# Patient Record
Sex: FEMALE | Race: WHITE | NOT HISPANIC OR LATINO | Employment: UNEMPLOYED | URBAN - METROPOLITAN AREA
[De-identification: names, ages, dates, MRNs, and addresses within clinical notes are randomized per-mention and may not be internally consistent; named-entity substitution may affect disease eponyms.]

---

## 2017-06-07 ENCOUNTER — CONVERSION ENCOUNTER (OUTPATIENT)
Dept: GENERAL RADIOLOGY | Facility: HOSPITAL | Age: 52
End: 2017-06-07

## 2018-01-23 ENCOUNTER — OFFICE VISIT CONVERTED (OUTPATIENT)
Dept: SURGERY | Facility: CLINIC | Age: 53
End: 2018-01-23
Attending: SURGERY

## 2018-02-06 ENCOUNTER — OFFICE VISIT CONVERTED (OUTPATIENT)
Dept: SURGERY | Facility: CLINIC | Age: 53
End: 2018-02-06
Attending: SURGERY

## 2018-04-16 ENCOUNTER — OFFICE VISIT CONVERTED (OUTPATIENT)
Dept: PULMONOLOGY | Facility: CLINIC | Age: 53
End: 2018-04-16
Attending: PHYSICIAN ASSISTANT

## 2019-05-14 ENCOUNTER — OFFICE VISIT CONVERTED (OUTPATIENT)
Dept: NEUROSURGERY | Facility: CLINIC | Age: 54
End: 2019-05-14
Attending: PHYSICIAN ASSISTANT

## 2019-05-14 ENCOUNTER — CONVERSION ENCOUNTER (OUTPATIENT)
Dept: NEUROLOGY | Facility: CLINIC | Age: 54
End: 2019-05-14

## 2019-05-14 ENCOUNTER — HOSPITAL ENCOUNTER (OUTPATIENT)
Dept: GENERAL RADIOLOGY | Facility: HOSPITAL | Age: 54
Discharge: HOME | End: 2019-05-14
Attending: PHYSICIAN ASSISTANT

## 2019-06-26 ENCOUNTER — OFFICE VISIT CONVERTED (OUTPATIENT)
Dept: NEUROSURGERY | Facility: CLINIC | Age: 54
End: 2019-06-26
Attending: PHYSICIAN ASSISTANT

## 2019-06-26 ENCOUNTER — CONVERSION ENCOUNTER (OUTPATIENT)
Dept: NEUROLOGY | Facility: CLINIC | Age: 54
End: 2019-06-26

## 2019-07-16 ENCOUNTER — OFFICE VISIT CONVERTED (OUTPATIENT)
Dept: UROLOGY | Facility: CLINIC | Age: 54
End: 2019-07-16
Attending: UROLOGY

## 2020-07-06 ENCOUNTER — HOSPITAL ENCOUNTER (OUTPATIENT)
Dept: GENERAL RADIOLOGY | Facility: HOSPITAL | Age: 55
Discharge: HOME | End: 2020-07-06
Attending: NURSE PRACTITIONER

## 2020-07-14 ENCOUNTER — HOSPITAL ENCOUNTER (OUTPATIENT)
Dept: GENERAL RADIOLOGY | Facility: HOSPITAL | Age: 55
Discharge: HOME | End: 2020-07-14
Attending: NURSE PRACTITIONER

## 2020-07-14 LAB
CREAT BLD-MCNC: 0.7 MG/DL (ref 0.6–1.4)
GFR SERPLBLD BASED ON 1.73 SQ M-ARVRAT: >60 ML/MIN/{1.73_M2}

## 2020-07-22 ENCOUNTER — OFFICE VISIT CONVERTED (OUTPATIENT)
Dept: SURGERY | Facility: CLINIC | Age: 55
End: 2020-07-22
Attending: SURGERY

## 2020-07-27 ENCOUNTER — HOSPITAL ENCOUNTER (OUTPATIENT)
Dept: LAB | Facility: HOSPITAL | Age: 55
Discharge: HOME | End: 2020-07-27
Attending: SURGERY

## 2020-07-28 LAB
CANCER AG19-9 SERPL-ACNC: 10.6 U/ML (ref 0–35)
CEA SERPL-MCNC: 3.5 NG/ML (ref 0–5)

## 2020-07-29 ENCOUNTER — HOSPITAL ENCOUNTER (OUTPATIENT)
Dept: PREADMISSION TESTING | Facility: HOSPITAL | Age: 55
Discharge: HOME | End: 2020-07-29
Attending: SURGERY

## 2020-07-30 LAB — SARS-COV-2 RNA SPEC QL NAA+PROBE: NOT DETECTED

## 2020-08-03 ENCOUNTER — HOSPITAL ENCOUNTER (OUTPATIENT)
Dept: PERIOP | Facility: HOSPITAL | Age: 55
Setting detail: HOSPITAL OUTPATIENT SURGERY
Discharge: HOME | End: 2020-08-03
Attending: SURGERY

## 2020-08-04 ENCOUNTER — HOSPITAL ENCOUNTER (OUTPATIENT)
Dept: PREADMISSION TESTING | Facility: HOSPITAL | Age: 55
Discharge: HOME | End: 2020-08-04
Attending: SURGERY

## 2020-08-04 LAB — SARS-COV-2 RNA SPEC QL NAA+PROBE: NOT DETECTED

## 2020-08-06 ENCOUNTER — HOSPITAL ENCOUNTER (OUTPATIENT)
Dept: PERIOP | Facility: HOSPITAL | Age: 55
Setting detail: HOSPITAL OUTPATIENT SURGERY
Discharge: HOME | End: 2020-08-06
Attending: SURGERY

## 2020-08-07 ENCOUNTER — HOSPITAL ENCOUNTER (OUTPATIENT)
Dept: SURGERY | Facility: CLINIC | Age: 55
Discharge: HOME | End: 2020-08-07
Attending: NURSE PRACTITIONER

## 2020-08-07 ENCOUNTER — OFFICE VISIT CONVERTED (OUTPATIENT)
Dept: UROLOGY | Facility: CLINIC | Age: 55
End: 2020-08-07
Attending: NURSE PRACTITIONER

## 2020-08-09 LAB — BACTERIA UR CULT: NORMAL

## 2020-08-20 ENCOUNTER — OFFICE VISIT CONVERTED (OUTPATIENT)
Dept: SURGERY | Facility: CLINIC | Age: 55
End: 2020-08-20
Attending: SURGERY

## 2020-08-24 ENCOUNTER — HOSPITAL ENCOUNTER (OUTPATIENT)
Dept: PREADMISSION TESTING | Facility: HOSPITAL | Age: 55
Discharge: HOME | End: 2020-08-24
Attending: SURGERY

## 2020-08-25 LAB — SARS-COV-2 RNA SPEC QL NAA+PROBE: NOT DETECTED

## 2020-09-17 ENCOUNTER — HOSPITAL ENCOUNTER (OUTPATIENT)
Dept: PREADMISSION TESTING | Facility: HOSPITAL | Age: 55
Discharge: HOME | End: 2020-09-17
Attending: SURGERY

## 2020-09-18 LAB — SARS-COV-2 RNA SPEC QL NAA+PROBE: NOT DETECTED

## 2020-09-22 ENCOUNTER — HOSPITAL ENCOUNTER (OUTPATIENT)
Dept: GASTROENTEROLOGY | Facility: HOSPITAL | Age: 55
Setting detail: HOSPITAL OUTPATIENT SURGERY
Discharge: HOME | End: 2020-09-22
Attending: SURGERY

## 2020-09-30 ENCOUNTER — OFFICE VISIT CONVERTED (OUTPATIENT)
Dept: SURGERY | Facility: CLINIC | Age: 55
End: 2020-09-30
Attending: SURGERY

## 2020-11-19 ENCOUNTER — OFFICE VISIT CONVERTED (OUTPATIENT)
Dept: UROLOGY | Facility: CLINIC | Age: 55
End: 2020-11-19
Attending: NURSE PRACTITIONER

## 2020-11-30 ENCOUNTER — HOSPITAL ENCOUNTER (OUTPATIENT)
Dept: GENERAL RADIOLOGY | Facility: HOSPITAL | Age: 55
Discharge: HOME | End: 2020-11-30
Attending: NURSE PRACTITIONER

## 2020-12-03 ENCOUNTER — TELEPHONE CONVERTED (OUTPATIENT)
Dept: UROLOGY | Facility: CLINIC | Age: 55
End: 2020-12-03
Attending: NURSE PRACTITIONER

## 2020-12-07 ENCOUNTER — HOSPITAL ENCOUNTER (OUTPATIENT)
Dept: GENERAL RADIOLOGY | Facility: HOSPITAL | Age: 55
Discharge: HOME | End: 2020-12-07
Attending: NURSE PRACTITIONER

## 2020-12-08 ENCOUNTER — HOSPITAL ENCOUNTER (OUTPATIENT)
Dept: CARDIOLOGY | Facility: HOSPITAL | Age: 55
Discharge: HOME | End: 2020-12-08
Attending: NURSE PRACTITIONER

## 2021-01-07 ENCOUNTER — OFFICE VISIT CONVERTED (OUTPATIENT)
Dept: NEUROLOGY | Facility: CLINIC | Age: 56
End: 2021-01-07
Attending: PSYCHIATRY & NEUROLOGY

## 2021-02-26 ENCOUNTER — HOSPITAL ENCOUNTER (OUTPATIENT)
Dept: GENERAL RADIOLOGY | Facility: HOSPITAL | Age: 56
Discharge: HOME | End: 2021-02-26
Attending: PSYCHIATRY & NEUROLOGY

## 2021-04-15 ENCOUNTER — CONVERSION ENCOUNTER (OUTPATIENT)
Dept: FAMILY MEDICINE CLINIC | Facility: CLINIC | Age: 56
End: 2021-04-15

## 2021-04-15 ENCOUNTER — OFFICE VISIT CONVERTED (OUTPATIENT)
Dept: FAMILY MEDICINE CLINIC | Facility: CLINIC | Age: 56
End: 2021-04-15
Attending: PHYSICIAN ASSISTANT

## 2021-05-10 NOTE — H&P
History and Physical      Patient Name: Lindsey Mckinnon   Patient ID: [id number]   Sex: Female   YOB: 1965    Primary Care Provider: Dipti Tran NP   Referring Provider: Dipti Tran NP    Visit Date: November 19, 2020    Provider: SOFIA Dotson   Location: [facility]   Location Address: [address] [place] [address]  [id number]   Location Phone: [phone]          Chief Complaint  · pt here for urological concerns      History Of Present Illness  The patient is a 55 year old /White female, who is a consultation from Dipti Tran NP, for the evaluation of microhematuria. The patient was found to have microhematuria on an urinalysis approximately 2 years ago.     She states the color of her urine has been grossly clear. The patient also reports back pain and issues emptying bladder. She denies frequency, urgency, dysuria, fever, chills, nausea, vomiting, and weight loss.     She states that there is no history of recent abdominal or flank trauma. The patient's past medical history is noncontributory.     The patient has not been previously evaluated for hematuria.      Patient reports that she has trouble emptying her bladder and this has been ongoing. She had some post op urinary retention on tamsulosin and states complete relief of issues with voiding.  However she was only supposed to be on that for a short period of time and has not had any of that since.    She has a history of a renal stone.               Past Medical History  Disease Name Date Onset Notes   Allergic rhinitis, chronic --  --    Anxiety --  --    Arteriosclerotic cardiovascular disease (ASCVD) --  --    Arthritis --  --    Arthritis --  --    Breast lump --  --    Cervical spine pain --  --    Chest pain --  --    Chronic Obstructive Pulmonary Disease --  --    COPD --  --    COPD (chronic obstructive pulmonary disease) --  --    Degenerative Disc Disease  --  --    Depression --  --     Esophageal reflux --  --    Fibromyalgia --  --    GERD --  --    Heart attack --  --    Heart Attack --  --    Herniated Disc --  --    High blood pressure --  --    High cholesterol --  --    Hyperlipemia --  --    Hyperlipidemia --  --    Hypertension --  --    Hypertension, Benign Essential --  --    Hypertension, essential --  --    Kidney stones --  --    Limb Swelling --  --    Mood disorder --  --    Muscle cramps --  --    Pain, Lumbar --  --    Reflux --  --    Renal stones --  --    Shortness Of Air --  --    Shortness of Breath --  --          Past Surgical History  Procedure Name Date Notes   Cervical epidural steroid injections --  --    Cesarian Section --  --    EGD --  --    Hysterectomy --  --    Hysterectomy-Abdominal --  --    Lumbar epidural steroid injections --  --    Oopherectomy --  --    Tubal ligation --  --          Medication List  Name Date Started Instructions   Breo Ellipta 100-25 mcg/dose inhalation blister with device  inhale 1 puff by inhalation route once daily at the same time each day   Claritin 10 mg oral tablet  take 1 tablet (10 mg) by oral route once daily   lisinopril 20 mg oral tablet  take 2 tablets (40 mg) by oral route once daily   methocarbamol 500 mg oral tablet  take 2 tablets (1,000 mg) by oral route 4 times per day   OxyContin 15 mg oral tablet,oral only,ext.rel.12 hr  take 1 tablet (15 mg) by oral route every 12 hours   sertraline 100 mg oral tablet  take 1 tablet (100 mg) by oral route once daily   Spiriva Respimat 1.25 mcg/actuation inhalation mist  inhale 2 puffs (2.5 mcg) by inhalation route once daily         Allergy List  Allergen Name Date Reaction Notes   Morphine Sulfate --  --  --    SULFA (SULFONAMIDES) --  --  --        Allergies Reconciled  Family Medical History  Disease Name Relative/Age Notes   Lung Neoplasm, Malignant Father/   --    Cancer, Unspecified Father/  Mother/   Mother; Father   Diabetes, unspecified type Brother/  Father/  Mother/    Mother; Father; Brother   Spine Problems Mother/   --    Heart Attack (MI) Father/   --    Renal Calculus Brother/  Father/   Father; Brother  Mother; Brother   Bladder calculus Father/  Mother/   Mother; Father  Mother   Osteoporosis Mother/   Mother   Family history of Arthritis Brother/  Father/  Mother/  Son/   Mother; Father; Brother; Son   Family history of cancer Father/  Mother/   Mother; Father   Family history of heart disease Father/   Father   Family history of diabetes mellitus Brother/  Father/  Mother/   Mother; Father; Brother         Social History  Finding Status Start/Stop Quantity Notes   Alcohol Use --  --/-- --  08/20/2020 - 06/26/2019 - 05/14/2019 - rarely drinks, less than 1 drink per day, has been drinking for 11-20 years   Caffeine Current every day --/-- --  drinks regularly; coffee; 1-2 times per day    --  --/-- --  --    lives with other --  --/-- --  --    Recreational Drug Use Never --/-- --  no   Second hand smoke exposure Current some day --/-- --  yes   Tobacco Current every day --/-- 0.5 PPD current every day smoker, 0.5 pack per day, smoked 21-30 years  current everyday smoker; started smoking at age 20; smoked 2 cigarette(s) per day  current every day smoker, 0.5 pack per day, smoked 21-30 years   Unemployed --  --/-- --  --          Review of Systems  · Constitutional  o Denies  o : fever, chills  · Eyes  o Denies  o : double vision, cataracts  · HENT  o Denies  o : hearing loss, headaches  · Cardiovascular  o Denies  o : chest pain at rest, chest pain with exercise, irregular heart beats, palpitations, leg cramps with exercise  · Respiratory  o Denies  o : shortness of breath, wheezing, sleep apnea  · Gastrointestinal  o Denies  o : heartburn or indigestion, nausea or vomiting, change in abdominal girth, diarrhea, constipation, blood in stools  · Genitourinary  o Admits  o : additional symptoms as noted in HPI  · Integument  o Denies  o : rash, new skin  "lesions  · Neurologic  o Denies  o : memory difficulties, headache, mini-strokes, seizures  · Endocrine  o Denies  o : hot flashes, thyroid disorders  · Psychiatric  o Denies  o : depression, schizophrenia, bipolar disorder  · Heme-Lymph  o Denies  o : easy bleeding, easy bruising, sickle cell disease or trait, lymph node enlargement or tenderness  · Allergic-Immunologic  o Denies  o : immune deficiency, HIV, Hepatitis C      Vitals  Date Time BP Position Site L\R Cuff Size HR RR TEMP (F) WT  HT  BMI kg/m2 BSA m2 O2 Sat FR L/min FiO2 HC       11/19/2020 03:24 PM       15  235lbs 0oz 5'  5\" 39.11 2.21             Physical Examination  · Constitutional  o Appearance  o : Well nourished, well developed patient in no acute distress. Ambulating without difficulty.  · Head and Face  o Head  o :   § Inspection  § : atraumatic, normocephalic  o Face  o :   § Inspection  § : no facial lesions  · Eyes  o Sclerae  o : sclerae white  · Ears, Nose, Mouth and Throat  o Ears  o :   § External Ears  § : appearance within normal limits, no lesions present  o Nose  o :   § External Nose  § : appearance normal  · Neck  o Inspection/Palpation  o : normal appearance, trachea midline  · Respiratory  o Respiratory Effort  o : Breathing is unlabored without accessory muscle use  o Inspection of Chest  o : normal appearance, no retractions  · Skin and Subcutaneous Tissue  o General Inspection  o : No rashes, lesions or areas of discoloration present. Skin turgor is normal.  o General Palpation  o : No abnormalities, masses or tenderness on palpation.  · Neurologic  o Mental Status Examination  o :   § Orientation  § : grossly oriented to person, place and time  § Speech/Language  § : communication ability within normal limits  o Gait and Station  o : normal gait, able to stand without difficulty  · Psychiatric  o Judgement and Insight  o : judgment and insight intact, judgement for everyday activities and social situations within normal " limits, insight intact  o Mood and Affect  o : mood normal, affect appropriate          Results  · In-Office Procedures  o Lab procedure  § Automated dipstick urinalysis with microscopy ([id number])   § Color Ur: Yellow   § Clarity Ur: Clear   § Glucose Ur Ql Strip: Negative   § Bilirub Ur Ql Strip: Negative   § Ketones Ur Ql Strip: Negative   § Sp Gr Ur Qn: 1.025   § Hgb Ur Ql Strip: Small   § pH Ur-LsCnc: 5.0   § Prot Ur Ql Strip: Negative   § Urobilinogen Ur Strip-mCnc: 0.2   § Nitrite Ur Ql Strip: Negative   § WBC Est Ur Ql Strip: Trace   § RBC UrnS Qn HPF: 3-5   § WBC UrnS Qn HPF: 2-3   § Bacteria UrnS Qn HPF: 0   § Crystals UrnS Qn HPF: 0   § Epithelial Cells (non renal): 0 /HPF  § Epithelial Cells (renal): 0       Assessment  · Microscopic hematuria     599.72/R31.29  · Renal cyst     753.10/N28.1  · Urinary retention with incomplete bladder emptying     788.21/R33.9      Plan  · Orders  o CT Abdomen and Pelvis (with and without Contrast) with Bosniak Class and delayed images ([id number]) - 599.72/R31.29 - 11/19/2020  o Cystourethroscopy (18435) - 599.72/R31.29, 753.10/N28.1 - 11/19/2020  · Medications  o tamsulosin 0.4 mg oral capsule   SIG: take 1 capsule (0.4 mg) by oral route once daily 1/2 hour following the same meal each day for 30 days   DISP: (30) Capsule with 11 refills  Prescribed on 11/19/2020     o Medications have been Reconciled  o Transition of Care or Provider Policy  · Instructions  o DISCUSSION:  o The patient has documented hematuria. I have discussed the etiologies of hematuria and the options for management and treatment. I have outlined my recommendation for this problem. The patient is in agreement with the plans. The patient is aware that if the workup is not completed, there is a chance that a malignancy may go undetected and may lead to morbidity or mortality.  o PLAN: Will send in tamsulosin for patient to see if this alleviates her issues with urination. Follow-up with patient 2 days  after CT scan  o Schedule cystoscopy  o CT abdomen/pelvis without and with IV contrast  o Electronically Identified Patient Education Materials Provided Electronically  · Referrals  o ID: [id number] Date: 11/17/2020 Type: Inbound  Specialty: Urology            Electronically Signed by: SOFIA Dotson -Author on November 19, 2020 04:08:28 PM

## 2021-05-10 NOTE — H&P
History and Physical      Patient Name: Lindsey Mckinnon   Patient ID: [id number]   Sex: Female   YOB: 1965    Primary Care Provider: Dipti Tran NP   Referring Provider: JB BLACK    Visit Date: January 7, 2021    Provider: Adam Hernández MD   Location: [facility]   Location Address: [address] [place] [address]  [id number]   Location Phone: [phone]          Chief Complaint     New patient here to establish care for tremors       History Of Present Illness  Lindsey Mckinnon is a 55 year old /White female who presents today to [facility] today referred from JB BLACK.      55-year-old woman evaluated for tremors of both arms.  She states that she is not worried about the tremors.  Is been going on for the last 3 to 4 years.  It is worse in the left side.  She states that she is independent with all activities of daily living and the tremor does not bother her.  When she tells me is that her legs are the ones that bother her.  She states that she has had pain in her legs for the last several months and she has spasms as well.  She is being seen by pain management.  She states that she has fallen down twice in the last year.  She states that one time she was washing dishes and her legs gave way on her.  Another time she was walking and she caught herself falling forward because her legs gave out on her but she did not fall all the way.  She is never had any focal neurologic symptoms in the past.  She is blaming her symptoms on her spine.  She has had an MRI of the lumbar spine that was performed 6/4/2019 showing mild left and moderate to severe right foraminal narrowing at L5-S1.  The other levels are unremarkable for any significant neuroforaminal narrowing.  She has had an MRI of the cervical spine as well showing no significant neuroforaminal narrowing or spinal stenosis.  She also had a lumbar spine MRI last month  showing the same findings at L5-S1 showing a degree of neuroforaminal is not significantly changed compared to the one performed in June 2019.  I do not see any significant spinal stenosis on reviewing the images.    She has no symptoms of restless leg syndrome.  She states that the spasms in her legs occur during the daytime and not necessarily at night and she does not have the urge to move her legs at night.       Past Medical History  Allergic rhinitis, chronic; Anxiety; Arteriosclerotic cardiovascular disease (ASCVD); Arthritis; Arthritis; Breast lump; Cervical spine pain; Chest pain; Chronic Obstructive Pulmonary Disease; COPD; COPD (chronic obstructive pulmonary disease); Degenerative Disc Disease ; Depression; Esophageal reflux; Fibromyalgia; GERD; Heart attack; Heart Attack; Herniated Disc; High blood pressure; High cholesterol; Hyperlipemia; Hyperlipidemia; Hypertension; Hypertension, Benign Essential; Hypertension, essential; Kidney stones; Limb Swelling; Mood disorder; Muscle cramps; Pain, Lumbar; Reflux; Renal stones; Shortness Of Air; Shortness of Breath         Past Surgical History  Cervical epidural steroid injections; Cesarian Section; EGD; Hysterectomy; Hysterectomy-Abdominal; Lumbar epidural steroid injections; Oopherectomy; Tubal ligation         Medication List  Breo Ellipta 100-25 mcg/dose inhalation blister with device; Claritin 10 mg oral tablet; hydrochlorothiazide 25 mg oral tablet; lisinopril 20 mg oral tablet; loratadine 10 mg oral tablet; metaxalone 800 mg oral tablet; OxyContin 15 mg oral tablet,oral only,ext.rel.12 hr; sertraline 100 mg oral tablet; Spiriva Respimat 1.25 mcg/actuation inhalation mist; tamsulosin 0.4 mg oral capsule         Allergy List  Morphine Sulfate; SULFA (SULFONAMIDES)       Allergies Reconciled  Family Medical History  Lung Neoplasm, Malignant; Cancer, Unspecified; Diabetes, unspecified type; Spine Problems; Heart Attack (MI); Renal Calculus; Bladder calculus;  "Osteoporosis; Family history of Arthritis; Family history of cancer; Family history of heart disease; Family history of diabetes mellitus         Social History  Alcohol Use; Caffeine (Current every day); ; lives with other; Recreational Drug Use (Never); Second hand smoke exposure (Current some day); Tobacco (Current every day); Unemployed         Review of Systems  · Constitutional  o Admits  o : excessive sweating, fatigue  o Denies  o : chills, fever, sycope/passing out, weight gain, weight loss  · Eyes  o Denies  o : changes in vision, blurred vision, double vision  · HENT  o Admits  o : nasal congestion, seasonal allergies  o Denies  o : hearing loss, ringing in the ears, ear aches, sore throat, sinus pain, nose bleeds  · Cardiovascular  o Admits  o : easy burising or bleeding  o Denies  o : blood clots, swollen legs, anemia, transfusions  · Respiratory  o Admits  o : COPD  o Denies  o : shortness of breath, dry cough, productive cough, pneumonia  · Gastrointestinal  o Denies  o : dysphagia, reflux  · Genitourinary  o Denies  o : incontinence  · Neurologic  o Admits  o : tremor, loss of balance, falls, dizziness/vertigo, difficulty with sleep, numbness/tingling/paresthesia , difficulty with coordination, weakness  o Denies  o : headache, seizure, stroke, difficulty with dexterity  · Musculoskeletal  o Admits  o : neck stiffness/pain, muscle aches, joint pain, weakness, spasms, sciatica  o Denies  o : swollen lymph nodes, pain radiating in arm, pain radiating in leg, low back pain  · Endocrine  o Denies  o : diabetes, thyroid disorder  · Psychiatric  o Admits  o : anxiety, depression      Vitals  Date Time BP Position Site L\R Cuff Size HR RR TEMP (F) WT  HT  BMI kg/m2 BSA m2 O2 Sat FR L/min FiO2 HC       01/07/2021 03:07 PM        97.5           01/07/2021 03:46 /72 Sitting    101 - R  96.8 227lbs 8oz 5'  5\" 37.86 2.18             Physical Examination     She is alert, fluent, phasic, follows " commands well.  There is no weakness of the upper or lower extremities proximally or distally on individual muscle testing.  Reflexes are normoactive symmetrical in biceps, triceps and patellar's and decrease in ankles.  On Station and gait she is able to tiptoe, heel walk, es without any difficulty.  Armswing is normal and turning is intact.  She has slight difficulty with tandem holding onto the walls.  During this Station and gait she has tremor noted in her left hand which is a fast tremor but goes away when distracted.  Continues to have the left-sided tremor when she is sitting down but goes away when distracted.  There is also tremor noted both hands and Archimedes spiral shows the tremor on her left hand and is incorporated in the chart.  There is a tremor noted in the right hand as well with hands extended and finger-to-nose testing.           Assessment  · Essential tremor       Essential tremor     333.1/G25.0  I discussed with her that she has essential tremor and I can treat this with medications if it bothers her activities of daily living. She states that she can live with the tremor at this time.    I will order an MRI of the brain and she is to find out the results. Her tremor is predominantly left handed and it is at rest as well as with movement. This could be a rubral tremor.  · Leg pain, bilateral       Pain in right leg     729.5/M79.604  Pain in left leg     729.5/M79.605  I discussed with her that her leg pain cannot be explained on lumbosacral radiculopathy especially only involving the L5-S1 nerve root on the right side. I discussed with her that she does not have any evidence of Parkinson's disease.    Total time spent with patient and coordinating patient care was 50 minutes.      Plan  · Orders  o MRI brain wo contrast ([id number]) - 333.1/G25.0, 729.5/M79.604, 729.5/M79.605 - 01/07/2021  · Medications  o Medications have been Reconciled  o Transition of Care or Provider  Policy  · Instructions  o Encouraged to follow-up with Primary Care Provider for preventative care.            Electronically Signed by: Adam Hernández MD -Author on January 12, 2021 05:14:15 PM

## 2021-05-10 NOTE — H&P
History and Physical      Patient Name: Lindsey Mckinnon   Patient ID: [id number]   Sex: Female   YOB: 1965    Primary Care Provider: Dipti Tran NP   Referring Provider: Dipti Tran NP    Visit Date: August 7, 2020    Provider: SOFIA Dotson   Location: [facility]   Location Address: [address] [place] [address]  [id number]   Location Phone: [phone]          Chief Complaint  · I feel like my bladder is not emptying  · I have difficulty urinating      History Of Present Illness  The patient is a 55 year old /White female presents today in office to be seen for urinary retention due to a recent surgery.   She has had the current problem for 12 hours with no relief of symptoms.      Patient is 1 day status post laparoscopic cholecystectomy.    The patient states that she is able to urinate although it takes an extensive period of time to fully empty her bladder.    She was able to void 200 cc into the specimen hat while in office and her PVR is 48       Past Medical History  Disease Name Date Onset Notes   Allergic rhinitis, chronic --  --    Anxiety --  --    Arteriosclerotic cardiovascular disease (ASCVD) --  --    Arthritis --  --    Arthritis --  --    Breast lump --  --    Cervical spine pain --  --    Chest pain --  --    Chronic Obstructive Pulmonary Disease --  --    COPD --  --    COPD (chronic obstructive pulmonary disease) --  --    Degenerative Disc Disease  --  --    Depression --  --    Esophageal reflux --  --    Fibromyalgia --  --    GERD --  --    Heart attack --  --    Heart Attack --  --    Herniated Disc --  --    High blood pressure --  --    High cholesterol --  --    Hyperlipemia --  --    Hyperlipidemia --  --    Hypertension --  --    Hypertension, Benign Essential --  --    Hypertension, essential --  --    Kidney stones --  --    Limb Swelling --  --    Mood disorder --  --    Muscle cramps --  --    Pain, Lumbar --  --    Reflux --  --     Renal stones --  --    Shortness Of Air --  --    Shortness of Breath --  --          Past Surgical History  Procedure Name Date Notes   Cervical epidural steroid injections --  --    Cesarian Section --  --    EGD --  --    Hysterectomy --  --    Hysterectomy-Abdominal --  --    Lumbar epidural steroid injections --  --    Oopherectomy --  --    Tubal ligation --  --          Medication List  Name Date Started Instructions   Breo Ellipta 100-25 mcg/dose inhalation blister with device  inhale 1 puff by inhalation route once daily at the same time each day   Claritin 10 mg oral tablet  take 1 tablet (10 mg) by oral route once daily   lisinopril 20 mg oral tablet  take 2 tablets (40 mg) by oral route once daily   OxyContin 15 mg oral tablet,oral only,ext.rel.12 hr  take 1 tablet (15 mg) by oral route every 12 hours   sertraline 100 mg oral tablet  take 1 tablet (100 mg) by oral route once daily   Spiriva Respimat 1.25 mcg/actuation inhalation mist  inhale 2 puffs (2.5 mcg) by inhalation route once daily         Allergy List  Allergen Name Date Reaction Notes   Morphine Sulfate --  --  --    SULFA (SULFONAMIDES) --  --  --          Family Medical History  Disease Name Relative/Age Notes   Lung Neoplasm, Malignant Father/   --    Cancer, Unspecified Father/  Mother/   Mother; Father   Diabetes, unspecified type Brother/  Father/  Mother/   Mother; Father; Brother   Spine Problems Mother/   --    Heart Attack (MI) Father/   --    Renal Calculus Brother/  Father/   Father; Brother  Mother; Brother   Bladder calculus Father/  Mother/   Mother; Father  Mother   Osteoporosis Mother/   Mother   Family history of Arthritis Brother/  Father/  Mother/  Son/   Mother; Father; Brother; Son   Family history of cancer Father/  Mother/   Mother; Father   Family history of heart disease Father/   Father   Family history of diabetes mellitus Brother/  Father/  Mother/   Mother; Father; Brother         Social History  Finding Status  "Start/Stop Quantity Notes   Alcohol Use --  --/-- --  06/26/2019 - 05/14/2019 - rarely drinks, less than 1 drink per day, has been drinking for 11-20 years   Caffeine Current every day --/-- --  drinks regularly; coffee; 1-2 times per day    --  --/-- --  --    lives with other --  --/-- --  --    Recreational Drug Use Never --/-- --  no   Second hand smoke exposure Current some day --/-- --  yes   Tobacco Current every day --/-- 0.5 PPD current every day smoker, 0.5 pack per day, smoked 21-30 years  current everyday smoker; started smoking at age 20; smoked 2 cigarette(s) per day  current every day smoker, 0.5 pack per day, smoked 21-30 years   Unemployed --  --/-- --  --          Review of Systems  · Respiratory  o Denies  o : shortness of breath  · Genitourinary  o Admits  o : trouble voiding  o Denies  o : abnormal color of urine, blood in urine, burning with urination, frequency of urine  · Integument  o Denies  o : rash  · Neurologic  o Denies  o : tingling or numbness  · Musculoskeletal  o Denies  o : joint pain  · Endocrine  o Denies  o : weight gain  · Psychiatric  o Denies  o : anxiety, depression  · Heme-Lymph  o Denies  o : lightheadedness, easy bleeding      Vitals  Date Time BP Position Site L\R Cuff Size HR RR TEMP (F) WT  HT  BMI kg/m2 BSA m2 O2 Sat        08/07/2020 02:51 PM       15  240lbs 0oz 5'  5\" 39.94 2.23           Physical Examination  · Constitutional  o Appearance  o : well-nourished, well developed, alert, in no acute distress  · Head and Face  o Head  o :   § Inspection  § : atraumatic, normocephalic  o Face  o :   § Inspection  § : no facial lesions  · Eyes  o Sclerae  o : sclerae white  · Ears, Nose, Mouth and Throat  o Ears  o :   § External Ears  § : appearance within normal limits, no lesions present  o Nose  o :   § External Nose  § : appearance normal  · Neck  o Inspection/Palpation  o : normal appearance, trachea midline  · Respiratory  o Respiratory Effort  o : " breathing unlabored  o Inspection of Chest  o : normal appearance, no retractions  · Skin and Subcutaneous Tissue  o General Inspection  o : no rashes or lesions present, no lesions present, no areas of discoloration  · Neurologic  o Mental Status Examination  o :   § Orientation  § : grossly oriented to person, place and time  § Speech/Language  § : communication ability within normal limits  o Gait and Station  o : normal gait, able to stand without difficulty  · Psychiatric  o Judgement and Insight  o : judgment and insight intact, judgement for everyday activities and social situations within normal limits, insight intact  o Mood and Affect  o : mood normal, affect appropriate          Results  · In-Office Procedures  o Lab procedure  § Automated dipstick urinalysis with microscopy (14006)   § Color Ur: Yellow   § Clarity Ur: Clear   § Glucose Ur Ql Strip: Negative   § Bilirub Ur Ql Strip: Negative   § Ketones Ur Ql Strip: Negative   § Sp Gr Ur Qn: 1.025   § Hgb Ur Ql Strip: Small   § pH Ur-LsCnc: 7.0   § Prot Ur Ql Strip: Negative   § Urobilinogen Ur Strip-mCnc: 0.2   § Nitrite Ur Ql Strip: Negative   § WBC Est Ur Ql Strip: Trace   § RBC UrnS Qn HPF: 3-5   § WBC UrnS Qn HPF: 0   § Bacteria UrnS Qn HPF: tntc   § Crystals UrnS Qn HPF: 0   § Epithelial Cells (non renal): 0 /HPF  § Epithelial Cells (renal): 0   o Surgical procedure  § IOP - Bladder Scan/Residual Urine (16991)   § Specimen vol Ur: 48       Assessment  · Urinary Retention     788.20/R33.9      Plan  · Orders  o Urine Culture (Clean Catch) [facility] (81960) - 788.20/R33.9 - 08/07/2020  · Medications  o tamsulosin 0.4 mg oral capsule   SIG: take 1 capsule (0.4 mg) by oral route once daily 1/2 hour following the same meal each day for 14 days   DISP: (14) capsules with 0 refills  Prescribed on 08/07/2020     · Instructions  o Will prescribe short-term tamsulosin in order to aid in emptying the bladder. Educated patient that can be a normal occurrence after  anesthesia especially when the patient continues to take opioid pain medication. Educated patient that should she not be able to void for a period of 8 hours to go to the emergency department for an indwelling Akhtar catheter to alleviate urinary retention. She will follow-up with Dr. Ledesma in 2 weeks as scheduled.            Electronically Signed by: SOFIA Dotson -Author on August 7, 2020 03:00:21 PM

## 2021-05-11 NOTE — H&P
History and Physical      Patient Name: Lindsey Mckinnon   Patient ID: [id number]   Sex: Female   YOB: 1965    Primary Care Provider: Dipti Tran NP   Referring Provider: JB BLACK    Visit Date: April 15, 2021    Provider: Brayan Goodman PA-C   [place] [facility]   [place] Address: [address] [place] [address], [address]  [place], [place]  [id number]   Location Phone: [phone]          Chief Complaint  · New Patient, Establish Care      History Of Present Illness  Lindsey Mckinnon is a 55 year old /White female who presents for evaluation and treatment of: New Patient, Establish Care      Pt presents today as a new patient to establish care.     Pt's previous PCP was Dipti BLACK.     Pt offers no complaints at this time, states she just wants to establish care.     Pt is requesting rx to help quit smoking, she is also needing several refills.     PMH-COPD: controlled with Spiriva and Breo, Anxiety & Depression: pt is on Sertraline; does not feel like it is well controlled, HTN-controlled with Lisinopril & HCTZ, Chronic back and neck pain- sees [facility] pain management. Heart attack-unsure when, she does not see cardiologist.     Labs-7/2020  Mammo-2017  Colonoscopy-2019    Tobacco use not ready to quit.  1/2pk/day    EGD and Colon  [facility]             Past Medical History  Disease Name Date Onset Notes   Allergic rhinitis, chronic --  --    Anemia --  --    Anxiety --  --    Arteriosclerotic cardiovascular disease (ASCVD) --  --    Arthritis --  --    Arthritis --  --    Breast lump --  --    Cervical spine pain --  --    Chest pain --  --    Chronic Obstructive Pulmonary Disease --  --    COPD --  --    COPD (chronic obstructive pulmonary disease) --  --    Degenerative Disc Disease  --  --    Depression --  --    Emphysema --  --    Esophageal reflux --  --    Fibromyalgia --  --    Forgetfulness --  --    Gall Stones --  --    GERD --   --    Heart attack --  --    Heart Attack --  --    Hemorrhoid --  --    Hernia --  --    Herniated Disc --  --    High blood pressure --  --    High cholesterol --  --    Hyperlipemia --  --    Hyperlipidemia --  --    Hypertension --  --    Hypertension, Benign Essential --  --    Hypertension, essential --  --    Kidney stone --  --    Kidney Stones --  --    Limb Swelling --  --    Migraine --  --    Mood disorder --  --    Muscle cramps --  --    Pain, Lumbar --  --    Reflux --  --    Shortness Of Air --  --    Shortness of Breath --  --          Past Surgical History  Procedure Name Date Notes   Cervical epidural steroid injections --  --    Cesarian Section 1982, 1985 --    EGD --  --    Gallbladder 2020 --    Hysterectomy 2005 --    Lumbar epidural steroid injections --  --          Medication List  Name Date Started Instructions   Breo Ellipta 100-25 mcg/dose inhalation blister with device  inhale 1 puff by inhalation route once daily at the same time each day   hydrochlorothiazide 25 mg oral tablet  take 1 tablet (25 mg) by oral route once daily   lisinopril 30 mg oral tablet  take 1 tablet (30 mg) by oral route once daily   loratadine 10 mg oral tablet  take 1 tablet (10 mg) by oral route once daily   metaxalone 800 mg oral tablet  take 1 tablet (800 mg) by oral route 3 times per day as needed   OxyContin 15 mg oral tablet,oral only,ext.rel.12 hr  take 1 tablet (15 mg) by oral route every 12 hours   pantoprazole 40 mg oral tablet,delayed release (DR/EC)  take 1 tablet (40 mg) by oral route once daily   sertraline 100 mg oral tablet  take 1 tablet (100 mg) by oral route once daily   Spiriva Respimat 1.25 mcg/actuation inhalation mist  inhale 2 puffs (2.5 mcg) by inhalation route once daily         Allergy List  Allergen Name Date Reaction Notes   Morphine Sulfate --  --  --    SULFA (SULFONAMIDES) --  --  --        Allergies Reconciled  Family Medical History  Disease Name Relative/Age Notes   Lung  "Neoplasm, Malignant Father/  Mother/   --    Diabetes, unspecified type Brother/  Father/  Mother/   Mother; Father; Brother   Heart Attack (MI) Father/   --    Renal Calculus Brother/  Father/   Father; Brother  Mother; Brother   Bladder calculus Father/  Mother/   Mother; Father  Mother   Osteoporosis Mother/   Mother   Family history of Arthritis Brother/  Father/  Mother/  Son/   Mother; Father; Brother; Son   Family history of cancer Father/  Mother/   Mother; Father   Family history of heart disease Father/   Father   Family history of diabetes mellitus Brother/  Father/  Mother/   Mother; Father; Brother         Social History  Finding Status Start/Stop Quantity Notes   Alcohol Never --/-- --  --    Alcohol Use --  --/-- --  08/20/2020 - 06/26/2019 - 05/14/2019 - rarely drinks, less than 1 drink per day, has been drinking for 11-20 years    --  --/-- --  --    lives with other --  --/-- --  --    Recreational Drug Use Never --/-- --  no   Tobacco Current every day 15/-- 0.5 PPD current every day smoker, 0.5 pack per day, smoked 21-30 years  current everyday smoker; started smoking at age 20; smoked 2 cigarette(s) per day  current every day smoker, 0.5 pack per day, smoked 21-30 years   Unemployed --  --/-- --  --          Review of Systems  · Constitutional  o Denies  o : fever, fatigue, weight loss, weight gain  · Cardiovascular  o Denies  o : lower extremity edema, claudication, chest pressure, palpitations  · Respiratory  o Denies  o : shortness of breath, wheezing, cough, hemoptysis, dyspnea on exertion  · Gastrointestinal  o Denies  o : nausea, vomiting, diarrhea, constipation, abdominal pain      Vitals  Date Time BP Position Site L\R Cuff Size HR RR TEMP (F) WT  HT  BMI kg/m2 BSA m2 O2 Sat FR L/min FiO2 HC       04/15/2021 02:33 /71 Sitting    103 - R   227lbs 0oz 5'  5\" 37.77 2.17 93 %            Physical Examination  · Constitutional  o Appearance  o : overweight, well developed, " alert, in no acute distress  · Head and Face  o Head  o : normocephalic, atraumatic  · Neck  o Inspection/Palpation  o : normal appearance, no masses or tenderness, trachea midline  o Thyroid  o : gland size normal, nontender, no nodules or masses present on palpation  · Respiratory  o Respiratory Effort  o : breathing unlabored  o Inspection of Chest  o : chest rise symmetric bilaterally  o Auscultation of Lungs  o : wheezing present   · Cardiovascular  o Heart  o :   § Auscultation of Heart  § : regular rate and rhythm, no murmurs, gallops or rubs  o Peripheral Vascular System  o :   § Extremities  § : no edema  · Lymphatic  o Neck  o : no cervical lymphadenopathy, no supraclavicular lymphadenopathy  · Psychiatric  o Mood and Affect  o : mood normal, affect appropriate          Assessment  · COPD (chronic obstructive pulmonary disease)     496/J44.9  · Emphysema of lung     492.8/J43.9  · Essential hypertension     401.9/I10  · Hyperlipidemia     272.4/E78.5  · Nicotine dependence     305.1/F17.200  · Class 2 severe obesity due to excess calories with serious comorbidity and body mass index (BMI) of 37.0 to 37.9 in adult       Morbid (severe) obesity due to excess calories     278.01/E66.01  Body mass index [BMI] 37.0-37.9, adult     278.01/Z68.37  · Screening for depression     V79.0/Z13.89  · Visit for screening mammogram     V76.12/Z12.31  · Heart Attack     412  · Fibromyalgia     729.1/M79.7  · COPD     496  · Arteriosclerotic cardiovascular disease (ASCVD)       Atherosclerotic heart disease of native coronary artery without angina pectoris     429.2/I25.10  · Anxiety     300.00/F41.9  · Depression     296.31  · GERD     530.81  · Tremor     781.0/R25.1      Plan  · Orders  o ACO-17: Screened for tobacco use AND received tobacco cessation intervention (4004F) - 305.1/F17.200 - 04/15/2021  o Low dose CT scan (LDCT) for lung cancer screening [facility] () - 305.1/F17.200 - 04/15/2021   Not sure if she meets  qualifications?  o ACO-18: Positive screen for clinical depression using a standardized tool and a follow-up plan documented () - V79.0/Z13.89 - 04/15/2021  o Screening Mammography; Bilateral 3D (35256, , 06575) - V76.12/Z12.31 - 04/15/2021  o Free T4 (48557) - - 07/15/2021  o Physical, Primary Care Panel (CBC, CMP, Lipid, TSH) [facility] (72107, 89777, 51492, 79621) - - 07/15/2021  o Urinalysis with Reflex Microscopy ([facility]) (25418) - - 07/15/2021  o Vitamin D (25-Hydroxy) Level (49451) - - 07/15/2021  o ACO-39: Current medications updated and reviewed (, 1159F) - - 04/15/2021  · Medications  o Medications have been Reconciled  o Transition of Care or Provider Policy  · Instructions  o Patient advised to monitor blood pressure (B/P) at home and journal readings. Patient informed that a B/P reading at home of more than 130/80 is considered hypertension. For readings greater reyl776/90 or higher patient is advised to follow up in the office with readings for management. Patient advised to limit sodium intake.  o Patient was educated and given low cholesterol diet information.  o Recommended exercise program to assist with cholesterol, weight loss and overall health improvement.  o Advised that cheeses and other sources of dairy fats, animal fats, fast food, and the extras (candy, pastries, pies, doughnuts and cookies) all contain LDL raising nutrients. Advised to increase fruits, vegetables, whole grains, and to monitor portion sizes.   o *Form of nicotine being used: cigs  o Patient was strongly encouraged to discontinue use of any nicotine containing product or minimize the use of the product.  o Depression Screen completed and scanned into the EMR under the designated folder within the patient's documents.  o Today's PHQ-9 result is _18__  o Take all medications as prescribed/directed.  o Patient instructed/educated on their diet and exercise program.  o Patient was educated/instructed on their diagnosis,  treatment and medications prior to discharge from the clinic today.  o Patient counseled to stop smoking.  o Patient counseled to reduce calorie intake.  o Patient was instructed to exercise regularly.  o Discussed Covid-19 precautions including, but not limited to, social distancing, avoid touching your face, and hand washing.   o Too high of dose of ACE inhibitor.  · Disposition  o Call or Return if symptoms worsen or persist.  o F/U in 3 months.  o Care Transition            Electronically Signed by: Brayan Goodman PA-C -Author on April 15, 2021 07:06:23 PM

## 2021-05-13 NOTE — PROGRESS NOTES
Quick Note      Patient Name: Lindsey Mckinnon   Patient ID: [id number]   Sex: Female   YOB: 1965    Primary Care Provider: Dipti Tran NP   Referring Provider: Dipti Tran NP    Visit Date: December 3, 2020    Provider: OSFIA Dotson   Location: [facility]   Location Address: [address] [place] [address]  [id number]   Location Phone: [phone]          History Of Present Illness  TELEHEALTH TELEPHONE VISIT  Lindsey Mckinnon is a 55 year old /White female who is presenting for evaluation via telehealth telephone visit. Verbal consent obtained before beginning visit.   Provider spent 5 minutes with the patient during the telehealth visit.   The following staff were present during this visit: Cesilia BLACK, Gabi Reza RN   Past Medical History/ Overview of Patient Symptoms     Called patient to discuss results of CT scan.  CT scan performed regarding patient's hematuria and feeling of incomplete bladder emptying.    CT scan of the abdomen and pelvis with and without IV contrast with delayed imaging performed on 11/30/2020 revealed no pathology to explain patient's microscopic hematuria or feeling of incomplete bladder emptying.           Assessment  · Microscopic hematuria     599.72/R31.29      Plan  · Orders  o Physican Telephone evaluation, 5-10 min (91549) - 599.72/R31.29 - 12/03/2020  · Medications  o Medications have been Reconciled  o Transition of Care or Provider Policy  · Instructions  o Plan Of Care: Discussed with patient the importance of keeping her appointment for her in office cystoscopy to ensure complete work-up for her microscopic hematuria as she has not had a full evaluation of her lower urinary tracts and this will do so            Electronically Signed by: SOFIA Dotson -Author on December 3, 2020 10:04:50 AM

## 2021-05-13 NOTE — PROGRESS NOTES
Progress Note      Patient Name: Lindsey Mckinnon   Patient ID: [id number]   Sex: Female   YOB: 1965    Primary Care Provider: Dipti Tran NP   Referring Provider: Dipti Tran NP    Visit Date: September 30, 2020    Provider: Jose Ledesma MD   Location: [facility]   Location Address: [address] [place] [address]  [id number]   Location Phone: [phone]          Chief Complaint  · Follow Up Surgery      History Of Present Illness  Lindsey Mckinnon is a 55 year old /White female who presents today for a postoperative visit. She follows-up status post EGD. She has done well after EGD. She reports some similar symptoms but they do seemed improved. No other new symptoms.       Past Medical History  Allergic rhinitis, chronic; Anxiety; Arteriosclerotic cardiovascular disease (ASCVD); Arthritis; Arthritis; Breast lump; Cervical spine pain; Chest pain; Chronic Obstructive Pulmonary Disease; COPD; COPD (chronic obstructive pulmonary disease); Degenerative Disc Disease ; Depression; Esophageal reflux; Fibromyalgia; GERD; Heart attack; Heart Attack; Herniated Disc; High blood pressure; High cholesterol; Hyperlipemia; Hyperlipidemia; Hypertension; Hypertension, Benign Essential; Hypertension, essential; Kidney stones; Limb Swelling; Mood disorder; Muscle cramps; Pain, Lumbar; Reflux; Renal stones; Shortness Of Air; Shortness of Breath         Past Surgical History  Cervical epidural steroid injections; Cesarian Section; EGD; Hysterectomy; Hysterectomy-Abdominal; Lumbar epidural steroid injections; Oopherectomy; Tubal ligation         Medication List  Breo Ellipta 100-25 mcg/dose inhalation blister with device; Claritin 10 mg oral tablet; lisinopril 20 mg oral tablet; OxyContin 15 mg oral tablet,oral only,ext.rel.12 hr; sertraline 100 mg oral tablet; Spiriva Respimat 1.25 mcg/actuation inhalation mist; tamsulosin 0.4 mg oral capsule         Allergy List  Morphine Sulfate;  "SULFA (SULFONAMIDES)         Family Medical History  Lung Neoplasm, Malignant; Cancer, Unspecified; Diabetes, unspecified type; Spine Problems; Heart Attack (MI); Renal Calculus; Bladder calculus; Osteoporosis; Family history of Arthritis; Family history of cancer; Family history of heart disease; Family history of diabetes mellitus         Social History  Alcohol Use; Caffeine (Current every day); ; lives with other; Recreational Drug Use (Never); Second hand smoke exposure (Current some day); Tobacco (Current every day); Unemployed         Review of Systems  · Cardiovascular  o Denies  o : chest pain on exertion, shortness of breath, lower extremity swelling  · Respiratory  o Denies  o : shortness of breath, coughing up blood  · Gastrointestinal  o Denies  o : chronic abdominal pain      Vitals  Date Time BP Position Site L\R Cuff Size HR RR TEMP (F) WT  HT  BMI kg/m2 BSA m2 O2 Sat FR L/min FiO2 HC       09/30/2020 01:38 PM       16  231lbs 16oz 5'  5\" 38.61 2.2             Physical Examination  · Constitutional  o Appearance  o : well developed, well-nourished, alert and in no acute distress  · Head and Face  o Head  o :   § Inspection  § : no deformities or lesions  · Eyes  o Conjunctivae  o : clear  o Sclerae  o : nonicteric  · Neck  o Inspection/Palpation  o : normal appearance, no masses or tenderness, trachea midline  · Respiratory  o Respiratory Effort  o : breathing unlabored  o Inspection of Chest  o : normal appearance, no retractions  · Cardiovascular  o Heart  o : regular rate and rhythm  · Gastrointestinal  o Abdominal Examination  o : abdomen is soft  · Lymphatic  o Neck  o : no lymphadenopathy present  o Axilla  o : no lymphadenopathy present  o Groin  o : no lymphadenopathy present  · Skin and Subcutaneous Tissue  o General Inspection  o : no rashes present, no lesions present, no areas of discoloration  · Neurologic  o Cranial Nerves  o : grossly intact  o Sensation  o : grossly " intact  o Gait and Station  o :   § Gait Screening  § : normal gait, able to stand without diffculty  o Cerebellar Function  o : no obvious abnormalities  · Psychiatric  o Judgment and Insight  o : judgment and insight intact  o Mood and Affect  o : mood normal, affect appropriate              Assessment  · Encounter for examination following surgery     V67.00/Z09       Patient status post EGD with the findings of chronic gastritis. Otherwise, her biopsies appeared normal. She had a small hiatal hernia as well.       Plan  · Medications  o Medications have been Reconciled  o Transition of Care or Provider Policy  · Instructions  o Electronically Identified Patient Education Materials Provided Electronically     I didn't see any obvious pathological reason for her symptoms. She does seem to be improving. She wants to continue her Omeprazole. At this point in time, with the findings, I don't think she requires another routine EGD but if her symptoms get worse or persistent, we can repeat it. We can also adjust her medications if needed. She is currently on Famotidine. I have recommended attempting to try Omeprazole to see if that doesn't help her symptoms further. Otherwise, she can follow-up with me as needed. I discussed all of this with the patient. All questions were answered.  She voiced understanding and agreed to proceed with the above plan.             Electronically Signed by: Dunia Wild-, -Author on October 1, 2020 09:57:21 AM  Electronically Co-signed by: Jose Ledesma MD -Reviewer on October 1, 2020 11:23:38 AM

## 2021-05-13 NOTE — PROGRESS NOTES
Progress Note      Patient Name: Lindsey Mckinnon   Patient ID: [id number]   Sex: Female   YOB: 1965    Primary Care Provider: Dipti Tran NP   Referring Provider: Dipti Tran NP    Visit Date: August 20, 2020    Provider: Jose Ledesma MD   Location: [facility]   Location Address: [address] [place] [address]  [id number]   Location Phone: [phone]          Chief Complaint  · Outpatient History & Physical / Surgical Orders  · Follow Up      History Of Present Illness  Lindsey Mckinnon is a 55 year old /White female who follows-up status post laparoscopic cholecystectomy. The patient has done well after her procedure. She is not reporting any unexpected signs or symptoms. She felt great for the first few days after her cholecystectomy but gradually has had some return of the nausea type symptoms that she was having prior to the operation. This is not as nearly as severe as she was having prior to the operation but they do seem to be creeping back and she is having a little bit of increased nausea. She had some initial postoperative pain for the first few days but that is all gone. Otherwise, she is doing quite well.       Past Medical History  Allergic rhinitis, chronic; Anxiety; Arteriosclerotic cardiovascular disease (ASCVD); Arthritis; Arthritis; Breast lump; Cervical spine pain; Chest pain; Chronic Obstructive Pulmonary Disease; COPD; COPD (chronic obstructive pulmonary disease); Degenerative Disc Disease ; Depression; Esophageal reflux; Fibromyalgia; GERD; Heart attack; Heart Attack; Herniated Disc; High blood pressure; High cholesterol; Hyperlipemia; Hyperlipidemia; Hypertension; Hypertension, Benign Essential; Hypertension, essential; Kidney stones; Limb Swelling; Mood disorder; Muscle cramps; Pain, Lumbar; Reflux; Renal stones; Shortness Of Air; Shortness of Breath         Past Surgical History  Cervical epidural steroid injections; Cesarian Section; EGD;  "Hysterectomy; Hysterectomy-Abdominal; Lumbar epidural steroid injections; Oopherectomy; Tubal ligation         Medication List  Breo Ellipta 100-25 mcg/dose inhalation blister with device; Claritin 10 mg oral tablet; lisinopril 20 mg oral tablet; OxyContin 15 mg oral tablet,oral only,ext.rel.12 hr; sertraline 100 mg oral tablet; Spiriva Respimat 1.25 mcg/actuation inhalation mist; tamsulosin 0.4 mg oral capsule         Allergy List  Morphine Sulfate; SULFA (SULFONAMIDES)       Allergies Reconciled  Family Medical History  Lung Neoplasm, Malignant; Cancer, Unspecified; Diabetes, unspecified type; Spine Problems; Heart Attack (MI); Renal Calculus; Bladder calculus; Osteoporosis; Family history of Arthritis; Family history of cancer; Family history of heart disease; Family history of diabetes mellitus         Social History  Alcohol Use; Caffeine (Current every day); ; lives with other; Recreational Drug Use (Never); Second hand smoke exposure (Current some day); Tobacco (Current every day); Unemployed         Review of Systems  · Gastrointestinal  o Denies  o : nausea, vomiting, diarrhea, constipation      Vitals  Date Time BP Position Site L\R Cuff Size HR RR TEMP (F) WT  HT  BMI kg/m2 BSA m2 O2 Sat        08/20/2020 10:40 AM         232lbs 6oz 5'  5\" 38.67 2.2           Physical Examination  · Constitutional  o Appearance  o : well developed, well-nourished, alert and in no acute distress  · Head and Face  o Head  o :   § Inspection  § : no deformities or lesions  · Eyes  o Conjunctivae  o : clear  o Sclerae  o : clear  · Neck  o Inspection/Palpation  o : normal appearance, no masses or tenderness, trachea midline  · Respiratory  o Respiratory Effort  o : breathing unlabored  o Inspection of Chest  o : normal appearance, no retractions  · Cardiovascular  o Heart  o : regular rate and rhythm  · Gastrointestinal  o Abdominal Examination  o : abdomen is soft.   · Lymphatic  o Neck  o : no lymphadenopathy " present  o Axilla  o : no lymphadenopathy present  o Groin  o : no lymphadenopathy present  · Skin and Subcutaneous Tissue  o General Inspection  o : no rashes present, no lesions present, no areas of discoloration  · Neurologic  o Cranial Nerves  o : grossly intact  o Sensation  o : grossly intact  o Gait and Station  o :   § Gait Screening  § : normal gait, able to stand without diffculty  o Cerebellar Function  o : no obvious abnormalities  · Psychiatric  o Judgement and Insight  o : judgment and insight intact  o Mood and Affect  o : mood normal, affect appropriate          Assessment  · Pre-Surgical Orders     V72.84  · Abdominal Pain, Epigastric     789.06/R10.13  · GERD     530.81  · Pre-op testing     V72.84/Z01.818       Patient status post cholecystectomy. Her pathology showed chronic cholecystitis, gallstones, and a benign lymph node, which is very good as the preoperative imaging had some concern for gallbladder cancer.     Problems Reconciled  Plan  · Orders  o Endoscopy (85833) - 789.06/R10.13, 530.81 - 08/28/2020  o [facility] Pre-Op Covid-19 Screening (38355) - V72.84/Z01.818 - 08/24/2020   [address]  -- 08/24 @ 12 NOON  · Medications  o Medications have been Reconciled  o Transition of Care or Provider Policy  · Instructions  o PLAN:   o Handouts Provided-Pre-Procedure Instructions including date and time and location of procedure.  o Surgical Facility: [facility]  o ****Patient Status****  o Outpatient  o ********************  o RISK AND BENEFITS:  o Consent for surgery: Given these options, the patient has verbally expressed an understanding of the risks of surgery and finds these risks acceptable. We will proceed with surgery as soon as possible.  o Consult Anesthesia for any post-operative block, or any pain management procedure deemed necessary by the anestesiologist for adequate post-operative pain control.   o O.R. PREP: Per protocol  o IV: Per Anesthesia  o PLEASE SIGN PERMIT  FOR: EGD ENDOSCOPY WITH POSSIBLE BIOPSIES  o *___The above History and Physical Examination has been completed within 30 days of admission.  o Electronically Identified Patient Education Materials Provided Electronically     No signs of gallbladder cancer in her specimens and we even got a benign lymph node so I am very pleased with that. It is disappointing that her nausea and some of her preoperative symptoms are returning. The patient reports that she had an EGD about two years ago and, at that time, she had some gastritis and her endoscopist that she get a repeat EGD in six months, which she did not follow-up on. I think with her repeat nausea and the fact that she should have already gotten a repeat EGD, it might be beneficial to get her another EGD and we can test her for H. Pylori at that time as well. Risks, benefits, and alternatives were discussed with the patient extensively. All questions were answered. The patient voiced understanding and agrees to proceed with the above plan.             Electronically Signed by: Dunia Wild-, -Author on August 21, 2020 09:28:02 AM  Electronically Co-signed by: Jose Ledesma MD -Reviewer on August 21, 2020 02:55:37 PM

## 2021-05-14 VITALS
DIASTOLIC BLOOD PRESSURE: 71 MMHG | HEART RATE: 103 BPM | SYSTOLIC BLOOD PRESSURE: 115 MMHG | WEIGHT: 227 LBS | OXYGEN SATURATION: 93 % | BODY MASS INDEX: 37.82 KG/M2 | HEIGHT: 65 IN

## 2021-05-14 VITALS
SYSTOLIC BLOOD PRESSURE: 111 MMHG | HEIGHT: 65 IN | BODY MASS INDEX: 37.9 KG/M2 | TEMPERATURE: 97.5 F | WEIGHT: 227.5 LBS | DIASTOLIC BLOOD PRESSURE: 72 MMHG | HEART RATE: 101 BPM

## 2021-05-14 VITALS — WEIGHT: 235 LBS | RESPIRATION RATE: 15 BRPM | HEIGHT: 65 IN | BODY MASS INDEX: 39.15 KG/M2

## 2021-05-14 VITALS — BODY MASS INDEX: 38.65 KG/M2 | RESPIRATION RATE: 16 BRPM | WEIGHT: 232 LBS | HEIGHT: 65 IN

## 2021-05-15 VITALS — HEIGHT: 65 IN | BODY MASS INDEX: 37.49 KG/M2 | RESPIRATION RATE: 12 BRPM | WEIGHT: 225 LBS

## 2021-05-15 VITALS — BODY MASS INDEX: 39.99 KG/M2 | WEIGHT: 240 LBS | RESPIRATION RATE: 15 BRPM | HEIGHT: 65 IN

## 2021-05-15 VITALS
HEART RATE: 101 BPM | BODY MASS INDEX: 36.91 KG/M2 | SYSTOLIC BLOOD PRESSURE: 130 MMHG | HEIGHT: 65 IN | WEIGHT: 221.56 LBS | DIASTOLIC BLOOD PRESSURE: 92 MMHG

## 2021-05-15 VITALS
HEART RATE: 100 BPM | DIASTOLIC BLOOD PRESSURE: 79 MMHG | HEIGHT: 65 IN | WEIGHT: 223.37 LBS | BODY MASS INDEX: 37.22 KG/M2 | SYSTOLIC BLOOD PRESSURE: 124 MMHG

## 2021-05-15 VITALS
HEIGHT: 65 IN | WEIGHT: 226 LBS | TEMPERATURE: 98.4 F | SYSTOLIC BLOOD PRESSURE: 118 MMHG | DIASTOLIC BLOOD PRESSURE: 74 MMHG | HEART RATE: 81 BPM | BODY MASS INDEX: 37.65 KG/M2

## 2021-05-15 VITALS — WEIGHT: 232.37 LBS | BODY MASS INDEX: 38.71 KG/M2 | HEIGHT: 65 IN

## 2021-05-16 VITALS — BODY MASS INDEX: 35.32 KG/M2 | RESPIRATION RATE: 14 BRPM | HEIGHT: 65 IN | WEIGHT: 212 LBS

## 2021-05-16 VITALS — WEIGHT: 210 LBS | RESPIRATION RATE: 14 BRPM | BODY MASS INDEX: 34.99 KG/M2 | HEIGHT: 65 IN

## 2021-05-23 ENCOUNTER — TRANSCRIBE ORDERS (OUTPATIENT)
Dept: ADMINISTRATIVE | Facility: HOSPITAL | Age: 56
End: 2021-05-23

## 2021-05-23 DIAGNOSIS — Z12.31 VISIT FOR SCREENING MAMMOGRAM: Primary | ICD-10-CM

## 2021-05-28 VITALS
RESPIRATION RATE: 16 BRPM | OXYGEN SATURATION: 99 % | TEMPERATURE: 98.2 F | DIASTOLIC BLOOD PRESSURE: 88 MMHG | WEIGHT: 213.5 LBS | HEART RATE: 95 BPM | HEIGHT: 65 IN | BODY MASS INDEX: 35.57 KG/M2 | SYSTOLIC BLOOD PRESSURE: 132 MMHG

## 2021-05-28 NOTE — PROGRESS NOTES
Patient: SYED REVELES     Acct: [id number]     Report: #[id number]  UNIT #: [id number]     : 1965    Encounter Date:2018  PRIMARY CARE: TYLER SAAVEDRA  ***Signed***  --------------------------------------------------------------------------------------------------------------------  Chief Complaint      Encounter Date      2018            Primary Care Provider      FORTINO OLIVAS            Referring Provider      FORTINO OLIVAS            Patient Complaint      Patient is complaining of      Pt is here for cough/sores in mouth            VITALS      Height 5 ft 5 in / 165.1 cm      Weight 213 lbs 8 oz / 96.487692 kg      BSA 2.15 m2      BMI 35.5 kg/m2      Temperature 98.2 F / 36.78 C - Oral      Pulse 95      Respirations 16      Blood Pressure 132/88 Sitting, Right Arm      Pulse Oximetry 99%, room air      Exhaled Nitrous Oxide Testin            HPI      The patient is a very pleasant 52 year old white female with a history of COPD     with emphysema, ongoing cigarette smoking, alpha 1 antitrypsin heterozygosity     and obstructive sleep apnea.  She presents today complaining of recurrent lung     infectious saying that she has had flare ups of her COPD or episodes of     bronchitis every 1-2 months.  She currently reports that she has been more     short of breath, coughing up green sputum, wheezing and having more dyspnea on     exertion relieved with rest for the past week or so.  She has also had issues     lately with using the breo and Spiriva.  She states that she had been compliant     with them for the past several months, but that she has been getting thrush     very frequently and has had a lot of issues with mouth soreness and things of     that nature even through she does wash her mouth out thoroughly after each use.      She reports that she has been treated for thrush by her PCP with Nystatin     swish and swallow and that it does clear up, but  it recurs with the inhalers     that she is using. She reports today that she does want to quit smoking. She is     currently smoking about 10-12 cigarettes per day.  She denies fever, chills or     hemoptysis.              I have reviewed her ROS, medical, surgical and family history and agree with     those as entered in the chart.            ROS      Constitutional:  Complains of: Fatigue, Denies: Fever, Weight gain, Weight loss    , Chills, Insomnia, Other      Respiratory/Breathing:  Complains of: Shortness of air, Wheezing, Cough, Denies    : Hemoptysis, Pleuritic pain, Other      Endocrine:  Denies: Polydipsia, Polyuria, Heat/cold intolerance, Abnorml     menstrual pattern, Diabetes, Other      Eyes:  Denies: Blurred vision, Vision Changes, Other      Ears, nose, mouth, throat:  Denies: Congestion, Dysphagia, Hearing Changes,     Nose Bleeding, Nasal Discharge, Throat pain, Tinnitus, Other      Cardiovascular:  Denies: Chest Pain, Exertional dyspnea, Peripheral Edema,     Palpitations, Syncope, Wake up Gasping for air, Orthopnea, Tachycardia, Other      Gastrointestinal:  Denies: Abdominal pain/cramping, Bloody stools, Constipation    , Diarrhea, Melena, Nausea, Vomiting, Other      Genitourinary:  Denies: Dysuria, Urinary frequency, Incontinence, Hematuria,     Urgency, Other      Musculoskeletal:  Denies: Joint Pain, Joint Stiffness, Joint Swelling, Myalgias    , Other      Hematologic/lymphatic:  DENIES: Lymphadenopathy, Bruising, Bleeding tendencies,     Other      Neurologic:  Denies: Headache, Numbness, Weakness, Seizures, Other      Psychiatric:  Denies: Anxiety, Appropriate Effect, Depression, Other      Sleep:  No: Excessive daytime sleep, Morning Headache?, Snoring, Insomnia?,     Stop breathing at sleep?, Other      Integumentary:  Denies: Rash, Dry skin, Skin Warm to Touch, Other            FAMILY/SOCIAL/MEDICAL HX      Surgical History:  Yes: Other Surgeries ( x 2; hysterectomy), No: AAA      Repair, Abdominal Surgery, Angioplasty, Appendectomy, Back Surgery, Bladder     Surgery, Bowel Surgery, Breast Surgery, CABG, Carotid Stenosis, Cholecystectomy    , Ear Surgery, Eye Surgery, Head Surgery, Hernia Surgery, Kidney Surgery, Nose     Surgery, Oral Surgery, Orthopedic Surgery, Prostatectomy, Rectal Surgery,     Spinal Surgery, Testicular Surgery, Throat Surgery, Valve Replacement, Vascular     Surgery      Heart - Family Hx:  Father      Diabetes - Family Hx:  Mother, Father      Cancer/Type - Family Hx:  Mother (lung), Father (lung)      Is Father Still Living?:  No      Is Mother Still Living?:  No       Family History:  Yes      Social History:  Tobacco Use, No Alcohol Use, No Recreational Drug use      Smoking status:  Current every day smoker (1 ppd x 35 y)      Smoking history:  > 50 pack years      Counseling given:  Counseling > 3 minutes, Support medications      Exercise Activity:  None (daily activity)      Occupation:  unemployed      Whom do you live with?:  ex father in law       Section:  Yes      Hysterectomy:  Yes ()      Anticoagulation Therapy:  No      Antibiotic Prophylaxis:  No      Medical History:  Yes: Arthritis (FIBROMYALGIA-OSTERARTHRITIS-OSTEOPOROISIS),     Chronic Bronchitis/COPD, Depression, Heart Attack (STATES SHOWED ON EKG 3 YEARS     AGO), Hemorrhoids/Rectal Prob (ACID REFLUX), High Blood Pressure (ON MEDS),     Reflux Disease, Shortness Of Breath (INHALERS), No: Alcoholism, Allergies,     Anemia, Asthma, Blood Disease, Broken Bones, Cataracts, Chemical Dependency,     Chemotherapy/Cancer, Emphysema, Chronic Liver Disease, Colon Trouble, Colitis,     Diverticulitis, Congestive Heart Failu, Deafness or Ringing Ears, Convulsions,     Anxiety, Bipolar Disorder, Diabetes, Epilepsy, Seizures, Forgetfullness,     Glaucoma, Gall Stones, Gout, Head Injury, Heart Murmur, Hepatitis, Hiatal Hernia    , High Cholesterol, HIV (Do not ask - volu, Jaundice, Kidney or  Bladder Disease    , Kidney Stones, Migrane Headaches, Mitral Valve Prolapse, Night sweats,     Phlebitis, Psychiatric Care, Rheumatic Fever, Sexually Transmitted Dis, Sinus     Trouble, Skin Disease/Psoriais/Ecz, Stroke, Thyroid Problem, Tuberculosis or     Pos TB Te, Miscellaneous Medical/oth      Psychiatric History      depression            PREVENTION      Hx Influenza Vaccination:  No      Influenza Vaccine Declined:  Yes      2 or More Falls Past Year?:  No      Fall Past Year with Injury?:  No      Hx Pneumococcal Vaccination:  No      Encouraged to follow-up with:  PCP regarding preventative exams.      Chart initiated by      Shyla Hussein MA            ALLERGIES/MEDICATIONS      Allergies:        Coded Allergies:             *No Known Allergies (Verified  Allergy, Unknown, 4/16/18)      Medications    Last Reconciled on 4/16/18 15:00 by BRITTANI ROGERS PA-C      predniSONE* (predniSONE*) 10 Mg Tablet      10 MG PO ASDIR, #48 TAB         Prov: Coleen Rogers PA-C         4/16/18       Amoxicillin/Clavulanic Acid 875/125 (Augmentin 875/125) 1 Each Tablet      875 MG PO BID for 7 Days, #14 TAB 0 Refills         Prov: Coleen Rogers PA-C         4/16/18       Nicotine 14 Mg Patch (Nicoderm 14 Mg Patch) 1 Each Patch.td24      14 MG TRANSDERM QDAY, #30 PATCH 4 Refills         Prov: Coleen Rogers PA-C         4/16/18       Varenicline Tartrate (Chantix) 1 Mg Tablet      1 MG PO BID for 30 Days, #60 TAB 4 Refills         Prov: Coleen Rogers PA-C         4/16/18       Varenicline (Chantix Dose Dmitri) 1 Each Tab.ds.pk      0.5 MG PO ASDIR for 30 Days, #1 PACKAGE 0 Refills         Prov: Coleen Rogers PA-C         4/16/18       Neb-Budesonide (Pulmicort) 0.5 Mg/2 Ml Ampul.neb      0.5 MG INH BID, #60 NEB 4 Refills         Prov: Coleen Rogers PA-C         4/16/18       Arformoterol Tartrate (Brovana) 15 Mcg/2 Ml Vial.neb      15 MCG INH BID, #60 NEB 4 Refills         Prov: Coleen Rogers PA-C         4/16/18       Isosorbide  Dinitrate (Isosorbide Dinitrate) 30 Mg Tablet      30 MG PO HS, TAB         Reported         1/25/18       oxyCODONE ER (oxyCONTIN) 20 Mg Tab.er.12h      20 MG PO TID, TAB.SR         Reported         1/25/18       Lansoprazole (Prevacid*) 30 Mg Capsule.dr      30 MG PO BID, CAP         Reported         6/3/15       Sertraline HCl (Sertraline*) 100 Mg Tablet      100 MG PO QDAY, #30 TAB 0 Refills         Reported         6/3/15       Lisinopril* (Lisinopril*) 40 Mg Tablet      1 TAB PO QDAY         Reported         3/14/12      Current Medications      Current Medications Reviewed 4/16/18            EXAM      Vital Signs Reviewed.      General:  WDWN, Alert, NAD.      HEENT: PERRL, EOMI.  OP, nares clear, no sinus tenderness.      Neck: Supple, no JVD, no thyromegaly.      Lymph: No axillary, cervical, supraclavicular lymphadenopathy noted bilaterally.      Chest: Lungs have mildly decreased breath sounds throughout, scattered     expiratory wheezing and rhonchi.  No crackles appreciated. Normal work of     breathing noted.        CV: RRR, no MGR, pulses 2+, equal.        Abd: Soft, NT, ND, +BS, no HSM.      EXT: No clubbing, no cyanosis, no edema, no joint tenderness.        Neuro:  A  Skin: No rashes or lesions.      Vitals      Vitals:             Height 5 ft 5 in / 165.1 cm           Weight 213 lbs 8 oz / 96.746789 kg           BSA 2.15 m2           BMI 35.5 kg/m2           Temperature 98.2 F / 36.78 C - Oral           Pulse 95           Respirations 16           Blood Pressure 132/88 Sitting, Right Arm           Pulse Oximetry 99%, room air            REVIEW      Results Reviewed      PCCS Results Reviewed?:  Yes Prev Lab Results, Yes Prev Radiology Results, Yes     Previous Mecial Records      Lab Results      I personally reviewed her prior provider notes, lab work and imaging.            PLAN      Assessment      COPD with exacerbation - J44.1            Bronchitis - J40            Tobacco abuse - Z72.0             Alpha-1-antitrypsin deficiency carrier - E88.01            JAIN (dyspnea on exertion) - R06.09            Cough - R05            Wheezing - R06.2            Notes      New Medications      * ARFORMOTEROL TARTRATE (Brovana) 15 MCG/2 ML VIAL.NEB: 15 MCG INH BID #60       Instructions: DIAGNOSIS CODE REQUIRED PRIOR TO PRESCRIBING.       Dx: COPD with exacerbation - J44.1      * Neb-Budesonide (Pulmicort) 0.5 MG/2 ML AMPUL.NEB: 0.5 MG INH BID #60       Instructions: DIAGNOSIS CODE REQUIRED PRIOR TO PRESCRIBING.       Dx: COPD with exacerbation - J44.1      * Varenicline (Chantix Dose Dmitri) 1 EACH TAB.DS.PK: 0.5 MG PO ASDIR 30 Days #1       Instructions: ADM PER DIRECTIONS ON PACKAGE       Dx: COPD with exacerbation - J44.1      * Varenicline Tartrate (Chantix) 1 MG TABLET: 1 MG PO BID 30 Days #60       Instructions: FOR SMOKING CESSATION       Dx: COPD with exacerbation - J44.1      * Nicotine 14 Mg Patch (Nicoderm 14 Mg Patch) 1 EACH PATCH.TD24: 14 MG     TRANSDERM QDAY #30       Dx: COPD with exacerbation - J44.1      * Amoxicillin/Clavulanic Acid 875/125 (Augmentin 875/125) 1 EACH TABLET: 875 MG     PO BID 7 Days #14       Dx: COPD with exacerbation - J44.1      * predniSONE* 10 MG TABLET: 10 MG PO ASDIR #48       Instructions: Take 60 mg by mouth x 3 days, 40 mg x 3 days, 30 mg x 3 days, 20     mg x 3 days, then 10 mg x 3 days.       Dx: COPD with exacerbation - J44.1      Discontinued Medications      * MDI-Albuterol (Proventil HFA) 60 PUFFS/INH PUFFS: 1 PUFF INH Q4HR PRN DYSPNEA     28 Days #1      * TIOTROPIUM BROMIDE (Spiriva Respimat 2.5 mcg/Puff) 4 GM MIST.INHAL: 2 PUFFS     INH RTQDAY #1      * Fluticasone/Vilanterol 200-25 Mcg Inh (Breo Ellipta 200-25 Mcg Inh) 1 EACH     BLST.W.DEV: 1 PUFF INH QDAY #1       Instructions: to replace symbicort       Dx: Emphysema of lung - J43.9      New Diagnostics      * Chest 2 View, As Soon As Possible       Dx: COPD with exacerbation - J44.1      * CBC, As Soon As  Possible       Dx: COPD with exacerbation - J44.1      * Immunoglobulin  E (I, As Soon As Possible       Dx: COPD with exacerbation - J44.1      * Sputum Culture W/Gram Stain, As Soon As Possible       Dx: COPD with exacerbation - J44.1      ASSESSMENT:       1. COPD with acute exacerbation.      2.  Probable bronchitis.      3.  Cough.      4. Wheezing.      5.  Dyspnea on exertion.      6. Seasonal allergies.      7. Ongoing tobacco abuse with cigarettes desiring to quit currently.        8.  Alpha 1 antitrypsin deficiency with heterozygosity.      9.  Obstructive sleep apnea.      10.  Recurrent lung infections.        11.  Recurrent thrush with current inhalers.              PLAN:       1.  At this time for patient's COPD exacerbation and probable bronchitis, I     will give her a course of prednisone and Augmentin.  I will also check a sputum     culture and a chest x-ray so that antibiotic therapy can be tailored as     appropriate.  I suspect that the breo is contributing to her frequent oral     thrush since it is a powder formulation.  At this time, I will discontinue her     Breo and start her on Brovana and Pulmicort nebs twice a day and have her     continue Spiriva Respimat 2.5.  She does have a nebulizer at home. I have given     her samples of Brovana and have shown her how to use that today.        2.  I have counseled her for smoking cessation for 10 minutes today.  She would     like to try Chantix and I have counseled her that using Chantix along with     nicotine patches will make her more likely to successfully quit smoking. I have     counseled her to avoid triggers of smoking, plan of quit date and gradually     wean down by one cigarette per week. I have prescribed Chantix and nicotine     patches for her today.  I will also check a CBC and IgE since she has had     recurrent lung infections lately.        3.  I will have her keep her next follow up appointment with us in about six     weeks  with Dr. Cotto, return sooner if needed.            Patient Education      Patient Education Provided:  Acute Bronchitis, COPD, How to use an Inhaler, How     to use a Nebulizer      Time Spent:  > 50% /[facility]            Patient Education:        Bronchitis (Adult)      COPD                 Disclaimer: Converted document may not contain table formatting or lab diagrams. Please see [employer] for the authenticated document.

## 2021-07-16 ENCOUNTER — TELEPHONE (OUTPATIENT)
Dept: FAMILY MEDICINE CLINIC | Facility: CLINIC | Age: 56
End: 2021-07-16

## 2021-07-16 DIAGNOSIS — J44.1 COPD WITH EXACERBATION (HCC): Primary | ICD-10-CM

## 2021-07-16 NOTE — TELEPHONE ENCOUNTER
Caller: Lindsey Mckinnon    Relationship: Self    Best call back number: [phone]    Medication needed:     BREO INHALER TWICE DAILY     When do you need the refill by: ASAP     What additional details did the patient provide when requesting the medication: OUT OF MEDICATION. INSURANCE WOULDN'T PAY FOR MEDICATION. PRESCRIPTION NEEDS A PA FROM DOCTOR.     Does the patient have less than a 3 day supply:  [x] Yes  [] No    What is the patient's preferred pharmacy: [id number] - [place], [place] - [address]  AT [facility] [address] - [phone] [facility] [phone] FX

## 2021-07-21 NOTE — TELEPHONE ENCOUNTER
PATIENT CALLED AGAIN TODAY WANTING TO KNOW WHEN SHE COULD GET HER PERSCRIPTIONS. I LET HER KNOW THE PA WAS PUT IN. SHE THEN SAID SHE WANTED TO HAVE A SCRIPT FOR SPIRIVA INHALER PUT IN ALSO.

## 2021-07-28 ENCOUNTER — TELEPHONE (OUTPATIENT)
Dept: FAMILY MEDICINE CLINIC | Facility: CLINIC | Age: 56
End: 2021-07-28

## 2021-07-28 NOTE — TELEPHONE ENCOUNTER
Caller: Lindsey Mckinnon    Relationship: Self    Best call back number: [phone]    What medication are you requesting:SOMETHING FOR BRONCHITIS AND UTI    What are your current symptoms: PAINFUL URINATION    How long have you been experiencing symptoms: WEEKS    Have you had these symptoms before:    [x] Yes  [] No    Have you been treated for these symptoms before:   [x] Yes  [] No    If a prescription is needed, what is your preferred pharmacy and phone number:    [facility] #[id number] - [place] - [address] AT [facility] [address] - [phone]  - [phone] FX     Additional notes:

## 2021-08-12 ENCOUNTER — LAB (OUTPATIENT)
Dept: LAB | Facility: HOSPITAL | Age: 56
End: 2021-08-12

## 2021-08-12 ENCOUNTER — TRANSCRIBE ORDERS (OUTPATIENT)
Dept: FAMILY MEDICINE CLINIC | Facility: CLINIC | Age: 56
End: 2021-08-12

## 2021-08-12 DIAGNOSIS — R82.81 PYURIA: ICD-10-CM

## 2021-08-12 DIAGNOSIS — I10 ESSENTIAL HYPERTENSION, MALIGNANT: ICD-10-CM

## 2021-08-12 DIAGNOSIS — E78.5 HYPERLIPIDEMIA, UNSPECIFIED HYPERLIPIDEMIA TYPE: Primary | ICD-10-CM

## 2021-08-12 DIAGNOSIS — E78.5 HYPERLIPIDEMIA, UNSPECIFIED HYPERLIPIDEMIA TYPE: ICD-10-CM

## 2021-08-12 DIAGNOSIS — R73.01 IMPAIRED FASTING GLUCOSE: ICD-10-CM

## 2021-08-12 LAB
25(OH)D3 SERPL-MCNC: 15.3 NG/ML
ALBUMIN SERPL-MCNC: 3.8 G/DL (ref 3.5–5.2)
ALBUMIN/GLOB SERPL: 1.2 G/DL
ALP SERPL-CCNC: 75 U/L (ref 39–117)
ALT SERPL W P-5'-P-CCNC: 17 U/L (ref 1–33)
ANION GAP SERPL CALCULATED.3IONS-SCNC: 9.8 MMOL/L (ref 5–15)
AST SERPL-CCNC: 12 U/L (ref 1–32)
BASOPHILS # BLD AUTO: 0.08 10*3/MM3 (ref 0–0.2)
BASOPHILS NFR BLD AUTO: 0.7 % (ref 0–1.5)
BILIRUB SERPL-MCNC: 0.3 MG/DL (ref 0–1.2)
BILIRUB UR QL STRIP: NEGATIVE
BUN SERPL-MCNC: 9 MG/DL (ref 6–20)
BUN/CREAT SERPL: 12.7 (ref 7–25)
CALCIUM SPEC-SCNC: 9.1 MG/DL (ref 8.6–10.5)
CHLORIDE SERPL-SCNC: 106 MMOL/L (ref 98–107)
CHOLEST SERPL-MCNC: 212 MG/DL (ref 0–200)
CLARITY UR: CLEAR
CO2 SERPL-SCNC: 23.2 MMOL/L (ref 22–29)
COLOR UR: ABNORMAL
CREAT SERPL-MCNC: 0.71 MG/DL (ref 0.57–1)
DEPRECATED RDW RBC AUTO: 47.5 FL (ref 37–54)
EOSINOPHIL # BLD AUTO: 0.14 10*3/MM3 (ref 0–0.4)
EOSINOPHIL NFR BLD AUTO: 1.2 % (ref 0.3–6.2)
ERYTHROCYTE [DISTWIDTH] IN BLOOD BY AUTOMATED COUNT: 13.6 % (ref 12.3–15.4)
GFR SERPL CREATININE-BSD FRML MDRD: 85 ML/MIN/1.73
GLOBULIN UR ELPH-MCNC: 3.1 GM/DL
GLUCOSE SERPL-MCNC: 116 MG/DL (ref 65–99)
GLUCOSE UR STRIP-MCNC: NEGATIVE MG/DL
HCT VFR BLD AUTO: 46.4 % (ref 34–46.6)
HDLC SERPL-MCNC: 44 MG/DL (ref 40–60)
HGB BLD-MCNC: 14.8 G/DL (ref 12–15.9)
HGB UR QL STRIP.AUTO: NEGATIVE
IMM GRANULOCYTES # BLD AUTO: 0.08 10*3/MM3 (ref 0–0.05)
IMM GRANULOCYTES NFR BLD AUTO: 0.7 % (ref 0–0.5)
KETONES UR QL STRIP: ABNORMAL
LDLC SERPL CALC-MCNC: 143 MG/DL (ref 0–100)
LDLC/HDLC SERPL: 3.2 {RATIO}
LEUKOCYTE ESTERASE UR QL STRIP.AUTO: ABNORMAL
LYMPHOCYTES # BLD AUTO: 2.83 10*3/MM3 (ref 0.7–3.1)
LYMPHOCYTES NFR BLD AUTO: 23.5 % (ref 19.6–45.3)
MCH RBC QN AUTO: 30.5 PG (ref 26.6–33)
MCHC RBC AUTO-ENTMCNC: 31.9 G/DL (ref 31.5–35.7)
MCV RBC AUTO: 95.5 FL (ref 79–97)
MONOCYTES # BLD AUTO: 1.04 10*3/MM3 (ref 0.1–0.9)
MONOCYTES NFR BLD AUTO: 8.6 % (ref 5–12)
NEUTROPHILS NFR BLD AUTO: 65.3 % (ref 42.7–76)
NEUTROPHILS NFR BLD AUTO: 7.88 10*3/MM3 (ref 1.7–7)
NITRITE UR QL STRIP: NEGATIVE
NRBC BLD AUTO-RTO: 0 /100 WBC (ref 0–0.2)
PH UR STRIP.AUTO: 5.5 [PH] (ref 5–8)
PLATELET # BLD AUTO: 305 10*3/MM3 (ref 140–450)
PMV BLD AUTO: 11 FL (ref 6–12)
POTASSIUM SERPL-SCNC: 4 MMOL/L (ref 3.5–5.2)
PROT SERPL-MCNC: 6.9 G/DL (ref 6–8.5)
PROT UR QL STRIP: NEGATIVE
RBC # BLD AUTO: 4.86 10*6/MM3 (ref 3.77–5.28)
SODIUM SERPL-SCNC: 139 MMOL/L (ref 136–145)
SP GR UR STRIP: 1.02 (ref 1–1.03)
TRIGL SERPL-MCNC: 136 MG/DL (ref 0–150)
UROBILINOGEN UR QL STRIP: ABNORMAL
VLDLC SERPL-MCNC: 25 MG/DL (ref 5–40)
WBC # BLD AUTO: 12.05 10*3/MM3 (ref 3.4–10.8)

## 2021-08-12 PROCEDURE — 83036 HEMOGLOBIN GLYCOSYLATED A1C: CPT

## 2021-08-12 PROCEDURE — 84443 ASSAY THYROID STIM HORMONE: CPT

## 2021-08-12 PROCEDURE — 80053 COMPREHEN METABOLIC PANEL: CPT

## 2021-08-12 PROCEDURE — 85025 COMPLETE CBC W/AUTO DIFF WBC: CPT

## 2021-08-12 PROCEDURE — 36415 COLL VENOUS BLD VENIPUNCTURE: CPT

## 2021-08-12 PROCEDURE — 84439 ASSAY OF FREE THYROXINE: CPT

## 2021-08-12 PROCEDURE — 81001 URINALYSIS AUTO W/SCOPE: CPT

## 2021-08-12 PROCEDURE — 87086 URINE CULTURE/COLONY COUNT: CPT

## 2021-08-12 PROCEDURE — 80061 LIPID PANEL: CPT

## 2021-08-12 PROCEDURE — 82306 VITAMIN D 25 HYDROXY: CPT

## 2021-08-13 ENCOUNTER — TELEPHONE (OUTPATIENT)
Dept: FAMILY MEDICINE CLINIC | Facility: CLINIC | Age: 56
End: 2021-08-13

## 2021-08-13 DIAGNOSIS — R82.81 PYURIA: Primary | ICD-10-CM

## 2021-08-13 DIAGNOSIS — R73.01 IMPAIRED FASTING GLUCOSE: Primary | ICD-10-CM

## 2021-08-13 LAB
BACTERIA UR QL AUTO: ABNORMAL /HPF
HBA1C MFR BLD: 5.8 % (ref 4.8–5.6)
HYALINE CASTS UR QL AUTO: ABNORMAL /LPF
MUCOUS THREADS URNS QL MICRO: ABNORMAL /HPF
RBC # UR: ABNORMAL /HPF
REF LAB TEST METHOD: ABNORMAL
SQUAMOUS #/AREA URNS HPF: ABNORMAL /HPF
T4 FREE SERPL-MCNC: 1.05 NG/DL (ref 0.93–1.7)
TSH SERPL DL<=0.05 MIU/L-ACNC: 1.39 UIU/ML (ref 0.27–4.2)
WBC UR QL AUTO: ABNORMAL /HPF

## 2021-08-13 NOTE — TELEPHONE ENCOUNTER
Caller: Lindsey Mckinnon    Relationship: Self    Best call back number: [phone]    What medication are you requesting: ANITBIOTIC FOR UTI THAT IS NOT GOING AWAY WITH THE MEDICATION SHE HAS BEEN GIVEN BY URGENT CARE VISITS    How long have you been experiencing symptoms: 6 WEEKS    Have you had these symptoms before:    [x] Yes  [] No    Have you been treated for these symptoms before:   [x] Yes  [] No    If a prescription is needed, what is your preferred pharmacy and phone number: [id number] - [place], [place] - [address]  AT [facility] [address] - [phone]  - [phone] FX     Additional notes:

## 2021-08-14 LAB — BACTERIA SPEC AEROBE CULT: NO GROWTH

## 2021-08-15 PROBLEM — G43.909 MIGRAINE: Status: ACTIVE | Noted: 2021-08-15

## 2021-08-15 PROBLEM — Z79.891 LONG-TERM CURRENT USE OF OPIATE ANALGESIC: Status: ACTIVE | Noted: 2019-06-11

## 2021-08-15 PROBLEM — F41.9 ANXIETY: Status: ACTIVE | Noted: 2021-08-15

## 2021-08-15 PROBLEM — M54.50 LOW BACK PAIN: Status: ACTIVE | Noted: 2021-08-15

## 2021-08-15 PROBLEM — M51.369 DEGENERATION OF LUMBAR INTERVERTEBRAL DISC: Status: ACTIVE | Noted: 2018-11-27

## 2021-08-15 PROBLEM — F17.200 NICOTINE DEPENDENCE: Status: ACTIVE | Noted: 2021-04-15

## 2021-08-15 PROBLEM — M54.2 CERVICALGIA: Status: ACTIVE | Noted: 2021-08-15

## 2021-08-15 PROBLEM — I25.10 ARTERIOSCLEROTIC CARDIOVASCULAR DISEASE (ASCVD): Status: ACTIVE | Noted: 2021-08-15

## 2021-08-15 PROBLEM — N20.0 CALCULUS OF KIDNEY: Status: ACTIVE | Noted: 2021-08-15

## 2021-08-15 PROBLEM — K80.20 GALL STONES: Status: ACTIVE | Noted: 2021-08-15

## 2021-08-15 PROBLEM — K64.9 HEMORRHOID: Status: ACTIVE | Noted: 2021-08-15

## 2021-08-15 PROBLEM — M51.36 DEGENERATION OF LUMBAR INTERVERTEBRAL DISC: Status: ACTIVE | Noted: 2018-11-27

## 2021-08-15 PROBLEM — D64.9 ANEMIA: Status: ACTIVE | Noted: 2021-08-15

## 2021-08-15 PROBLEM — F32.A DEPRESSION: Status: ACTIVE | Noted: 2021-08-15

## 2021-08-15 PROBLEM — Z51.81 MEDICATION MONITORING ENCOUNTER: Status: ACTIVE | Noted: 2019-05-10

## 2021-08-15 PROBLEM — I21.9 HEART ATTACK: Status: ACTIVE | Noted: 2021-08-15

## 2021-08-15 PROBLEM — J30.9 ALLERGIC RHINITIS: Status: ACTIVE | Noted: 2021-08-15

## 2021-08-15 PROBLEM — G25.2 CONTINUOUS TREMOR: Status: ACTIVE | Noted: 2020-12-30

## 2021-08-15 PROBLEM — I10 ESSENTIAL HYPERTENSION: Status: ACTIVE | Noted: 2021-04-15

## 2021-08-15 PROBLEM — M47.812 CERVICAL SPONDYLOSIS: Status: ACTIVE | Noted: 2018-11-27

## 2021-08-15 PROBLEM — M47.816 LUMBAR SPONDYLOSIS: Status: ACTIVE | Noted: 2018-11-27

## 2021-08-15 PROBLEM — E78.5 HYPERLIPEMIA: Status: ACTIVE | Noted: 2021-08-15

## 2021-08-15 PROBLEM — Z72.0 NICOTINE ABUSE: Status: ACTIVE | Noted: 2020-08-05

## 2021-08-15 PROBLEM — K21.9 ESOPHAGEAL REFLUX: Status: ACTIVE | Noted: 2021-08-15

## 2021-08-15 PROBLEM — N63.0 BREAST LUMP: Status: ACTIVE | Noted: 2021-08-15

## 2021-08-15 PROBLEM — J43.9 PULMONARY EMPHYSEMA (HCC): Status: ACTIVE | Noted: 2021-04-15

## 2021-08-15 PROBLEM — I10 HIGH BLOOD PRESSURE: Status: ACTIVE | Noted: 2021-08-15

## 2021-08-15 PROBLEM — M79.7 FIBROMYALGIA: Status: ACTIVE | Noted: 2020-03-14

## 2021-08-16 ENCOUNTER — OFFICE VISIT (OUTPATIENT)
Dept: FAMILY MEDICINE CLINIC | Facility: CLINIC | Age: 56
End: 2021-08-16

## 2021-08-16 ENCOUNTER — REFERRAL TRIAGE (OUTPATIENT)
Dept: CASE MANAGEMENT | Facility: OTHER | Age: 56
End: 2021-08-16

## 2021-08-16 VITALS
HEIGHT: 65 IN | SYSTOLIC BLOOD PRESSURE: 105 MMHG | BODY MASS INDEX: 37.49 KG/M2 | WEIGHT: 225 LBS | DIASTOLIC BLOOD PRESSURE: 75 MMHG | HEART RATE: 102 BPM | OXYGEN SATURATION: 94 %

## 2021-08-16 DIAGNOSIS — R39.9 UTI SYMPTOMS: Primary | ICD-10-CM

## 2021-08-16 DIAGNOSIS — K21.00 GASTROESOPHAGEAL REFLUX DISEASE WITH ESOPHAGITIS WITHOUT HEMORRHAGE: ICD-10-CM

## 2021-08-16 DIAGNOSIS — E78.00 PURE HYPERCHOLESTEROLEMIA: ICD-10-CM

## 2021-08-16 DIAGNOSIS — Z71.6 ENCOUNTER FOR SMOKING CESSATION COUNSELING: ICD-10-CM

## 2021-08-16 DIAGNOSIS — I10 ESSENTIAL HYPERTENSION: Chronic | ICD-10-CM

## 2021-08-16 DIAGNOSIS — I21.9 MYOCARDIAL INFARCTION, UNSPECIFIED MI TYPE, UNSPECIFIED ARTERY (HCC): ICD-10-CM

## 2021-08-16 DIAGNOSIS — B37.9 CANDIDIASIS: ICD-10-CM

## 2021-08-16 DIAGNOSIS — R10.9 RIGHT FLANK PAIN: ICD-10-CM

## 2021-08-16 DIAGNOSIS — F17.200 NICOTINE DEPENDENCE WITH CURRENT USE: Chronic | ICD-10-CM

## 2021-08-16 DIAGNOSIS — I25.10 ARTERIOSCLEROTIC CARDIOVASCULAR DISEASE (ASCVD): ICD-10-CM

## 2021-08-16 DIAGNOSIS — Z72.0 TOBACCO USE: ICD-10-CM

## 2021-08-16 DIAGNOSIS — R31.9 HEMATURIA, UNSPECIFIED TYPE: ICD-10-CM

## 2021-08-16 LAB
BILIRUB BLD-MCNC: ABNORMAL MG/DL
CLARITY, POC: CLEAR
COLOR UR: YELLOW
GLUCOSE UR STRIP-MCNC: NEGATIVE MG/DL
KETONES UR QL: ABNORMAL
LEUKOCYTE EST, POC: NEGATIVE
NITRITE UR-MCNC: NEGATIVE MG/ML
PH UR: 5.5 [PH] (ref 5–8)
PROT UR STRIP-MCNC: NEGATIVE MG/DL
RBC # UR STRIP: ABNORMAL /UL
SP GR UR: 1.02 (ref 1–1.03)
UROBILINOGEN UR QL: NORMAL

## 2021-08-16 PROCEDURE — 99214 OFFICE O/P EST MOD 30 MIN: CPT | Performed by: PHYSICIAN ASSISTANT

## 2021-08-16 PROCEDURE — 87086 URINE CULTURE/COLONY COUNT: CPT | Performed by: PHYSICIAN ASSISTANT

## 2021-08-16 RX ORDER — NICOTINE 21 MG/24HR
1 PATCH, TRANSDERMAL 24 HOURS TRANSDERMAL EVERY 24 HOURS
Qty: 14 EACH | Refills: 0 | Status: ON HOLD | OUTPATIENT
Start: 2021-08-16 | End: 2022-08-26

## 2021-08-16 RX ORDER — LOSARTAN POTASSIUM 100 MG/1
100 TABLET ORAL DAILY
Qty: 90 TABLET | Refills: 1 | Status: SHIPPED | OUTPATIENT
Start: 2021-08-16 | End: 2021-08-24

## 2021-08-16 RX ORDER — LORATADINE 10 MG/1
CAPSULE, LIQUID FILLED ORAL
COMMUNITY
End: 2021-10-18

## 2021-08-16 RX ORDER — METAXALONE 800 MG/1
800 TABLET ORAL 3 TIMES DAILY PRN
COMMUNITY
End: 2021-10-18

## 2021-08-16 RX ORDER — OXYCODONE HYDROCHLORIDE 15 MG/1
15 TABLET, FILM COATED, EXTENDED RELEASE ORAL EVERY 12 HOURS SCHEDULED
COMMUNITY

## 2021-08-16 RX ORDER — FLUCONAZOLE 150 MG/1
150 TABLET ORAL ONCE
Qty: 1 TABLET | Refills: 0 | Status: SHIPPED | OUTPATIENT
Start: 2021-08-16 | End: 2021-08-16

## 2021-08-16 RX ORDER — PANTOPRAZOLE SODIUM 40 MG/1
40 TABLET, DELAYED RELEASE ORAL DAILY
COMMUNITY
End: 2021-12-30 | Stop reason: SDUPTHER

## 2021-08-16 NOTE — PATIENT INSTRUCTIONS
Steps to Quit Smoking  Smoking tobacco is the leading cause of preventable death. It can affect almost every organ in the body. Smoking puts you and those around you at risk for developing many serious chronic diseases. Quitting smoking can be difficult, but it is one of the best things that you can do for your health. It is never too late to quit.  How do I get ready to quit?  When you decide to quit smoking, create a plan to help you succeed. Before you quit:  · Pick a date to quit. Set a date within the next 2 weeks to give you time to prepare.  · Write down the reasons why you are quitting. Keep this list in places where you will see it often.  · Tell your family, friends, and co-workers that you are quitting. Support from your loved ones can make quitting easier.  · Talk with your health care provider about your options for quitting smoking.  · Find out what treatment options are covered by your health insurance.  · Identify people, places, things, and activities that make you want to smoke (triggers). Avoid them.  What first steps can I take to quit smoking?  · Throw away all cigarettes at home, at work, and in your car.  · Throw away smoking accessories, such as ashtrays and lighters.  · Clean your car. Make sure to empty the ashtray.  · Clean your home, including curtains and carpets.  What strategies can I use to quit smoking?  Talk with your health care provider about combining strategies, such as taking medicines while you are also receiving in-person counseling. Using these two strategies together makes you more likely to succeed in quitting than if you used either strategy on its own.  · If you are pregnant or breastfeeding, talk with your health care provider about finding counseling or other support strategies to quit smoking. Do not take medicine to help you quit smoking unless your health care provider tells you to do so.  To quit smoking:  Quit right away  · Quit smoking completely, instead of  gradually reducing how much you smoke over a period of time. Research shows that stopping smoking right away is more successful than gradually quitting.  · Attend in-person counseling to help you build problem-solving skills. You are more likely to succeed in quitting if you attend counseling sessions regularly. Even short sessions of 10 minutes can be effective.  Take medicine  You may take medicines to help you quit smoking. Some medicines require a prescription and some you can purchase over-the-counter. Medicines may have nicotine in them to replace the nicotine in cigarettes. Medicines may:  · Help to stop cravings.  · Help to relieve withdrawal symptoms.  Your health care provider may recommend:  · Nicotine patches, gum, or lozenges.  · Nicotine inhalers or sprays.  · Non-nicotine medicine that is taken by mouth.  Find resources  Find resources and support systems that can help you to quit smoking and remain smoke-free after you quit. These resources are most helpful when you use them often. They include:  · Online chats with a counselor.  · Telephone quitlines.  · Printed self-help materials.  · Support groups or group counseling.  · Text messaging programs.  · Mobile phone apps or applications. Use apps that can help you stick to your quit plan by providing reminders, tips, and encouragement. There are many free apps for mobile devices as well as websites. Examples include Quit Guide from the CDC and smokefree.gov  What things can I do to make it easier to quit?    · Reach out to your family and friends for support and encouragement. Call telephone quitlines (6-918-QUIT-NOW), reach out to support groups, or work with a counselor for support.  · Ask people who smoke to avoid smoking around you.  · Avoid places that trigger you to smoke, such as bars, parties, or smoke-break areas at work.  · Spend time with people who do not smoke.  · Lessen the stress in your life. Stress can be a smoking trigger for some  people. To lessen stress, try:  ? Exercising regularly.  ? Doing deep-breathing exercises.  ? Doing yoga.  ? Meditating.  ? Performing a body scan. This involves closing your eyes, scanning your body from head to toe, and noticing which parts of your body are particularly tense. Try to relax the muscles in those areas.  How will I feel when I quit smoking?  Day 1 to 3 weeks  Within the first 24 hours of quitting smoking, you may start to feel withdrawal symptoms. These symptoms are usually most noticeable 2-3 days after quitting, but they usually do not last for more than 2-3 weeks. You may experience these symptoms:  · Mood swings.  · Restlessness, anxiety, or irritability.  · Trouble concentrating.  · Dizziness.  · Strong cravings for sugary foods and nicotine.  · Mild weight gain.  · Constipation.  · Nausea.  · Coughing or a sore throat.  · Changes in how the medicines that you take for unrelated issues work in your body.  · Depression.  · Trouble sleeping (insomnia).  Week 3 and afterward  After the first 2-3 weeks of quitting, you may start to notice more positive results, such as:  · Improved sense of smell and taste.  · Decreased coughing and sore throat.  · Slower heart rate.  · Lower blood pressure.  · Clearer skin.  · The ability to breathe more easily.  · Fewer sick days.  Quitting smoking can be very challenging. Do not get discouraged if you are not successful the first time. Some people need to make many attempts to quit before they achieve long-term success. Do your best to stick to your quit plan, and talk with your health care provider if you have any questions or concerns.  Summary  · Smoking tobacco is the leading cause of preventable death. Quitting smoking is one of the best things that you can do for your health.  · When you decide to quit smoking, create a plan to help you succeed.  · Quit smoking right away, not slowly over a period of time.  · When you start quitting, seek help from your  health care provider, family, or friends.  This information is not intended to replace advice given to you by your health care provider. Make sure you discuss any questions you have with your health care provider.  Document Revised: 09/11/2020 Document Reviewed: 03/07/2020  Elsevier Patient Education © 2021 [employer]

## 2021-08-16 NOTE — PROGRESS NOTES
"Chief Complaint  Hypertension (4 month follow up) and Hyperlipidemia    Subjective          Lindsey Mckinnon presents to [facility]  History of Present Illness    Lindsey Mckinnon is a 56 y.o. female who presents today for a 4 month follow up HTN, HLD    Pt c/o UTI symptoms, she states she has been to urgent care 3 times in the past month for UTI and Bronchitis. Pt was given Cipro, Pyridium, Prednisone and Z Pack with no relief. Pt did have urine culture 8/12/21 which was normal. Pt notes she is having a lot of lower abdominal pain, burning with urination, and urgency.     Pt states she also has yeast infection from antibiotics, she has used monistat.     Labs-8/2021  Mammo- scheduled for 8/30/21  Colonoscopy- 2019    Pt refuses COVID-19 vaccine.     Objective   Vital Signs:   /75 (BP Location: Left arm)   Pulse 102   Ht 165.1 cm (65\")   Wt 102 kg (225 lb)   SpO2 94%   BMI 37.44 kg/m²     Physical Exam  Vitals and nursing note reviewed.   Constitutional:       Appearance: Normal appearance. She is obese.   HENT:      Head: Normocephalic and atraumatic.   Cardiovascular:      Rate and Rhythm: Normal rate and regular rhythm.   Pulmonary:      Effort: Pulmonary effort is normal.      Breath sounds: Normal breath sounds.   Abdominal:      General: Abdomen is flat. Bowel sounds are normal.      Palpations: Abdomen is soft.      Tenderness: There is abdominal tenderness. There is right CVA tenderness.   Musculoskeletal:      Cervical back: Neck supple.      Comments: Right flank pain   Neurological:      Mental Status: She is alert.   Psychiatric:         Mood and Affect: Mood normal.         Behavior: Behavior normal.        Result Review :     Common labs    Common Labsle 3/2/21 8/12/21 8/12/21 8/12/21 8/12/21     0952 0952 0952 0952   Glucose    116 (A)    BUN    9    Creatinine    0.71    eGFR Non African Am    85    Sodium    139    Potassium    4.0    Chloride "    106    Calcium    9.1    Albumin    3.80    Total Bilirubin    0.3    Alkaline Phosphatase    75    AST (SGOT)    12    ALT (SGPT)    17    WBC 13.18 (A) 12.05 (A)      Hemoglobin 14.4 14.8      Hematocrit 45.0 46.4      Platelets 299 305      Total Cholesterol   212 (A)     Triglycerides   136     HDL Cholesterol   44     LDL Cholesterol    143 (A)     Hemoglobin A1C     5.80 (A)   (A) Abnormal value                        Assessment and Plan      Diagnoses and all orders for this visit:    1. UTI symptoms (Primary)  -     POCT urinalysis dipstick, automated    2. Hematuria, unspecified type  -     Urine Culture - Urine, Urine, Clean Catch; Future  -     Ambulatory Referral to Urology  -     CT Abdomen Pelvis Stone Protocol; Future  -     Ambulatory Referral to Chronic Care Management  -     Urine Culture - Urine, Urine, Clean Catch    3. Right flank pain  -     CT Abdomen Pelvis Stone Protocol; Future    4. Candidiasis  -     fluconazole (Diflucan) 150 MG tablet; Take 1 tablet by mouth 1 (One) Time for 1 dose.  Dispense: 1 tablet; Refill: 0    5. Nicotine dependence with current use  Comments:  Poor control, cont to smoke  Orders:  -     nicotine (NICODERM CQ) 14 MG/24HR patch; Place 1 patch on the skin as directed by provider Daily.  Dispense: 14 each; Refill: 0  -     nicotine (EQ Nicotine) 7 MG/24HR patch; Place 1 patch on the skin as directed by provider Daily.  Dispense: 30 each; Refill: 2  -     Ambulatory Referral to Chronic Care Management    6. Encounter for smoking cessation counseling  -     nicotine (NICODERM CQ) 14 MG/24HR patch; Place 1 patch on the skin as directed by provider Daily.  Dispense: 14 each; Refill: 0  -     nicotine (EQ Nicotine) 7 MG/24HR patch; Place 1 patch on the skin as directed by provider Daily.  Dispense: 30 each; Refill: 2  -     nicotine (NICODERM CQ) 21 MG/24HR patch; Place 1 patch on the skin as directed by provider Daily.  Dispense: 14 each; Refill: 0    7. Tobacco  use    8. Arteriosclerotic cardiovascular disease (ASCVD)  -     Ambulatory Referral to Chronic Care Management    9. Essential hypertension  Comments:  Controlled with lisinopril 30mg but we will switch to cozaar 100mg daily  Orders:  -     Ambulatory Referral to Chronic Care Management  -     losartan (Cozaar) 100 MG tablet; Take 1 tablet by mouth Daily.  Dispense: 90 tablet; Refill: 1    10. Pure hypercholesterolemia  -     Ambulatory Referral to Chronic Care Management    11. Myocardial infarction, unspecified MI type, unspecified artery (CMS/HCC)  -     Ambulatory Referral to Chronic Care Management    12. Gastroesophageal reflux disease with esophagitis without hemorrhage  -     Ambulatory Referral to Chronic Care Management       Spent 5 minutes discussion smoking cessation, including the proper use of nicoderm patches.    Follow Up     Return in about 2 months (around 10/16/2021).    I have reviewed information obtained and documented by others and I have confirmed the accuracy of this documented note.     Patient was given instructions and counseling regarding her condition or for health maintenance advice. Please see specific information pulled into the AVS if appropriate.     BULL Langford

## 2021-08-17 ENCOUNTER — APPOINTMENT (OUTPATIENT)
Dept: CT IMAGING | Facility: HOSPITAL | Age: 56
End: 2021-08-17

## 2021-08-17 ENCOUNTER — HOSPITAL ENCOUNTER (OUTPATIENT)
Dept: CT IMAGING | Facility: HOSPITAL | Age: 56
Discharge: HOME | End: 2021-08-17
Admitting: PHYSICIAN ASSISTANT

## 2021-08-17 DIAGNOSIS — R31.9 HEMATURIA, UNSPECIFIED TYPE: ICD-10-CM

## 2021-08-17 DIAGNOSIS — R10.9 RIGHT FLANK PAIN: ICD-10-CM

## 2021-08-17 LAB — BACTERIA SPEC AEROBE CULT: NO GROWTH

## 2021-08-17 PROCEDURE — 74176 CT ABD & PELVIS W/O CONTRAST: CPT

## 2021-08-18 ENCOUNTER — TELEPHONE (OUTPATIENT)
Dept: SURGERY | Facility: CLINIC | Age: 56
End: 2021-08-18

## 2021-08-20 ENCOUNTER — APPOINTMENT (OUTPATIENT)
Dept: MAMMOGRAPHY | Facility: HOSPITAL | Age: 56
End: 2021-08-20

## 2021-08-26 ENCOUNTER — OFFICE VISIT (OUTPATIENT)
Dept: UROLOGY | Facility: CLINIC | Age: 56
End: 2021-08-26

## 2021-08-26 VITALS — BODY MASS INDEX: 37.49 KG/M2 | WEIGHT: 225 LBS | RESPIRATION RATE: 16 BRPM | HEIGHT: 65 IN

## 2021-08-26 DIAGNOSIS — R31.0 GROSS HEMATURIA: Primary | ICD-10-CM

## 2021-08-26 PROCEDURE — 52000 CYSTOURETHROSCOPY: CPT | Performed by: UROLOGY

## 2021-08-26 RX ORDER — HYDROCHLOROTHIAZIDE 25 MG/1
TABLET ORAL
COMMUNITY
Start: 2021-08-19 | End: 2021-10-18

## 2021-08-26 RX ORDER — FLUCONAZOLE 150 MG/1
150 TABLET ORAL ONCE
COMMUNITY
Start: 2021-08-16 | End: 2021-10-18

## 2021-08-26 RX ORDER — LORATADINE 10 MG/1
TABLET ORAL
COMMUNITY
Start: 2021-08-25 | End: 2022-02-11 | Stop reason: SDUPTHER

## 2021-08-26 NOTE — PROGRESS NOTES
Preprocedure diagnosis  Gross hematuria    Postprocedure diagnosis  Same as above    Procedure  Flexible Cystourethroscopy    Attending surgeon  Minnie Renteria MD    Anesthesia  2% lidocaine jelly intraurethrally    Complications  None    Indications  56 y.o. female undergoing a flexible cystoscopy for the above mentioned indications.  Seen in urgent care 3 times for gross hematuria; cultures were negative.  Patient states she was most concerned when she started having pain with associated hematuria.  CT scan without identifiable etiology for patient's hematuria.  Previously seen, 12/2020 for which cystoscopy was recommended and planned but was never performed.  Informed consent was obtained.      Findings   Cystoscopy revealed one right and left ureteral orifice in the normal anatomic position, normal bladder mucosa and no tumors, masses or stones.  No identifiable etiology for patient's reported hematuria.    Procedure  The patient was placed in supine position and prepped and draped in sterile fashion with lidocaine jelly per urethra for anesthesia.  A timeout was performed.  The 14F flexible cystoscope was lubricated and gently placed through the urethra and into the bladder.  The bladder was completely visualized.  The cystoscope was retroflexed and the bladder neck visualized. Findings were as above. The scope was withdrawn and the procedure terminated.  The patient tolerated the procedure well.        Plan:  Tolerated procedure well.  Instructions provided.  Cystoscopic findings discussed with patient include healthy appearing bladder mucosa; no identifiable etiology for patient's reported gross hematuria.  Recommend follow-up with urology Cesilia ALFRED, for further management discussion, hematuria work-up negative; consider recurrent cystitis with associated gross hematuria.  Recommend screening urinalysis with microscopy with consideration of repeating work-up if persistently positive after 5  years.  Patient to follow-up in 2-3 weeks with Cesilia  All questions addressed

## 2021-09-30 ENCOUNTER — OFFICE VISIT (OUTPATIENT)
Dept: UROLOGY | Facility: CLINIC | Age: 56
End: 2021-09-30

## 2021-09-30 VITALS — HEIGHT: 65 IN | RESPIRATION RATE: 16 BRPM | BODY MASS INDEX: 37.55 KG/M2 | WEIGHT: 225.4 LBS

## 2021-09-30 DIAGNOSIS — R31.1 BENIGN ESSENTIAL MICROSCOPIC HEMATURIA: Primary | ICD-10-CM

## 2021-09-30 DIAGNOSIS — R39.16 STRAINING TO VOID: ICD-10-CM

## 2021-09-30 LAB
BILIRUB BLD-MCNC: NEGATIVE MG/DL
CLARITY, POC: CLEAR
COLOR UR: YELLOW
GLUCOSE UR STRIP-MCNC: NEGATIVE MG/DL
KETONES UR QL: ABNORMAL
LEUKOCYTE EST, POC: ABNORMAL
NITRITE UR-MCNC: NEGATIVE MG/ML
PH UR: 6 [PH] (ref 5–8)
PROT UR STRIP-MCNC: NEGATIVE MG/DL
RBC # UR STRIP: ABNORMAL /UL
SP GR UR: 1.02 (ref 1–1.03)
URINE VOLUME: 12
UROBILINOGEN UR QL: NORMAL

## 2021-09-30 PROCEDURE — 51798 US URINE CAPACITY MEASURE: CPT | Performed by: NURSE PRACTITIONER

## 2021-09-30 PROCEDURE — 99213 OFFICE O/P EST LOW 20 MIN: CPT | Performed by: NURSE PRACTITIONER

## 2021-09-30 RX ORDER — TAMSULOSIN HYDROCHLORIDE 0.4 MG/1
1 CAPSULE ORAL DAILY
Qty: 90 CAPSULE | Refills: 3 | Status: SHIPPED | OUTPATIENT
Start: 2021-09-30 | End: 2022-09-25

## 2021-09-30 NOTE — PROGRESS NOTES
Chief Complaint: Blood in Urine (did a scope have trouble expressing urine, doesn't feel like she emptys)    Subjective         History of Present Illness  Lindsey Mckinnon is a 56 y.o. female presents to [facility] to be seen for f/u gross hematuria.    She reports that she has issues with emptying her bladder and outer vaginal pain and pressure.     She states that she has to strain to void most of the time.     Patient has had a hysterectomy previously.     She states that she was seen by OB/GYN previously with no cystocele noted.     She states that she is having issues with spinal stenosis and bulging discs with DDD and bone spurs with foraminal narrowing.     Objective     Past Medical History:   Diagnosis Date   • Anemia    • Anxiety    • Arthritis    • ASCVD (arteriosclerotic cardiovascular disease)    • Benign essential hypertension    • Breast lump    • Cervical spine pain    • Chest pain    • Chronic allergic rhinitis    • COPD (chronic obstructive pulmonary disease) (CMS/HCC) 04/15/2021   • Degenerative disc disease, cervical    • Depression    • Esophageal reflux    • Essential hypertension 04/15/2021   • Fibromyalgia    • Forgetfulness    • Gall stones    • GERD (gastroesophageal reflux disease)    • H/O emphysema    • Heart attack (CMS/HCC)    • Hemorrhoid    • Hernia cerebri (CMS/HCC)    • Hernia, hiatal    • Herniated disc, cervical    • High blood pressure    • High cholesterol    • Hyperlipemia    • Hyperlipidemia 04/15/2021   • Hypertension    • Kidney stone    • Limb swelling    • Lumbar pain    • Migraine    • Mood disorder (CMS/HCC)    • Muscle cramps    • Nicotine dependence 04/15/2021   • Reflux esophagitis    • Shortness of breath    • Spinal stenosis    • Tremor 04/15/2021       Past Surgical History:   Procedure Laterality Date   • CERVICAL EPIDURAL      STERIOD INJECTIONS    •  SECTION      X 2   •  SECTION  ,   • ENDOSCOPY     •  GALLBLADDER SURGERY  2020   • HYSTERECTOMY     • HYSTERECTOMY  2005   • LUMBAR EPIDURAL INJECTION      STERIOD         Current Outpatient Medications:   •  albuterol (ACCUNEB) 0.63 MG/3ML nebulizer solution, Take 3 mL by nebulization Every 6 (Six) Hours As Needed for Wheezing., Disp: 60 each, Rfl: 0  •  fluconazole (DIFLUCAN) 150 MG tablet, Take 150 mg by mouth 1 (One) Time., Disp: , Rfl:   •  Fluticasone Furoate-Vilanterol (Breo Ellipta) 200-25 MCG/INH inhaler, Inhale 1 puff Daily., Disp: 1 each, Rfl: 2  •  hydroCHLOROthiazide (HYDRODIURIL) 25 MG tablet, , Disp: , Rfl:   •  loratadine (CLARITIN) 10 MG tablet, , Disp: , Rfl:   •  Loratadine 10 MG capsule, Take  by mouth., Disp: , Rfl:   •  metaxalone (SKELAXIN) 800 MG tablet, Take 800 mg by mouth 3 (Three) Times a Day As Needed for Muscle Spasms., Disp: , Rfl:   •  nicotine (EQ Nicotine) 7 MG/24HR patch, Place 1 patch on the skin as directed by provider Daily., Disp: 30 each, Rfl: 2  •  nicotine (NICODERM CQ) 14 MG/24HR patch, Place 1 patch on the skin as directed by provider Daily., Disp: 14 each, Rfl: 0  •  nicotine (NICODERM CQ) 21 MG/24HR patch, Place 1 patch on the skin as directed by provider Daily., Disp: 14 each, Rfl: 0  •  oxyCODONE ER (OxyCONTIN) 15 MG tablet extended-release 12 hour, Take 15 mg by mouth Every 12 (Twelve) Hours., Disp: , Rfl:   •  pantoprazole (PROTONIX) 40 MG EC tablet, Take 40 mg by mouth Daily., Disp: , Rfl:   •  sertraline (ZOLOFT) 100 MG tablet, sertraline 100 mg oral tablet take 1 tablet (100 mg) by oral route once daily for 30 days 4/15/2021  Active, Disp: , Rfl:   •  tamsulosin (FLOMAX) 0.4 MG capsule 24 hr capsule, Take 1 capsule by mouth Daily for 360 days., Disp: 90 capsule, Rfl: 3  •  tiotropium (Spiriva HandiHaler) 18 MCG per inhalation capsule, Place 1 capsule into inhaler and inhale Daily., Disp: 30 capsule, Rfl: 5    Allergies   Allergen Reactions   • Morphine Itching   • Sulfa Antibiotics Nausea And Vomiting        Family  "History   Problem Relation Age of Onset   • Lung cancer Mother         MALIGNANT   • Diabetes Mother         UNSPECIFIED TYPE   • Cancer Mother         BLADDER   • Osteoporosis Mother    • Arthritis Mother    • Lung cancer Father         MALIGNANT   • Diabetes Father         UNSPECIFIED TYPE/ MELLITUS   • Heart attack Father    • Kidney cancer Father    • Cancer Father         BLADDER   • Arthritis Father    • Heart disease Father    • Diabetes Brother         UNSPECIFIED TYPE/MELLITUS   • Kidney cancer Brother    • Arthritis Brother    • Arthritis Son        Social History     Socioeconomic History   • Marital status:      Spouse name: Not on file   • Number of children: Not on file   • Years of education: Not on file   • Highest education level: Not on file   Tobacco Use   • Smoking status: Current Every Day Smoker     Packs/day: 0.50     Types: Cigarettes   • Smokeless tobacco: Never Used   • Tobacco comment: STARTED AT AGE 15   Vaping Use   • Vaping Use: Never used   Substance and Sexual Activity   • Alcohol use: Never   • Drug use: Never   • Sexual activity: Defer       Vital Signs:   Resp 16   Ht 165.1 cm (65\")   Wt 102 kg (225 lb 6.4 oz)   BMI 37.51 kg/m²      Physical Exam     Result Review :   The following data was reviewed by: SOFIA Dias on 09/30/2021:  Results for orders placed or performed in visit on 09/30/21   Bladder Scan   Result Value Ref Range    Urine Volume 12    POC Urinalysis Dipstick, Automated    Specimen: Urine   Result Value Ref Range    Color Yellow Yellow, Straw, Dark Yellow, Bernadine    Clarity, UA Clear Clear    Specific Gravity  1.020 1.005 - 1.030    pH, Urine 6.0 5.0 - 8.0    Leukocytes Trace (A) Negative    Nitrite, UA Negative Negative    Protein, POC Negative Negative mg/dL    Glucose, UA Negative Negative, 1000 mg/dL (3+) mg/dL    Ketones, UA Trace (A) Negative    Urobilinogen, UA Normal Normal    Bilirubin Negative Negative    Blood, UA Small (A) Negative    "    Bladder Scan interpretation 09/30/2021    Estimation of residual urine via BVI 3000 Verathon Bladder Scan  Residual Urine:12 ml  Indication: Benign essential microscopic hematuria    Straining to void   Position: Supine  Examination: Incremental scanning of the suprapubic area using 2.0 MHz transducer using copious amounts of acoustic gel.   Findings: An anechoic area was demonstrated which represented the bladder, with measurement of residual urine as noted. I inspected this myself. In that the residual urine was insignificant, refer to plan for treatment and plan necessary at this time.         Procedures        Assessment and Plan    Diagnoses and all orders for this visit:    1. Benign essential microscopic hematuria (Primary)  -     POC Urinalysis Dipstick, Automated  -     Bladder Scan    2. Straining to void  -     tamsulosin (FLOMAX) 0.4 MG capsule 24 hr capsule; Take 1 capsule by mouth Daily for 360 days.  Dispense: 90 capsule; Refill: 3      Discussed with patient that we can trial tamsulosin once again to see if this will help to alleviate her straining to void.  Did discuss with patient that her microscopic hematuria work-up was negative for any etiology specifically negative for any malignancies.        I spent 10 minutes caring for Lindsey on this date of service. This time includes time spent by me in the following activities:reviewing tests, obtaining and/or reviewing a separately obtained history, performing a medically appropriate examination and/or evaluation , counseling and educating the patient/family/caregiver, ordering medications, tests, or procedures, and documenting information in the medical record  Follow Up   Return in about 4 weeks (around 10/28/2021) for f/u tamsulosin .  Patient was given instructions and counseling regarding her condition or for health maintenance advice. Please see specific information pulled into the AVS if appropriate.         This document has been  electronically signed by Cesilia Delaney, SOFIA  September 30, 2021 15:53 EDT

## 2021-10-18 ENCOUNTER — OFFICE VISIT (OUTPATIENT)
Dept: FAMILY MEDICINE CLINIC | Facility: CLINIC | Age: 56
End: 2021-10-18

## 2021-10-18 VITALS
DIASTOLIC BLOOD PRESSURE: 76 MMHG | BODY MASS INDEX: 37.85 KG/M2 | HEIGHT: 65 IN | HEART RATE: 84 BPM | WEIGHT: 227.2 LBS | SYSTOLIC BLOOD PRESSURE: 117 MMHG | OXYGEN SATURATION: 96 %

## 2021-10-18 DIAGNOSIS — E66.01 CLASS 2 SEVERE OBESITY DUE TO EXCESS CALORIES WITH SERIOUS COMORBIDITY AND BODY MASS INDEX (BMI) OF 37.0 TO 37.9 IN ADULT (HCC): Chronic | ICD-10-CM

## 2021-10-18 DIAGNOSIS — M25.50 POLYARTHRALGIA: ICD-10-CM

## 2021-10-18 DIAGNOSIS — I10 ESSENTIAL HYPERTENSION: Primary | Chronic | ICD-10-CM

## 2021-10-18 DIAGNOSIS — Z12.31 ENCOUNTER FOR SCREENING MAMMOGRAM FOR MALIGNANT NEOPLASM OF BREAST: ICD-10-CM

## 2021-10-18 DIAGNOSIS — E55.9 VITAMIN D DEFICIENCY: ICD-10-CM

## 2021-10-18 DIAGNOSIS — J44.1 COPD WITH EXACERBATION (HCC): ICD-10-CM

## 2021-10-18 DIAGNOSIS — R53.83 OTHER FATIGUE: ICD-10-CM

## 2021-10-18 PROCEDURE — 99214 OFFICE O/P EST MOD 30 MIN: CPT | Performed by: PHYSICIAN ASSISTANT

## 2021-10-18 RX ORDER — LOSARTAN POTASSIUM 100 MG/1
TABLET ORAL
COMMUNITY
End: 2021-10-18 | Stop reason: SDUPTHER

## 2021-10-18 RX ORDER — ERGOCALCIFEROL 1.25 MG/1
50000 CAPSULE ORAL WEEKLY
Qty: 13 CAPSULE | Refills: 1 | Status: SHIPPED | OUTPATIENT
Start: 2021-10-18 | End: 2022-02-11 | Stop reason: SDUPTHER

## 2021-10-18 RX ORDER — LOSARTAN POTASSIUM 100 MG/1
100 TABLET ORAL DAILY
Qty: 90 TABLET | Refills: 1 | Status: SHIPPED | OUTPATIENT
Start: 2021-10-18 | End: 2022-02-11 | Stop reason: SDUPTHER

## 2021-10-18 NOTE — PROGRESS NOTES
Chief Complaint  Allergic Rhinitis (2 month follow up) and Heartburn    Subjective          Lindsey Mckinnon presents to [facility]  History of Present Illness  Pt presents today for 2 month follow up.    Pt states she is still having the rt flank pain. Pt states Sharon is schdling an MRI due to a knot on her spine. Pt states she believes her spine issues is the cause of her flank pain.     Pt states she had f/u last Thursday 10/14. Pt states her pain level stays at at a 6/10.    Pt states she was seen by Cesilia Delaney for the blood in her urine. Pt states she was given flomax and is helping.  Pt states shehad a scope and was told it was normal.    Labs 8/2021  Flu refused      Current Outpatient Medications:   •  albuterol (ACCUNEB) 0.63 MG/3ML nebulizer solution, Take 3 mL by nebulization Every 6 (Six) Hours As Needed for Wheezing., Disp: 60 each, Rfl: 0  •  Fluticasone Furoate-Vilanterol (Breo Ellipta) 200-25 MCG/INH inhaler, Inhale 1 puff Daily., Disp: 1 each, Rfl: 2  •  loratadine (CLARITIN) 10 MG tablet, , Disp: , Rfl:   •  losartan (COZAAR) 100 MG tablet, Take 1 tablet by mouth Daily., Disp: 90 tablet, Rfl: 1  •  oxyCODONE ER (OxyCONTIN) 15 MG tablet extended-release 12 hour, Take 15 mg by mouth Every 12 (Twelve) Hours., Disp: , Rfl:   •  pantoprazole (PROTONIX) 40 MG EC tablet, Take 40 mg by mouth Daily., Disp: , Rfl:   •  sertraline (ZOLOFT) 100 MG tablet, sertraline 100 mg oral tablet take 1 tablet (100 mg) by oral route once daily for 30 days 4/15/2021  Active, Disp: , Rfl:   •  tamsulosin (FLOMAX) 0.4 MG capsule 24 hr capsule, Take 1 capsule by mouth Daily for 360 days., Disp: 90 capsule, Rfl: 3  •  tiotropium (Spiriva HandiHaler) 18 MCG per inhalation capsule, Place 1 capsule into inhaler and inhale Daily., Disp: 30 capsule, Rfl: 5  •  nicotine (EQ Nicotine) 7 MG/24HR patch, Place 1 patch on the skin as directed by provider Daily., Disp: 30 each, Rfl: 2  •   "nicotine (NICODERM CQ) 14 MG/24HR patch, Place 1 patch on the skin as directed by provider Daily., Disp: 14 each, Rfl: 0  •  nicotine (NICODERM CQ) 21 MG/24HR patch, Place 1 patch on the skin as directed by provider Daily., Disp: 14 each, Rfl: 0  •  vitamin D (ERGOCALCIFEROL) 1.25 MG (02193 UT) capsule capsule, Take 1 capsule by mouth 1 (One) Time Per Week., Disp: 13 capsule, Rfl: 1    Objective      Vital Signs:   /76 (BP Location: Right arm)   Pulse 84   Ht 165.1 cm (65\")   Wt 103 kg (227 lb 3.2 oz)   SpO2 96%   BMI 37.81 kg/m²    Estimated body mass index is 37.81 kg/m² as calculated from the following:    Height as of this encounter: 165.1 cm (65\").    Weight as of this encounter: 103 kg (227 lb 3.2 oz).     Physical Exam  Vitals and nursing note reviewed.   Constitutional:       Appearance: Normal appearance. She is obese.   HENT:      Head: Normocephalic and atraumatic.   Cardiovascular:      Rate and Rhythm: Normal rate and regular rhythm.   Pulmonary:      Effort: Pulmonary effort is normal.      Breath sounds: Normal breath sounds.   Musculoskeletal:      Cervical back: Neck supple.   Neurological:      Mental Status: She is alert.   Psychiatric:         Mood and Affect: Mood normal.         Behavior: Behavior normal.        Result Review :     Common labs    Common Labsle 3/2/21 8/12/21 8/12/21 8/12/21 8/12/21     0952 0952 0952 0952   Glucose    116 (A)    BUN    9    Creatinine    0.71    eGFR Non African Am    85    Sodium    139    Potassium    4.0    Chloride    106    Calcium    9.1    Albumin    3.80    Total Bilirubin    0.3    Alkaline Phosphatase    75    AST (SGOT)    12    ALT (SGPT)    17    WBC 13.18 (A) 12.05 (A)      Hemoglobin 14.4 14.8      Hematocrit 45.0 46.4      Platelets 299 305      Total Cholesterol   212 (A)     Triglycerides   136     HDL Cholesterol   44     LDL Cholesterol    143 (A)     Hemoglobin A1C     5.80 (A)   (A) Abnormal value                        "     Assessment and Plan      Diagnoses and all orders for this visit:    1. Essential hypertension (Primary)  Comments:  Stable on coozar 100mg daily  Orders:  -     losartan (COZAAR) 100 MG tablet; Take 1 tablet by mouth Daily.  Dispense: 90 tablet; Refill: 1    2. COPD with exacerbation (HCC)  -     Fluticasone Furoate-Vilanterol (Breo Ellipta) 200-25 MCG/INH inhaler; Inhale 1 puff Daily.  Dispense: 1 each; Refill: 2  -     tiotropium (Spiriva HandiHaler) 18 MCG per inhalation capsule; Place 1 capsule into inhaler and inhale Daily.  Dispense: 30 capsule; Refill: 5    3. Class 2 severe obesity due to excess calories with serious comorbidity and body mass index (BMI) of 37.0 to 37.9 in adult (HCC)  Comments:  Disc diet and exercise    4. Polyarthralgia  -     DEBORAH Direct Reflex to 11 Biomarker; Future  -     Cyclic citrul peptide antibody, IgG/IgA; Future  -     CK; Future  -     C-reactive protein; Future  -     Rheumatoid factor; Future  -     Sedimentation rate; Future    5. Other fatigue  -     Vitamin B12 & Folate; Future  -     Iron level; Future    6. Vitamin D deficiency  -     vitamin D (ERGOCALCIFEROL) 1.25 MG (22892 UT) capsule capsule; Take 1 capsule by mouth 1 (One) Time Per Week.  Dispense: 13 capsule; Refill: 1    7. Encounter for screening mammogram for malignant neoplasm of breast  -     Mammo Screening Bilateral With CAD; Future       Follow Up     Return in about 3 months (around 1/18/2022).    I have reviewed information obtained and documented by others and I have confirmed the accuracy of this documented note.     Patient was given instructions and counseling regarding her condition or for health maintenance advice. Please see specific information pulled into the AVS if appropriate.     BULL Langford

## 2021-10-25 ENCOUNTER — TRANSCRIBE ORDERS (OUTPATIENT)
Dept: ADMINISTRATIVE | Facility: HOSPITAL | Age: 56
End: 2021-10-25

## 2021-10-25 DIAGNOSIS — M51.36 DEGENERATION OF LUMBAR INTERVERTEBRAL DISC: Primary | ICD-10-CM

## 2021-11-04 ENCOUNTER — LAB (OUTPATIENT)
Dept: LAB | Facility: HOSPITAL | Age: 56
End: 2021-11-04

## 2021-11-04 DIAGNOSIS — M25.50 POLYARTHRALGIA: ICD-10-CM

## 2021-11-04 DIAGNOSIS — R53.83 OTHER FATIGUE: ICD-10-CM

## 2021-11-04 LAB
CHROMATIN AB SERPL-ACNC: <10 IU/ML (ref 0–14)
CK SERPL-CCNC: 51 U/L (ref 20–180)
CRP SERPL-MCNC: 0.55 MG/DL (ref 0–0.5)
ERYTHROCYTE [SEDIMENTATION RATE] IN BLOOD: 29 MM/HR (ref 0–30)
FOLATE SERPL-MCNC: 8.87 NG/ML (ref 4.78–24.2)
IRON 24H UR-MRATE: 84 MCG/DL (ref 37–145)
VIT B12 BLD-MCNC: 269 PG/ML (ref 211–946)

## 2021-11-04 PROCEDURE — 82607 VITAMIN B-12: CPT

## 2021-11-04 PROCEDURE — 36415 COLL VENOUS BLD VENIPUNCTURE: CPT

## 2021-11-04 PROCEDURE — 83540 ASSAY OF IRON: CPT

## 2021-11-04 PROCEDURE — 86038 ANTINUCLEAR ANTIBODIES: CPT

## 2021-11-04 PROCEDURE — 86140 C-REACTIVE PROTEIN: CPT

## 2021-11-04 PROCEDURE — 82746 ASSAY OF FOLIC ACID SERUM: CPT

## 2021-11-04 PROCEDURE — 82550 ASSAY OF CK (CPK): CPT

## 2021-11-04 PROCEDURE — 86200 CCP ANTIBODY: CPT

## 2021-11-04 PROCEDURE — 85652 RBC SED RATE AUTOMATED: CPT

## 2021-11-04 PROCEDURE — 86431 RHEUMATOID FACTOR QUANT: CPT

## 2021-11-06 LAB — ANA SER QL: NEGATIVE

## 2021-11-07 LAB — CCP IGA+IGG SERPL IA-ACNC: 8 UNITS (ref 0–19)

## 2021-11-08 ENCOUNTER — TELEPHONE (OUTPATIENT)
Dept: FAMILY MEDICINE CLINIC | Facility: CLINIC | Age: 56
End: 2021-11-08

## 2021-11-09 ENCOUNTER — HOSPITAL ENCOUNTER (OUTPATIENT)
Dept: MRI IMAGING | Facility: HOSPITAL | Age: 56
Discharge: HOME | End: 2021-11-09
Admitting: PHYSICIAN ASSISTANT

## 2021-11-09 ENCOUNTER — TELEPHONE (OUTPATIENT)
Dept: UROLOGY | Facility: CLINIC | Age: 56
End: 2021-11-09

## 2021-11-09 DIAGNOSIS — M51.36 DEGENERATION OF LUMBAR INTERVERTEBRAL DISC: ICD-10-CM

## 2021-11-09 PROCEDURE — 72148 MRI LUMBAR SPINE W/O DYE: CPT

## 2021-11-09 NOTE — TELEPHONE ENCOUNTER
Please call pt to see if she wants to reschedule appt she cancelled 10/29/21 with Cesilia for hematuria and straining to urinate follow up.

## 2021-11-10 ENCOUNTER — APPOINTMENT (OUTPATIENT)
Dept: MAMMOGRAPHY | Facility: HOSPITAL | Age: 56
End: 2021-11-10

## 2021-12-30 ENCOUNTER — TELEPHONE (OUTPATIENT)
Dept: FAMILY MEDICINE CLINIC | Facility: CLINIC | Age: 56
End: 2021-12-30

## 2021-12-30 DIAGNOSIS — F41.9 ANXIETY: ICD-10-CM

## 2021-12-30 DIAGNOSIS — K21.00 GASTROESOPHAGEAL REFLUX DISEASE WITH ESOPHAGITIS WITHOUT HEMORRHAGE: Primary | ICD-10-CM

## 2021-12-30 RX ORDER — SERTRALINE HYDROCHLORIDE 100 MG/1
100 TABLET, FILM COATED ORAL DAILY
Qty: 30 TABLET | Refills: 3 | Status: SHIPPED | OUTPATIENT
Start: 2021-12-30 | End: 2022-02-11 | Stop reason: SDUPTHER

## 2021-12-30 RX ORDER — PANTOPRAZOLE SODIUM 40 MG/1
40 TABLET, DELAYED RELEASE ORAL DAILY
Qty: 30 TABLET | Refills: 3 | Status: SHIPPED | OUTPATIENT
Start: 2021-12-30 | End: 2022-02-11 | Stop reason: SDUPTHER

## 2021-12-30 NOTE — TELEPHONE ENCOUNTER
MADE A APPOINTMENT FOR MEDICATION REFILLS, WITH SOFIA MCITNOSH , NO SOONER APPOINTMENT WERE AVAILABLE WITH PCP, RESCHEDULED, 3MONTHS F/U WITH PCP  Andrew Goodman PA , 03/14/2022

## 2022-02-11 ENCOUNTER — OFFICE VISIT (OUTPATIENT)
Dept: FAMILY MEDICINE CLINIC | Facility: CLINIC | Age: 57
End: 2022-02-11

## 2022-02-11 VITALS
HEIGHT: 65 IN | WEIGHT: 228 LBS | BODY MASS INDEX: 37.99 KG/M2 | HEART RATE: 89 BPM | OXYGEN SATURATION: 93 % | DIASTOLIC BLOOD PRESSURE: 84 MMHG | SYSTOLIC BLOOD PRESSURE: 123 MMHG

## 2022-02-11 DIAGNOSIS — Z13.29 SCREENING FOR THYROID DISORDER: ICD-10-CM

## 2022-02-11 DIAGNOSIS — K21.00 GASTROESOPHAGEAL REFLUX DISEASE WITH ESOPHAGITIS WITHOUT HEMORRHAGE: Chronic | ICD-10-CM

## 2022-02-11 DIAGNOSIS — E78.00 PURE HYPERCHOLESTEROLEMIA: ICD-10-CM

## 2022-02-11 DIAGNOSIS — R39.9 UTI SYMPTOMS: Primary | ICD-10-CM

## 2022-02-11 DIAGNOSIS — R31.9 HEMATURIA, UNSPECIFIED TYPE: ICD-10-CM

## 2022-02-11 DIAGNOSIS — I10 ESSENTIAL HYPERTENSION: Chronic | ICD-10-CM

## 2022-02-11 DIAGNOSIS — I10 HYPERTENSION, UNSPECIFIED TYPE: ICD-10-CM

## 2022-02-11 DIAGNOSIS — J30.9 ALLERGIC RHINITIS, UNSPECIFIED SEASONALITY, UNSPECIFIED TRIGGER: ICD-10-CM

## 2022-02-11 DIAGNOSIS — J44.1 COPD WITH EXACERBATION: ICD-10-CM

## 2022-02-11 DIAGNOSIS — E66.01 CLASS 2 SEVERE OBESITY DUE TO EXCESS CALORIES WITH SERIOUS COMORBIDITY AND BODY MASS INDEX (BMI) OF 37.0 TO 37.9 IN ADULT: Chronic | ICD-10-CM

## 2022-02-11 DIAGNOSIS — F41.9 ANXIETY: ICD-10-CM

## 2022-02-11 DIAGNOSIS — E55.9 VITAMIN D DEFICIENCY: ICD-10-CM

## 2022-02-11 DIAGNOSIS — Z72.0 TOBACCO USE: ICD-10-CM

## 2022-02-11 PROBLEM — E66.812 CLASS 2 SEVERE OBESITY DUE TO EXCESS CALORIES WITH SERIOUS COMORBIDITY AND BODY MASS INDEX (BMI) OF 37.0 TO 37.9 IN ADULT: Chronic | Status: ACTIVE | Noted: 2022-02-11

## 2022-02-11 LAB
BILIRUB BLD-MCNC: NEGATIVE MG/DL
CLARITY, POC: CLEAR
COLOR UR: YELLOW
EXPIRATION DATE: ABNORMAL
GLUCOSE UR STRIP-MCNC: NEGATIVE MG/DL
KETONES UR QL: NEGATIVE
LEUKOCYTE EST, POC: NEGATIVE
Lab: ABNORMAL
NITRITE UR-MCNC: NEGATIVE MG/ML
PH UR: 6.5 [PH] (ref 5–8)
PROT UR STRIP-MCNC: NEGATIVE MG/DL
RBC # UR STRIP: ABNORMAL /UL
SP GR UR: 1.02 (ref 1–1.03)
UROBILINOGEN UR QL: NORMAL

## 2022-02-11 PROCEDURE — 87086 URINE CULTURE/COLONY COUNT: CPT | Performed by: PHYSICIAN ASSISTANT

## 2022-02-11 PROCEDURE — 99214 OFFICE O/P EST MOD 30 MIN: CPT | Performed by: PHYSICIAN ASSISTANT

## 2022-02-11 RX ORDER — ERGOCALCIFEROL 1.25 MG/1
50000 CAPSULE ORAL WEEKLY
Qty: 13 CAPSULE | Refills: 1 | Status: SHIPPED | OUTPATIENT
Start: 2022-02-11 | End: 2022-05-06 | Stop reason: SDUPTHER

## 2022-02-11 RX ORDER — SERTRALINE HYDROCHLORIDE 100 MG/1
100 TABLET, FILM COATED ORAL DAILY
Qty: 30 TABLET | Refills: 3 | Status: SHIPPED | OUTPATIENT
Start: 2022-02-11 | End: 2022-06-10

## 2022-02-11 RX ORDER — PANTOPRAZOLE SODIUM 40 MG/1
40 TABLET, DELAYED RELEASE ORAL DAILY
Qty: 30 TABLET | Refills: 3 | Status: SHIPPED | OUTPATIENT
Start: 2022-02-11 | End: 2022-11-30 | Stop reason: SDUPTHER

## 2022-02-11 RX ORDER — LOSARTAN POTASSIUM 100 MG/1
100 TABLET ORAL DAILY
Qty: 90 TABLET | Refills: 1 | Status: ON HOLD | OUTPATIENT
Start: 2022-02-11 | End: 2022-08-26

## 2022-02-11 RX ORDER — LORATADINE 10 MG/1
10 TABLET ORAL DAILY
Qty: 90 TABLET | Refills: 1 | Status: ON HOLD | OUTPATIENT
Start: 2022-02-11 | End: 2022-08-26

## 2022-02-11 RX ORDER — FLUCONAZOLE 150 MG/1
150 TABLET ORAL ONCE
Qty: 1 TABLET | Refills: 0 | Status: SHIPPED | OUTPATIENT
Start: 2022-02-11 | End: 2022-02-11

## 2022-02-11 RX ORDER — NITROFURANTOIN 25; 75 MG/1; MG/1
100 CAPSULE ORAL 2 TIMES DAILY
Qty: 14 CAPSULE | Refills: 0 | Status: SHIPPED | OUTPATIENT
Start: 2022-02-11 | End: 2022-06-10

## 2022-02-12 LAB — BACTERIA SPEC AEROBE CULT: NO GROWTH

## 2022-02-21 ENCOUNTER — TELEPHONE (OUTPATIENT)
Dept: FAMILY MEDICINE CLINIC | Facility: CLINIC | Age: 57
End: 2022-02-21

## 2022-03-14 ENCOUNTER — TELEPHONE (OUTPATIENT)
Dept: FAMILY MEDICINE CLINIC | Facility: CLINIC | Age: 57
End: 2022-03-14

## 2022-04-20 ENCOUNTER — TELEPHONE (OUTPATIENT)
Dept: FAMILY MEDICINE CLINIC | Facility: CLINIC | Age: 57
End: 2022-04-20

## 2022-05-05 ENCOUNTER — LAB (OUTPATIENT)
Dept: LAB | Facility: HOSPITAL | Age: 57
End: 2022-05-05

## 2022-05-05 ENCOUNTER — TELEPHONE (OUTPATIENT)
Dept: FAMILY MEDICINE CLINIC | Facility: CLINIC | Age: 57
End: 2022-05-05

## 2022-05-05 ENCOUNTER — HOSPITAL ENCOUNTER (OUTPATIENT)
Dept: GENERAL RADIOLOGY | Facility: HOSPITAL | Age: 57
Discharge: HOME | End: 2022-05-05

## 2022-05-05 DIAGNOSIS — I10 HYPERTENSION, UNSPECIFIED TYPE: ICD-10-CM

## 2022-05-05 DIAGNOSIS — R31.9 HEMATURIA, UNSPECIFIED TYPE: ICD-10-CM

## 2022-05-05 DIAGNOSIS — E78.00 PURE HYPERCHOLESTEROLEMIA: ICD-10-CM

## 2022-05-05 DIAGNOSIS — R39.9 UTI SYMPTOMS: ICD-10-CM

## 2022-05-05 DIAGNOSIS — E55.9 VITAMIN D DEFICIENCY: ICD-10-CM

## 2022-05-05 DIAGNOSIS — Z13.29 SCREENING FOR THYROID DISORDER: ICD-10-CM

## 2022-05-05 LAB
25(OH)D3 SERPL-MCNC: 15.5 NG/ML (ref 30–100)
ALBUMIN SERPL-MCNC: 4.3 G/DL (ref 3.5–5.2)
ALBUMIN/GLOB SERPL: 1.6 G/DL
ALP SERPL-CCNC: 75 U/L (ref 39–117)
ALT SERPL W P-5'-P-CCNC: 12 U/L (ref 1–33)
ANION GAP SERPL CALCULATED.3IONS-SCNC: 12.1 MMOL/L (ref 5–15)
AST SERPL-CCNC: 14 U/L (ref 1–32)
BASOPHILS # BLD AUTO: 0.08 10*3/MM3 (ref 0–0.2)
BASOPHILS NFR BLD AUTO: 0.8 % (ref 0–1.5)
BILIRUB SERPL-MCNC: 0.3 MG/DL (ref 0–1.2)
BUN SERPL-MCNC: 14 MG/DL (ref 6–20)
BUN/CREAT SERPL: 19.4 (ref 7–25)
CALCIUM SPEC-SCNC: 9.6 MG/DL (ref 8.6–10.5)
CHLORIDE SERPL-SCNC: 106 MMOL/L (ref 98–107)
CHOLEST SERPL-MCNC: 263 MG/DL (ref 0–200)
CO2 SERPL-SCNC: 22.9 MMOL/L (ref 22–29)
CREAT SERPL-MCNC: 0.72 MG/DL (ref 0.57–1)
DEPRECATED RDW RBC AUTO: 45.7 FL (ref 37–54)
EGFRCR SERPLBLD CKD-EPI 2021: 98.3 ML/MIN/1.73
EOSINOPHIL # BLD AUTO: 0.27 10*3/MM3 (ref 0–0.4)
EOSINOPHIL NFR BLD AUTO: 2.6 % (ref 0.3–6.2)
ERYTHROCYTE [DISTWIDTH] IN BLOOD BY AUTOMATED COUNT: 13.2 % (ref 12.3–15.4)
GLOBULIN UR ELPH-MCNC: 2.7 GM/DL
GLUCOSE SERPL-MCNC: 97 MG/DL (ref 65–99)
HCT VFR BLD AUTO: 48.2 % (ref 34–46.6)
HDLC SERPL-MCNC: 36 MG/DL (ref 40–60)
HGB BLD-MCNC: 15.9 G/DL (ref 12–15.9)
IMM GRANULOCYTES # BLD AUTO: 0.05 10*3/MM3 (ref 0–0.05)
IMM GRANULOCYTES NFR BLD AUTO: 0.5 % (ref 0–0.5)
LDLC SERPL CALC-MCNC: 201 MG/DL (ref 0–100)
LDLC/HDLC SERPL: 5.53 {RATIO}
LYMPHOCYTES # BLD AUTO: 3.35 10*3/MM3 (ref 0.7–3.1)
LYMPHOCYTES NFR BLD AUTO: 32.1 % (ref 19.6–45.3)
MCH RBC QN AUTO: 31.1 PG (ref 26.6–33)
MCHC RBC AUTO-ENTMCNC: 33 G/DL (ref 31.5–35.7)
MCV RBC AUTO: 94.1 FL (ref 79–97)
MONOCYTES # BLD AUTO: 0.75 10*3/MM3 (ref 0.1–0.9)
MONOCYTES NFR BLD AUTO: 7.2 % (ref 5–12)
NEUTROPHILS NFR BLD AUTO: 5.94 10*3/MM3 (ref 1.7–7)
NEUTROPHILS NFR BLD AUTO: 56.8 % (ref 42.7–76)
NRBC BLD AUTO-RTO: 0 /100 WBC (ref 0–0.2)
PLATELET # BLD AUTO: 309 10*3/MM3 (ref 140–450)
PMV BLD AUTO: 11.3 FL (ref 6–12)
POTASSIUM SERPL-SCNC: 4.2 MMOL/L (ref 3.5–5.2)
PROT SERPL-MCNC: 7 G/DL (ref 6–8.5)
RBC # BLD AUTO: 5.12 10*6/MM3 (ref 3.77–5.28)
SODIUM SERPL-SCNC: 141 MMOL/L (ref 136–145)
T4 FREE SERPL-MCNC: 1.04 NG/DL (ref 0.93–1.7)
TRIGL SERPL-MCNC: 140 MG/DL (ref 0–150)
TSH SERPL DL<=0.05 MIU/L-ACNC: 3.66 UIU/ML (ref 0.27–4.2)
VLDLC SERPL-MCNC: 26 MG/DL (ref 5–40)
WBC NRBC COR # BLD: 10.44 10*3/MM3 (ref 3.4–10.8)

## 2022-05-05 PROCEDURE — 80053 COMPREHEN METABOLIC PANEL: CPT

## 2022-05-05 PROCEDURE — 84443 ASSAY THYROID STIM HORMONE: CPT

## 2022-05-05 PROCEDURE — 85025 COMPLETE CBC W/AUTO DIFF WBC: CPT

## 2022-05-05 PROCEDURE — 82306 VITAMIN D 25 HYDROXY: CPT

## 2022-05-05 PROCEDURE — 80061 LIPID PANEL: CPT

## 2022-05-05 PROCEDURE — 84439 ASSAY OF FREE THYROXINE: CPT

## 2022-05-05 PROCEDURE — 74018 RADEX ABDOMEN 1 VIEW: CPT

## 2022-05-05 PROCEDURE — 36415 COLL VENOUS BLD VENIPUNCTURE: CPT

## 2022-05-05 NOTE — TELEPHONE ENCOUNTER
----- Message from BULL Langford sent at 5/5/2022  8:52 AM EDT -----  KUB XR - reveals moderate amount of stool - constipation.  No renal stones appreciated.

## 2022-05-06 ENCOUNTER — TELEPHONE (OUTPATIENT)
Dept: FAMILY MEDICINE CLINIC | Facility: CLINIC | Age: 57
End: 2022-05-06

## 2022-05-06 DIAGNOSIS — E78.00 PURE HYPERCHOLESTEROLEMIA: Primary | ICD-10-CM

## 2022-05-06 DIAGNOSIS — E55.9 VITAMIN D DEFICIENCY: ICD-10-CM

## 2022-05-06 RX ORDER — ERGOCALCIFEROL 1.25 MG/1
50000 CAPSULE ORAL WEEKLY
Qty: 13 CAPSULE | Refills: 1 | Status: SHIPPED | OUTPATIENT
Start: 2022-05-06 | End: 2022-06-10

## 2022-05-06 RX ORDER — ROSUVASTATIN CALCIUM 10 MG/1
10 TABLET, COATED ORAL DAILY
Qty: 90 TABLET | Refills: 1 | Status: SHIPPED | OUTPATIENT
Start: 2022-05-06 | End: 2022-06-10

## 2022-05-06 NOTE — TELEPHONE ENCOUNTER
----- Message from BULL Langford sent at 5/5/2022  7:52 PM EDT -----  Vitamin D Deficiency noted - begin Vitamin D 50,000 IU weekly #13x1RF    Hyperlipidemia - begin crestor 10mg nightly #90x1RF - low chol diet and exercise

## 2022-05-11 DIAGNOSIS — J44.1 COPD WITH EXACERBATION: ICD-10-CM

## 2022-06-10 ENCOUNTER — OFFICE VISIT (OUTPATIENT)
Dept: FAMILY MEDICINE CLINIC | Facility: CLINIC | Age: 57
End: 2022-06-10

## 2022-06-10 ENCOUNTER — LAB (OUTPATIENT)
Dept: LAB | Facility: HOSPITAL | Age: 57
End: 2022-06-10

## 2022-06-10 VITALS
HEART RATE: 102 BPM | HEIGHT: 65 IN | WEIGHT: 232 LBS | SYSTOLIC BLOOD PRESSURE: 142 MMHG | BODY MASS INDEX: 38.65 KG/M2 | OXYGEN SATURATION: 96 % | DIASTOLIC BLOOD PRESSURE: 81 MMHG

## 2022-06-10 DIAGNOSIS — E55.9 VITAMIN D DEFICIENCY: Primary | ICD-10-CM

## 2022-06-10 DIAGNOSIS — F32.A DEPRESSION, UNSPECIFIED DEPRESSION TYPE: ICD-10-CM

## 2022-06-10 DIAGNOSIS — R30.0 DYSURIA: ICD-10-CM

## 2022-06-10 DIAGNOSIS — G47.33 OSA (OBSTRUCTIVE SLEEP APNEA): ICD-10-CM

## 2022-06-10 DIAGNOSIS — M79.7 FIBROMYALGIA: ICD-10-CM

## 2022-06-10 DIAGNOSIS — F41.9 ANXIETY: ICD-10-CM

## 2022-06-10 DIAGNOSIS — E55.9 VITAMIN D DEFICIENCY: ICD-10-CM

## 2022-06-10 LAB
BILIRUB BLD-MCNC: NEGATIVE MG/DL
CLARITY, POC: CLEAR
COLOR UR: YELLOW
EXPIRATION DATE: ABNORMAL
GLUCOSE UR STRIP-MCNC: NEGATIVE MG/DL
KETONES UR QL: ABNORMAL
LEUKOCYTE EST, POC: ABNORMAL
Lab: ABNORMAL
NITRITE UR-MCNC: NEGATIVE MG/ML
PH UR: 7 [PH] (ref 5–8)
PROT UR STRIP-MCNC: ABNORMAL MG/DL
PTH-INTACT SERPL-MCNC: 50.9 PG/ML (ref 15–65)
RBC # UR STRIP: ABNORMAL /UL
SP GR UR: 1.02 (ref 1–1.03)
UROBILINOGEN UR QL: NORMAL

## 2022-06-10 PROCEDURE — 99213 OFFICE O/P EST LOW 20 MIN: CPT | Performed by: PHYSICIAN ASSISTANT

## 2022-06-10 PROCEDURE — 83970 ASSAY OF PARATHORMONE: CPT

## 2022-06-10 PROCEDURE — 87086 URINE CULTURE/COLONY COUNT: CPT | Performed by: PHYSICIAN ASSISTANT

## 2022-06-10 PROCEDURE — 36415 COLL VENOUS BLD VENIPUNCTURE: CPT

## 2022-06-10 RX ORDER — ERGOCALCIFEROL 1.25 MG/1
50000 CAPSULE ORAL 2 TIMES WEEKLY
Qty: 26 CAPSULE | Refills: 1 | Status: ON HOLD | OUTPATIENT
Start: 2022-06-13 | End: 2022-08-26

## 2022-06-10 NOTE — PROGRESS NOTES
Chief Complaint  Hypertension (4 month follow up), Hyperlipidemia, and Vitamin D Deficiency    Subjective          Lindsey Mckinnon presents to [facility]  History of Present Illness   Pt presents today for 4 month follow up.    Pt states she has extreme fatigue and exhausted. Pt states she does not sleep well, can got to sleep but unable to stay asleep. Pt states this has been going on for a while.  Pt states she is so fatigue unable to do anything with family or friends.    Pt requested to have urine checked due to odor. Pt states her urine always has an odor.    Pt would like to discuss recent labs from 5/5/22.    Pt refuses to take statin, she stopped it on her own.    Labs 5/5/22      Pt has pmh of fibromyalgia - she is having fatigue.  Has seen rheum.    Past Medical History:   Diagnosis Date   • Anemia    • Anxiety    • Arthritis    • ASCVD (arteriosclerotic cardiovascular disease)    • Benign essential hypertension    • Breast lump    • Cervical spine pain    • Chest pain    • Chronic allergic rhinitis    • COPD (chronic obstructive pulmonary disease) (HCC) 04/15/2021   • Degenerative disc disease, cervical    • Depression    • Esophageal reflux    • Essential hypertension 04/15/2021   • Fibromyalgia    • Forgetfulness    • Gall stones    • GERD (gastroesophageal reflux disease)    • H/O emphysema    • Heart attack ([facility])    • Hemorrhoid    • Hernia cerebri ([facility])    • Hernia, hiatal    • Herniated disc, cervical    • High blood pressure    • High cholesterol    • Hyperlipemia    • Hyperlipidemia 04/15/2021   • Hypertension    • Kidney stone    • Limb swelling    • Lumbar pain    • Migraine    • Mood disorder ([facility])    • Muscle cramps    • Nicotine dependence 04/15/2021   • Reflux esophagitis    • Shortness of breath    • Spinal stenosis    • Tremor 04/15/2021      Family History   Problem Relation Age of Onset   • Lung cancer Mother         MALIGNANT   • Diabetes  Mother         UNSPECIFIED TYPE   • Cancer Mother         BLADDER   • Osteoporosis Mother    • Arthritis Mother    • Lung cancer Father         MALIGNANT   • Diabetes Father         UNSPECIFIED TYPE/ MELLITUS   • Heart attack Father    • Kidney cancer Father    • Cancer Father         BLADDER   • Arthritis Father    • Heart disease Father    • Diabetes Brother         UNSPECIFIED TYPE/MELLITUS   • Kidney cancer Brother    • Arthritis Brother    • Arthritis Son       Past Surgical History:   Procedure Laterality Date   • CERVICAL EPIDURAL      STERIOD INJECTIONS    •  SECTION      X 2   •  SECTION  ,   • CHOLECYSTECTOMY     • ENDOSCOPY     • GALLBLADDER SURGERY     • HYSTERECTOMY     • HYSTERECTOMY     • LUMBAR EPIDURAL INJECTION      STERIOD          Current Outpatient Medications:   •  albuterol (ACCUNEB) 0.63 MG/3ML nebulizer solution, Take 3 mL by nebulization Every 6 (Six) Hours As Needed for Wheezing., Disp: 60 each, Rfl: 0  •  Breo Ellipta 200-25 MCG/INH inhaler, INHALE 1 PUFF BY MOUTH DAILY, Disp: 60 each, Rfl: 1  •  loratadine (CLARITIN) 10 MG tablet, Take 1 tablet by mouth Daily., Disp: 90 tablet, Rfl: 1  •  losartan (COZAAR) 100 MG tablet, Take 1 tablet by mouth Daily., Disp: 90 tablet, Rfl: 1  •  oxyCODONE ER (oxyCONTIN) 15 MG tablet extended-release 12 hour, Take 15 mg by mouth Every 12 (Twelve) Hours., Disp: , Rfl:   •  pantoprazole (PROTONIX) 40 MG EC tablet, Take 1 tablet by mouth Daily., Disp: 30 tablet, Rfl: 3  •  tamsulosin (FLOMAX) 0.4 MG capsule 24 hr capsule, Take 1 capsule by mouth Daily for 360 days., Disp: 90 capsule, Rfl: 3  •  tiotropium (Spiriva HandiHaler) 18 MCG per inhalation capsule, Place 1 capsule into inhaler and inhale Daily., Disp: 30 capsule, Rfl: 5  •  milnacipran (Savella) 25 MG tablet tablet, Take 1 tab PO BID for 1 week, then 2 tabs PO BID thereafter, Disp: 98 tablet, Rfl: 0  •  nicotine (EQ Nicotine) 7 MG/24HR patch, Place 1 patch on the skin  "as directed by provider Daily., Disp: 30 each, Rfl: 2  •  nicotine (NICODERM CQ) 14 MG/24HR patch, Place 1 patch on the skin as directed by provider Daily., Disp: 14 each, Rfl: 0  •  nicotine (NICODERM CQ) 21 MG/24HR patch, Place 1 patch on the skin as directed by provider Daily., Disp: 14 each, Rfl: 0  •  [START ON 6/13/2022] vitamin D (ERGOCALCIFEROL) 1.25 MG (94900 UT) capsule capsule, Take 1 capsule by mouth 2 (Two) Times a Week., Disp: 26 capsule, Rfl: 1    Objective     Vital Signs:     /81 (BP Location: Right arm)   Pulse 102   Ht 165 cm (64.96\")   Wt 105 kg (232 lb)   SpO2 96%   BMI 38.65 kg/m²    Estimated body mass index is 38.65 kg/m² as calculated from the following:    Height as of this encounter: 165 cm (64.96\").    Weight as of this encounter: 105 kg (232 lb).     Wt Readings from Last 3 Encounters:   06/10/22 105 kg (232 lb)   02/25/22 103 kg (227 lb 1.2 oz)   02/11/22 103 kg (228 lb)     BP Readings from Last 3 Encounters:   06/10/22 142/81   02/25/22 114/79   02/11/22 123/84     Physical Exam  Vitals and nursing note reviewed.   Constitutional:       Appearance: Normal appearance. She is obese.   HENT:      Head: Normocephalic and atraumatic.   Cardiovascular:      Rate and Rhythm: Normal rate and regular rhythm.      Heart sounds: Normal heart sounds.   Pulmonary:      Effort: Pulmonary effort is normal.      Breath sounds: Normal breath sounds.   Musculoskeletal:      Cervical back: Neck supple.   Neurological:      Mental Status: She is alert.   Psychiatric:         Mood and Affect: Mood normal.         Behavior: Behavior normal.        Result Review :     Common labs    Common Labsle 8/12/21 8/12/21 8/12/21 8/12/21 5/5/22 5/5/22 5/5/22    0952 [id number] 0952 0952 0750 0750 0750   Glucose   116 (A)    97   BUN   9    14   Creatinine   0.71    0.72   eGFR Non African Am   85       Sodium   139    141   Potassium   4.0    4.2   Chloride   106    106   Calcium   9.1    9.6   Albumin   3.80    " 4.30   Total Bilirubin   0.3    0.3   Alkaline Phosphatase   75    75   AST (SGOT)   12    14   ALT (SGPT)   17    12   WBC 12.05 (A)    10.44     Hemoglobin 14.8    15.9     Hematocrit 46.4    48.2 (A)     Platelets 305    309     Total Cholesterol  212 (A)    263 (A)    Triglycerides  136    140    HDL Cholesterol  44    36 (A)    LDL Cholesterol   143 (A)    201 (A)    Hemoglobin A1C    5.80 (A)      (A) Abnormal value                     Patient Care Team:  Andrew Goodman PA as PCP - General (Physician Assistant)         Assessment and Plan      Diagnoses and all orders for this visit:    1. Vitamin D deficiency (Primary)  -     vitamin D (ERGOCALCIFEROL) 1.25 MG (63700 UT) capsule capsule; Take 1 capsule by mouth 2 (Two) Times a Week.  Dispense: 26 capsule; Refill: 1  -     PTH, Intact; Future    2. Dysuria  -     POCT urinalysis dipstick, automated  -     Urine Culture - Urine, Urine, Clean Catch; Future    3. OSMANI (obstructive sleep apnea)  -     Ambulatory Referral to ENT (Otolaryngology)    4. Fibromyalgia  -     milnacipran (Savella) 25 MG tablet tablet; Take 1 tab PO BID for 1 week, then 2 tabs PO BID thereafter  Dispense: 98 tablet; Refill: 0  -     Ambulatory Referral to Rheumatology    5. Anxiety  -     Ambulatory Referral to Psychology    6. Depression, unspecified depression type  -     Ambulatory Referral to Psychology       Follow Up     Return in about 6 months (around 12/10/2022).    Patient was given instructions and counseling regarding her condition or for health maintenance advice. Please see specific information pulled into the AVS if appropriate.     I have reviewed information obtained and documented by others and I have confirmed the accuracy of this documented note.    BULL Langford

## 2022-06-11 LAB — BACTERIA SPEC AEROBE CULT: NORMAL

## 2022-06-29 ENCOUNTER — HOSPITAL ENCOUNTER (EMERGENCY)
Facility: HOSPITAL | Age: 57
Discharge: HOME | End: 2022-06-29
Attending: EMERGENCY MEDICINE | Admitting: EMERGENCY MEDICINE

## 2022-06-29 ENCOUNTER — APPOINTMENT (OUTPATIENT)
Dept: CT IMAGING | Facility: HOSPITAL | Age: 57
End: 2022-06-29

## 2022-06-29 VITALS
RESPIRATION RATE: 22 BRPM | WEIGHT: 232.14 LBS | HEART RATE: 79 BPM | BODY MASS INDEX: 38.68 KG/M2 | DIASTOLIC BLOOD PRESSURE: 96 MMHG | SYSTOLIC BLOOD PRESSURE: 117 MMHG | OXYGEN SATURATION: 96 % | HEIGHT: 65 IN | TEMPERATURE: 97.9 F

## 2022-06-29 DIAGNOSIS — M54.50 ACUTE BILATERAL LOW BACK PAIN, UNSPECIFIED WHETHER SCIATICA PRESENT: ICD-10-CM

## 2022-06-29 DIAGNOSIS — R10.84 GENERALIZED ABDOMINAL PAIN: Primary | ICD-10-CM

## 2022-06-29 LAB
ALBUMIN SERPL-MCNC: 4.1 G/DL (ref 3.5–5.2)
ALBUMIN/GLOB SERPL: 1.3 G/DL
ALP SERPL-CCNC: 86 U/L (ref 39–117)
ALT SERPL W P-5'-P-CCNC: 12 U/L (ref 1–33)
ANION GAP SERPL CALCULATED.3IONS-SCNC: 8.8 MMOL/L (ref 5–15)
AST SERPL-CCNC: 11 U/L (ref 1–32)
BACTERIA UR QL AUTO: ABNORMAL /HPF
BASOPHILS # BLD AUTO: 0.07 10*3/MM3 (ref 0–0.2)
BASOPHILS NFR BLD AUTO: 0.6 % (ref 0–1.5)
BILIRUB SERPL-MCNC: 0.2 MG/DL (ref 0–1.2)
BILIRUB UR QL STRIP: NEGATIVE
BUN SERPL-MCNC: 13 MG/DL (ref 6–20)
BUN/CREAT SERPL: 18.1 (ref 7–25)
CALCIUM SPEC-SCNC: 10 MG/DL (ref 8.6–10.5)
CHLORIDE SERPL-SCNC: 106 MMOL/L (ref 98–107)
CLARITY UR: CLEAR
CO2 SERPL-SCNC: 26.2 MMOL/L (ref 22–29)
COLOR UR: YELLOW
CREAT SERPL-MCNC: 0.72 MG/DL (ref 0.57–1)
DEPRECATED RDW RBC AUTO: 46.3 FL (ref 37–54)
EGFRCR SERPLBLD CKD-EPI 2021: 97.7 ML/MIN/1.73
EOSINOPHIL # BLD AUTO: 0.18 10*3/MM3 (ref 0–0.4)
EOSINOPHIL NFR BLD AUTO: 1.5 % (ref 0.3–6.2)
ERYTHROCYTE [DISTWIDTH] IN BLOOD BY AUTOMATED COUNT: 13.2 % (ref 12.3–15.4)
GLOBULIN UR ELPH-MCNC: 3.2 GM/DL
GLUCOSE SERPL-MCNC: 115 MG/DL (ref 65–99)
GLUCOSE UR STRIP-MCNC: NEGATIVE MG/DL
HCT VFR BLD AUTO: 47.4 % (ref 34–46.6)
HGB BLD-MCNC: 15.7 G/DL (ref 12–15.9)
HGB UR QL STRIP.AUTO: ABNORMAL
HOLD SPECIMEN: NORMAL
HYALINE CASTS UR QL AUTO: ABNORMAL /LPF
IMM GRANULOCYTES # BLD AUTO: 0.05 10*3/MM3 (ref 0–0.05)
IMM GRANULOCYTES NFR BLD AUTO: 0.4 % (ref 0–0.5)
KETONES UR QL STRIP: NEGATIVE
LEUKOCYTE ESTERASE UR QL STRIP.AUTO: NEGATIVE
LIPASE SERPL-CCNC: 57 U/L (ref 13–60)
LYMPHOCYTES # BLD AUTO: 2.79 10*3/MM3 (ref 0.7–3.1)
LYMPHOCYTES NFR BLD AUTO: 23.8 % (ref 19.6–45.3)
MCH RBC QN AUTO: 31.3 PG (ref 26.6–33)
MCHC RBC AUTO-ENTMCNC: 33.1 G/DL (ref 31.5–35.7)
MCV RBC AUTO: 94.6 FL (ref 79–97)
MONOCYTES # BLD AUTO: 0.7 10*3/MM3 (ref 0.1–0.9)
MONOCYTES NFR BLD AUTO: 6 % (ref 5–12)
NEUTROPHILS NFR BLD AUTO: 67.7 % (ref 42.7–76)
NEUTROPHILS NFR BLD AUTO: 7.92 10*3/MM3 (ref 1.7–7)
NITRITE UR QL STRIP: NEGATIVE
NRBC BLD AUTO-RTO: 0 /100 WBC (ref 0–0.2)
PH UR STRIP.AUTO: 5.5 [PH] (ref 5–8)
PLATELET # BLD AUTO: 293 10*3/MM3 (ref 140–450)
PMV BLD AUTO: 10.3 FL (ref 6–12)
POTASSIUM SERPL-SCNC: 4.3 MMOL/L (ref 3.5–5.2)
PROT SERPL-MCNC: 7.3 G/DL (ref 6–8.5)
PROT UR QL STRIP: NEGATIVE
RBC # BLD AUTO: 5.01 10*6/MM3 (ref 3.77–5.28)
RBC # UR STRIP: ABNORMAL /HPF
REF LAB TEST METHOD: ABNORMAL
SODIUM SERPL-SCNC: 141 MMOL/L (ref 136–145)
SP GR UR STRIP: 1.01 (ref 1–1.03)
SQUAMOUS #/AREA URNS HPF: ABNORMAL /HPF
UROBILINOGEN UR QL STRIP: ABNORMAL
WBC # UR STRIP: ABNORMAL /HPF
WBC NRBC COR # BLD: 11.71 10*3/MM3 (ref 3.4–10.8)
WHOLE BLOOD HOLD COAG: NORMAL

## 2022-06-29 PROCEDURE — 83690 ASSAY OF LIPASE: CPT | Performed by: EMERGENCY MEDICINE

## 2022-06-29 PROCEDURE — 25010000002 HYDROMORPHONE 1 MG/ML SOLUTION: Performed by: EMERGENCY MEDICINE

## 2022-06-29 PROCEDURE — 85025 COMPLETE CBC W/AUTO DIFF WBC: CPT | Performed by: EMERGENCY MEDICINE

## 2022-06-29 PROCEDURE — 96374 THER/PROPH/DIAG INJ IV PUSH: CPT

## 2022-06-29 PROCEDURE — 0 IOPAMIDOL PER 1 ML: Performed by: EMERGENCY MEDICINE

## 2022-06-29 PROCEDURE — 25010000002 ONDANSETRON PER 1 MG: Performed by: EMERGENCY MEDICINE

## 2022-06-29 PROCEDURE — 96375 TX/PRO/DX INJ NEW DRUG ADDON: CPT

## 2022-06-29 PROCEDURE — 36415 COLL VENOUS BLD VENIPUNCTURE: CPT

## 2022-06-29 PROCEDURE — 80053 COMPREHEN METABOLIC PANEL: CPT | Performed by: EMERGENCY MEDICINE

## 2022-06-29 PROCEDURE — 74177 CT ABD & PELVIS W/CONTRAST: CPT

## 2022-06-29 PROCEDURE — 99283 EMERGENCY DEPT VISIT LOW MDM: CPT

## 2022-06-29 PROCEDURE — 81001 URINALYSIS AUTO W/SCOPE: CPT | Performed by: EMERGENCY MEDICINE

## 2022-06-29 RX ORDER — ONDANSETRON 2 MG/ML
4 INJECTION INTRAMUSCULAR; INTRAVENOUS ONCE
Status: COMPLETED | OUTPATIENT
Start: 2022-06-29 | End: 2022-06-29

## 2022-06-29 RX ORDER — HYDROCODONE BITARTRATE AND ACETAMINOPHEN 5; 325 MG/1; MG/1
1 TABLET ORAL EVERY 6 HOURS PRN
Qty: 12 TABLET | Refills: 0 | Status: SHIPPED | OUTPATIENT
Start: 2022-06-29 | End: 2022-07-21

## 2022-06-29 RX ADMIN — SODIUM CHLORIDE 1000 ML: 9 INJECTION, SOLUTION INTRAVENOUS at 04:44

## 2022-06-29 RX ADMIN — HYDROMORPHONE HYDROCHLORIDE 0.5 MG: 1 INJECTION, SOLUTION INTRAMUSCULAR; INTRAVENOUS; SUBCUTANEOUS at 04:43

## 2022-06-29 RX ADMIN — ONDANSETRON 4 MG: 2 INJECTION INTRAMUSCULAR; INTRAVENOUS at 04:43

## 2022-06-29 RX ADMIN — IOPAMIDOL 100 ML: 755 INJECTION, SOLUTION INTRAVENOUS at 05:02

## 2022-07-13 DIAGNOSIS — J44.1 COPD WITH EXACERBATION: ICD-10-CM

## 2022-07-21 ENCOUNTER — OFFICE VISIT (OUTPATIENT)
Dept: PSYCHIATRY | Facility: CLINIC | Age: 57
End: 2022-07-21

## 2022-07-21 VITALS
HEIGHT: 65 IN | BODY MASS INDEX: 37.92 KG/M2 | SYSTOLIC BLOOD PRESSURE: 127 MMHG | DIASTOLIC BLOOD PRESSURE: 87 MMHG | WEIGHT: 227.6 LBS

## 2022-07-21 DIAGNOSIS — F41.1 GENERALIZED ANXIETY DISORDER: ICD-10-CM

## 2022-07-21 DIAGNOSIS — F51.05 INSOMNIA DUE TO MENTAL DISORDER: ICD-10-CM

## 2022-07-21 DIAGNOSIS — F33.1 MAJOR DEPRESSIVE DISORDER, RECURRENT EPISODE, MODERATE: Primary | ICD-10-CM

## 2022-07-21 PROBLEM — M47.817 LUMBOSACRAL SPONDYLOSIS WITHOUT MYELOPATHY: Status: ACTIVE | Noted: 2022-07-21

## 2022-07-21 PROCEDURE — 90792 PSYCH DIAG EVAL W/MED SRVCS: CPT | Performed by: NURSE PRACTITIONER

## 2022-07-21 RX ORDER — BENZONATATE 200 MG/1
CAPSULE ORAL
COMMUNITY
End: 2022-07-21

## 2022-07-21 RX ORDER — CIPROFLOXACIN 500 MG/1
TABLET, FILM COATED ORAL
COMMUNITY
End: 2022-07-21

## 2022-07-21 RX ORDER — POLYETHYLENE GLYCOL 3350 17 G/17G
POWDER, FOR SOLUTION ORAL
Status: ON HOLD | COMMUNITY
End: 2022-08-26

## 2022-07-21 RX ORDER — PREDNISONE 20 MG/1
TABLET ORAL
COMMUNITY
End: 2022-07-21

## 2022-07-21 RX ORDER — ONDANSETRON 4 MG/1
TABLET, ORALLY DISINTEGRATING ORAL
Status: ON HOLD | COMMUNITY
Start: 2022-06-22 | End: 2022-08-26

## 2022-07-21 RX ORDER — PHENAZOPYRIDINE HYDROCHLORIDE 200 MG/1
TABLET, FILM COATED ORAL
Status: ON HOLD | COMMUNITY
End: 2022-08-26

## 2022-07-21 RX ORDER — SERTRALINE HYDROCHLORIDE 100 MG/1
150 TABLET, FILM COATED ORAL NIGHTLY
Qty: 45 TABLET | Refills: 1 | Status: SHIPPED | OUTPATIENT
Start: 2022-07-21 | End: 2022-11-30 | Stop reason: SDUPTHER

## 2022-07-21 RX ORDER — ROSUVASTATIN CALCIUM 10 MG/1
TABLET, COATED ORAL
COMMUNITY
End: 2022-07-21 | Stop reason: SINTOL

## 2022-07-21 RX ORDER — METAXALONE 800 MG/1
800 TABLET ORAL 3 TIMES DAILY PRN
COMMUNITY
Start: 2022-05-19

## 2022-07-21 RX ORDER — MELOXICAM 7.5 MG/1
7.5 TABLET ORAL DAILY PRN
COMMUNITY
Start: 2022-05-11 | End: 2022-11-30 | Stop reason: HOSPADM

## 2022-07-21 RX ORDER — SERTRALINE HYDROCHLORIDE 100 MG/1
100 TABLET, FILM COATED ORAL NIGHTLY
COMMUNITY
End: 2022-07-21 | Stop reason: SDUPTHER

## 2022-07-21 NOTE — TREATMENT PLAN
Multi-Disciplinary Problems (from Behavioral Health Treatment Plan)    Active Problems     Problem: Depression  Start Date: 07/21/22    Problem Details: The patient self-scales this problem as UTR        Goal Priority Start Date Expected End Date End Date    Patient will demonstrate the ability to initiate new constructive life skills outside of sessions on a consistent basis. -- 07/21/22 -- --    Goal Details: Progress toward goal:  Not appropriate to rate progress toward goal since this is the initial treatment plan.        Goal Intervention Frequency Start Date End Date    Assist patient in setting attainable activities of daily living goals. PRN 07/21/22 --    Goal Intervention Frequency Start Date End Date    Provide education about depression Weekly 07/21/22 --    Intervention Details: Duration of treatment until until remission of symptoms.        Goal Intervention Frequency Start Date End Date    Assist patient in developing healthy coping strategies. Weekly 07/21/22 --    Intervention Details: Duration of treatment until until remission of symptoms.                    Reviewed By     Que Rosas APRN 07/21/22 [id number]                 I have discussed and reviewed this treatment plan with the patient.

## 2022-07-21 NOTE — PROGRESS NOTES
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 Moderate (A) Negative Final   • Urine Culture 08/16/2021 No growth   Final   There may be more visits with results that are not included.       EKG Results:  No orders to display       Imaging Results:  CT Abdomen Pelvis With Contrast    Result Date: 6/29/2022    No acute findings are appreciated.  Please see above comments for further detail.    COMMENT:  Part of this note is an electronic transcription of spoken language to printed text. The electronic translation/transcription may permit erroneous, or at times, nonsensical (or even sensical) words or phrases to be inadvertently transcribed or omitted; this  has reviewed the note for such errors (as well as additional errors); however, some may still exist.  STEVO OJEDA JR, MD       Electronically Signed and Approved By: STEVO OJEDA JR, MD on 6/29/2022 at 5:29              XR Abdomen KUB    Result Date: 5/5/2022    1. Nonspecific bowel gas pattern. A moderate stool burden is seen throughout the colon. 2. No definitive evidence for nephrolithiasis.    NILTON ASHFORD MD       Electronically Signed and Approved By: NILTON ASHFORD MD on 5/05/2022 at 8:47                 Assessment & Plan   Diagnoses and all orders for this visit:    1. Major depressive disorder, recurrent episode, moderate (HCC) (Primary)  -     sertraline (ZOLOFT) 100 MG tablet; Take 1.5 tablets by mouth Every Night for  60 days.  Dispense: 45 tablet; Refill: 1  -     Ambulatory Referral to Psychotherapy    2. Generalized anxiety disorder    3. Insomnia due to mental disorder      Presents with history of depression and anxiety. Will continue serteraline to target depression and anxiety. Will refer for psychotherapy. 15 minutes of supportive psychotherapy with goal to strengthen defenses, promote problems solving, restore adaptive functioning and provide symptom relief. The therapeutic alliance was strengthened to encourage the patient to express their thoughts and feelings. Esteem building was enhanced through praise, reassurance, normalizing and encouragement. Coping skills were enhanced to build distress tolerance skills and emotional regulation. Patient given education on medication side effects, diagnosis/illness and relapse symptoms. Plan to continue supportive psychotherapy in next appointment to provide symptom relief. 1 month    Visit Diagnoses:    ICD-10-CM ICD-9-CM   1. Major depressive disorder, recurrent episode, moderate (HCC)  F33.1 296.32   2. Generalized anxiety disorder  F41.1 300.02   3. Insomnia due to mental disorder  F51.05 300.9     327.02       PLAN:  1. Safety: No acute safety concerns.   2. Therapy: Karen Azevedo  3. Risk Assessment: Risk of self-harm acutely is moderate.  Risk factors include anxiety disorder, mood disorder, and recent psychosocial stressors (pandemic). Protective factors include no family history, denies access to guns/weapons, no present SI, no history of suicide attempts or self-harm in the past, minimal AODA, healthcare seeking, future orientation, willingness to engage in care.  Risk of self-harm chronically is also moderate, but could be further elevated in the event of treatment noncompliance and/or AODA.  4. Medications: Increase sertraline from 100mg to 150 mg p.o. daily to target depression and anxiety. Risks, benefits, alternatives discussed with patient including GI upset,  nausea vomiting diarrhea, theoretical decrease of seizure threshold predisposing the patient to a slightly higher seizure risk, headaches, sexual dysfunction, serotonin syndrome, bleeding risk, increased suicidality in patients 24 years and younger.  After discussion of these risks and benefits, the patient voiced understanding and agreed to proceed.  5. Labs/studies: No labs/studies ordered at this time  6. Follow-up: 1 month    Patient screened positive for depression based on a PHQ-9 score of 19 on 7/21/2022. Follow-up recommendations include: Suicide Risk Assessment performed.         TREATMENT PLAN/GOALS: Continue supportive psychotherapy efforts and medications as indicated. Treatment and medication options discussed during today's visit. Patient ackowledged and verbally consented to continue with current treatment plan and was educated on the importance of compliance with treatment and follow-up appointments.      MEDICATION ISSUES:  NERY reviewed as expected.  Discussed medication options and treatment plan of prescribed medication as well as the risks, benefits, and side effects including potential falls, possible impaired driving and metabolic adversities among others. Patient is agreeable to call the office with any worsening of symptoms or onset of side effects. Patient is agreeable to call 911 or go to the nearest ER should he/she begin having SI/HI. No medication side effects or related complaints today.     MEDS ORDERED DURING VISIT:  New Medications Ordered This Visit   Medications   • sertraline (ZOLOFT) 100 MG tablet     Sig: Take 1.5 tablets by mouth Every Night for 60 days.     Dispense:  45 tablet     Refill:  1       Return in about 4 weeks (around 8/18/2022) for Next scheduled follow up.         This document has been electronically signed by SOFIA Nelson  July 21, 2022 15:14 EDT      Part of this note may be an electronic transcription/translation of spoken language to printed text  using the Dragon Dictation System.

## 2022-08-17 DIAGNOSIS — J44.1 COPD WITH EXACERBATION: ICD-10-CM

## 2022-08-17 RX ORDER — TIOTROPIUM BROMIDE 18 UG/1
CAPSULE ORAL; RESPIRATORY (INHALATION)
Qty: 30 CAPSULE | Refills: 5 | Status: SHIPPED | OUTPATIENT
Start: 2022-08-17 | End: 2022-11-30 | Stop reason: SDUPTHER

## 2022-08-25 ENCOUNTER — APPOINTMENT (OUTPATIENT)
Dept: GENERAL RADIOLOGY | Facility: HOSPITAL | Age: 57
End: 2022-08-25

## 2022-08-25 ENCOUNTER — HOSPITAL ENCOUNTER (INPATIENT)
Facility: HOSPITAL | Age: 57
LOS: 3 days | Discharge: HOME | End: 2022-08-28
Attending: EMERGENCY MEDICINE | Admitting: INTERNAL MEDICINE

## 2022-08-25 DIAGNOSIS — I21.19 ACUTE ST ELEVATION MYOCARDIAL INFARCTION (STEMI) INVOLVING OTHER CORONARY ARTERY OF INFERIOR WALL: Primary | ICD-10-CM

## 2022-08-25 DIAGNOSIS — R57.0 CARDIOGENIC SHOCK: ICD-10-CM

## 2022-08-25 DIAGNOSIS — I21.11 ST ELEVATION MYOCARDIAL INFARCTION INVOLVING RIGHT CORONARY ARTERY: ICD-10-CM

## 2022-08-25 PROBLEM — I21.3 STEMI (ST ELEVATION MYOCARDIAL INFARCTION) (HCC): Status: ACTIVE | Noted: 2022-08-25

## 2022-08-25 PROBLEM — I25.5 ISCHEMIC CARDIOMYOPATHY: Status: ACTIVE | Noted: 2022-08-25

## 2022-08-25 PROBLEM — I49.01 VENTRICULAR FIBRILLATION: Status: ACTIVE | Noted: 2022-08-25

## 2022-08-25 LAB
ACT BLD: 248 SECONDS (ref 89–137)
ACT BLD: 271 SECONDS (ref 89–137)
ACT BLD: 271 SECONDS (ref 89–137)
ALBUMIN SERPL-MCNC: 2.8 G/DL (ref 3.5–5.2)
ALBUMIN SERPL-MCNC: 3.6 G/DL (ref 3.5–5.2)
ALBUMIN SERPL-MCNC: 3.7 G/DL (ref 3.5–5.2)
ALBUMIN/GLOB SERPL: 1.2 G/DL
ALBUMIN/GLOB SERPL: 1.4 G/DL
ALBUMIN/GLOB SERPL: 1.4 G/DL
ALP SERPL-CCNC: 62 U/L (ref 39–117)
ALP SERPL-CCNC: 74 U/L (ref 39–117)
ALP SERPL-CCNC: 81 U/L (ref 39–117)
ALT SERPL W P-5'-P-CCNC: 21 U/L (ref 1–33)
ALT SERPL W P-5'-P-CCNC: 28 U/L (ref 1–33)
ALT SERPL W P-5'-P-CCNC: 29 U/L (ref 1–33)
ANION GAP SERPL CALCULATED.3IONS-SCNC: 12.1 MMOL/L (ref 5–15)
ANION GAP SERPL CALCULATED.3IONS-SCNC: 14.6 MMOL/L (ref 5–15)
ANION GAP SERPL CALCULATED.3IONS-SCNC: 8.7 MMOL/L (ref 5–15)
ARTERIAL PATENCY WRIST A: POSITIVE
AST SERPL-CCNC: 24 U/L (ref 1–32)
AST SERPL-CCNC: 37 U/L (ref 1–32)
AST SERPL-CCNC: 50 U/L (ref 1–32)
BASE EXCESS BLDA CALC-SCNC: -8 MMOL/L (ref -2–2)
BASOPHILS # BLD AUTO: 0.12 10*3/MM3 (ref 0–0.2)
BASOPHILS NFR BLD AUTO: 0.7 % (ref 0–1.5)
BDY SITE: ABNORMAL
BILIRUB SERPL-MCNC: 0.2 MG/DL (ref 0–1.2)
BILIRUB SERPL-MCNC: 0.3 MG/DL (ref 0–1.2)
BILIRUB SERPL-MCNC: <0.2 MG/DL (ref 0–1.2)
BUN SERPL-MCNC: 11 MG/DL (ref 6–20)
BUN SERPL-MCNC: 12 MG/DL (ref 6–20)
BUN SERPL-MCNC: 14 MG/DL (ref 6–20)
BUN/CREAT SERPL: 18 (ref 7–25)
BUN/CREAT SERPL: 20 (ref 7–25)
BUN/CREAT SERPL: 21.1 (ref 7–25)
CALCIUM SPEC-SCNC: 7.3 MG/DL (ref 8.6–10.5)
CALCIUM SPEC-SCNC: 7.4 MG/DL (ref 8.6–10.5)
CALCIUM SPEC-SCNC: 7.6 MG/DL (ref 8.6–10.5)
CHLORIDE SERPL-SCNC: 110 MMOL/L (ref 98–107)
CK MB SERPL-CCNC: 38.96 NG/ML
CK MB SERPL-CCNC: 6.65 NG/ML
CK MB SERPL-CCNC: 87.57 NG/ML
CK SERPL-CCNC: 227 U/L (ref 20–180)
CK SERPL-CCNC: 475 U/L (ref 20–180)
CO2 SERPL-SCNC: 15.4 MMOL/L (ref 22–29)
CO2 SERPL-SCNC: 18.9 MMOL/L (ref 22–29)
CO2 SERPL-SCNC: 20.3 MMOL/L (ref 22–29)
COHGB MFR BLD: 3.7 % (ref 0–1.5)
CREAT SERPL-MCNC: 0.57 MG/DL (ref 0.57–1)
CREAT SERPL-MCNC: 0.61 MG/DL (ref 0.57–1)
CREAT SERPL-MCNC: 0.7 MG/DL (ref 0.57–1)
D-LACTATE SERPL-SCNC: 1.7 MMOL/L (ref 0.5–2)
DEPRECATED RDW RBC AUTO: 48.4 FL (ref 37–54)
EGFRCR SERPLBLD CKD-EPI 2021: 101 ML/MIN/1.73
EGFRCR SERPLBLD CKD-EPI 2021: 104.4 ML/MIN/1.73
EGFRCR SERPLBLD CKD-EPI 2021: 106.1 ML/MIN/1.73
EOSINOPHIL # BLD AUTO: 0.32 10*3/MM3 (ref 0–0.4)
EOSINOPHIL NFR BLD AUTO: 1.8 % (ref 0.3–6.2)
ERYTHROCYTE [DISTWIDTH] IN BLOOD BY AUTOMATED COUNT: 13.9 % (ref 12.3–15.4)
FHHB: 4.6 % (ref 0–5)
GAS FLOW AIRWAY: 5 LPM
GLOBULIN UR ELPH-MCNC: 2.4 GM/DL
GLOBULIN UR ELPH-MCNC: 2.5 GM/DL
GLOBULIN UR ELPH-MCNC: 2.7 GM/DL
GLUCOSE SERPL-MCNC: 176 MG/DL (ref 65–99)
GLUCOSE SERPL-MCNC: 177 MG/DL (ref 65–99)
GLUCOSE SERPL-MCNC: 188 MG/DL (ref 65–99)
HCO3 BLDA-SCNC: 19.6 MMOL/L (ref 22–26)
HCT VFR BLD AUTO: 48.1 % (ref 34–46.6)
HGB BLD-MCNC: 15.8 G/DL (ref 12–15.9)
HGB BLDA-MCNC: 15.1 G/DL (ref 11.7–14.6)
HOLD SPECIMEN: NORMAL
IMM GRANULOCYTES # BLD AUTO: 0.18 10*3/MM3 (ref 0–0.05)
IMM GRANULOCYTES NFR BLD AUTO: 1 % (ref 0–0.5)
INHALED O2 CONCENTRATION: 40 %
LACTATE BLDA-SCNC: ABNORMAL MMOL/L
LIPASE SERPL-CCNC: 38 U/L (ref 13–60)
LYMPHOCYTES # BLD AUTO: 4.61 10*3/MM3 (ref 0.7–3.1)
LYMPHOCYTES NFR BLD AUTO: 25.5 % (ref 19.6–45.3)
MAGNESIUM SERPL-MCNC: 1.5 MG/DL (ref 1.6–2.6)
MAGNESIUM SERPL-MCNC: 2.1 MG/DL (ref 1.6–2.6)
MCH RBC QN AUTO: 31 PG (ref 26.6–33)
MCHC RBC AUTO-ENTMCNC: 32.8 G/DL (ref 31.5–35.7)
MCV RBC AUTO: 94.3 FL (ref 79–97)
METHGB BLD QL: 0 % (ref 0–1.5)
MODALITY: ABNORMAL
MONOCYTES # BLD AUTO: 1.01 10*3/MM3 (ref 0.1–0.9)
MONOCYTES NFR BLD AUTO: 5.6 % (ref 5–12)
NEUTROPHILS NFR BLD AUTO: 11.85 10*3/MM3 (ref 1.7–7)
NEUTROPHILS NFR BLD AUTO: 65.4 % (ref 42.7–76)
NRBC BLD AUTO-RTO: 0 /100 WBC (ref 0–0.2)
NT-PROBNP SERPL-MCNC: 45.6 PG/ML (ref 0–900)
OXYHGB MFR BLDV: 91.7 % (ref 94–99)
PCO2 BLDA: 47.9 MM HG (ref 35–45)
PH BLDA: 7.23 PH UNITS (ref 7.35–7.45)
PLATELET # BLD AUTO: 297 10*3/MM3 (ref 140–450)
PMV BLD AUTO: 10.9 FL (ref 6–12)
PO2 BLD: 226 MM[HG] (ref 0–500)
PO2 BLDA: 90.4 MM HG (ref 80–100)
POTASSIUM SERPL-SCNC: 2.9 MMOL/L (ref 3.5–5.2)
POTASSIUM SERPL-SCNC: 3.1 MMOL/L (ref 3.5–5.2)
POTASSIUM SERPL-SCNC: 4.4 MMOL/L (ref 3.5–5.2)
PROT SERPL-MCNC: 5.2 G/DL (ref 6–8.5)
PROT SERPL-MCNC: 6.1 G/DL (ref 6–8.5)
PROT SERPL-MCNC: 6.4 G/DL (ref 6–8.5)
RBC # BLD AUTO: 5.1 10*6/MM3 (ref 3.77–5.28)
SAO2 % BLDCOA: 95.2 % (ref 95–99)
SODIUM SERPL-SCNC: 139 MMOL/L (ref 136–145)
SODIUM SERPL-SCNC: 140 MMOL/L (ref 136–145)
SODIUM SERPL-SCNC: 141 MMOL/L (ref 136–145)
TROPONIN I SERPL-MCNC: 0.02 NG/ML (ref 0–0.08)
TROPONIN T SERPL-MCNC: 0.06 NG/ML (ref 0–0.03)
TROPONIN T SERPL-MCNC: 0.54 NG/ML (ref 0–0.03)
TROPONIN T SERPL-MCNC: 1.13 NG/ML (ref 0–0.03)
WBC NRBC COR # BLD: 18.09 10*3/MM3 (ref 3.4–10.8)
WHOLE BLOOD HOLD COAG: NORMAL
WHOLE BLOOD HOLD SPECIMEN: NORMAL

## 2022-08-25 PROCEDURE — 99285 EMERGENCY DEPT VISIT HI MDM: CPT

## 2022-08-25 PROCEDURE — 25010000002 ATROPINE SULFATE 1 MG/10ML SOLUTION PREFILLED SYRINGE: Performed by: INTERNAL MEDICINE

## 2022-08-25 PROCEDURE — 25010000002 MIDAZOLAM PER 1 MG: Performed by: INTERNAL MEDICINE

## 2022-08-25 PROCEDURE — 94002 VENT MGMT INPAT INIT DAY: CPT

## 2022-08-25 PROCEDURE — 99223 1ST HOSP IP/OBS HIGH 75: CPT | Performed by: INTERNAL MEDICINE

## 2022-08-25 PROCEDURE — 0 IOPAMIDOL PER 1 ML: Performed by: INTERNAL MEDICINE

## 2022-08-25 PROCEDURE — C1725 CATH, TRANSLUMIN NON-LASER: HCPCS | Performed by: INTERNAL MEDICINE

## 2022-08-25 PROCEDURE — 82553 CREATINE MB FRACTION: CPT | Performed by: INTERNAL MEDICINE

## 2022-08-25 PROCEDURE — 80053 COMPREHEN METABOLIC PANEL: CPT | Performed by: INTERNAL MEDICINE

## 2022-08-25 PROCEDURE — 82375 ASSAY CARBOXYHB QUANT: CPT | Performed by: INTERNAL MEDICINE

## 2022-08-25 PROCEDURE — 83880 ASSAY OF NATRIURETIC PEPTIDE: CPT | Performed by: EMERGENCY MEDICINE

## 2022-08-25 PROCEDURE — 92973 PRQ TRLUML C MCHN ASP THRMBC: CPT | Performed by: INTERNAL MEDICINE

## 2022-08-25 PROCEDURE — 25010000002 FENTANYL CITRATE (PF) 50 MCG/ML SOLUTION: Performed by: INTERNAL MEDICINE

## 2022-08-25 PROCEDURE — C1894 INTRO/SHEATH, NON-LASER: HCPCS | Performed by: INTERNAL MEDICINE

## 2022-08-25 PROCEDURE — 85025 COMPLETE CBC W/AUTO DIFF WBC: CPT | Performed by: EMERGENCY MEDICINE

## 2022-08-25 PROCEDURE — 027034Z DILATION OF CORONARY ARTERY, ONE ARTERY WITH DRUG-ELUTING INTRALUMINAL DEVICE, PERCUTANEOUS APPROACH: ICD-10-PCS | Performed by: INTERNAL MEDICINE

## 2022-08-25 PROCEDURE — C9606 PERC D-E COR REVASC W AMI S: HCPCS | Performed by: INTERNAL MEDICINE

## 2022-08-25 PROCEDURE — 25010000002 HEPARIN (PORCINE) PER 1000 UNITS: Performed by: EMERGENCY MEDICINE

## 2022-08-25 PROCEDURE — 5A1935Z RESPIRATORY VENTILATION, LESS THAN 24 CONSECUTIVE HOURS: ICD-10-PCS | Performed by: INTERNAL MEDICINE

## 2022-08-25 PROCEDURE — 94799 UNLISTED PULMONARY SVC/PX: CPT

## 2022-08-25 PROCEDURE — 85347 COAGULATION TIME ACTIVATED: CPT

## 2022-08-25 PROCEDURE — 0 POTASSIUM CHLORIDE 10 MEQ/100ML SOLUTION: Performed by: INTERNAL MEDICINE

## 2022-08-25 PROCEDURE — 82550 ASSAY OF CK (CPK): CPT | Performed by: INTERNAL MEDICINE

## 2022-08-25 PROCEDURE — 0BH17EZ INSERTION OF ENDOTRACHEAL AIRWAY INTO TRACHEA, VIA NATURAL OR ARTIFICIAL OPENING: ICD-10-PCS | Performed by: EMERGENCY MEDICINE

## 2022-08-25 PROCEDURE — 25010000002 ONDANSETRON PER 1 MG: Performed by: INTERNAL MEDICINE

## 2022-08-25 PROCEDURE — 92941 PRQ TRLML REVSC TOT OCCL AMI: CPT | Performed by: INTERNAL MEDICINE

## 2022-08-25 PROCEDURE — 82805 BLOOD GASES W/O2 SATURATION: CPT | Performed by: INTERNAL MEDICINE

## 2022-08-25 PROCEDURE — 84484 ASSAY OF TROPONIN QUANT: CPT | Performed by: INTERNAL MEDICINE

## 2022-08-25 PROCEDURE — 71045 X-RAY EXAM CHEST 1 VIEW: CPT

## 2022-08-25 PROCEDURE — 83050 HGB METHEMOGLOBIN QUAN: CPT | Performed by: INTERNAL MEDICINE

## 2022-08-25 PROCEDURE — 83735 ASSAY OF MAGNESIUM: CPT | Performed by: EMERGENCY MEDICINE

## 2022-08-25 PROCEDURE — 4A023N7 MEASUREMENT OF CARDIAC SAMPLING AND PRESSURE, LEFT HEART, PERCUTANEOUS APPROACH: ICD-10-PCS | Performed by: INTERNAL MEDICINE

## 2022-08-25 PROCEDURE — 84484 ASSAY OF TROPONIN QUANT: CPT

## 2022-08-25 PROCEDURE — 83605 ASSAY OF LACTIC ACID: CPT | Performed by: INTERNAL MEDICINE

## 2022-08-25 PROCEDURE — B2111ZZ FLUOROSCOPY OF MULTIPLE CORONARY ARTERIES USING LOW OSMOLAR CONTRAST: ICD-10-PCS | Performed by: INTERNAL MEDICINE

## 2022-08-25 PROCEDURE — 25010000002 HEPARIN (PORCINE) PER 1000 UNITS: Performed by: INTERNAL MEDICINE

## 2022-08-25 PROCEDURE — 83690 ASSAY OF LIPASE: CPT | Performed by: EMERGENCY MEDICINE

## 2022-08-25 PROCEDURE — 02C03ZZ EXTIRPATION OF MATTER FROM CORONARY ARTERY, ONE ARTERY, PERCUTANEOUS APPROACH: ICD-10-PCS | Performed by: INTERNAL MEDICINE

## 2022-08-25 PROCEDURE — 36600 WITHDRAWAL OF ARTERIAL BLOOD: CPT | Performed by: INTERNAL MEDICINE

## 2022-08-25 PROCEDURE — 93005 ELECTROCARDIOGRAM TRACING: CPT | Performed by: INTERNAL MEDICINE

## 2022-08-25 PROCEDURE — C9460 INJECTION, CANGRELOR: HCPCS | Performed by: INTERNAL MEDICINE

## 2022-08-25 PROCEDURE — C1757 CATH, THROMBECTOMY/EMBOLECT: HCPCS | Performed by: INTERNAL MEDICINE

## 2022-08-25 PROCEDURE — 25010000002 AMIODARONE PER 30 MG: Performed by: INTERNAL MEDICINE

## 2022-08-25 PROCEDURE — C1769 GUIDE WIRE: HCPCS | Performed by: INTERNAL MEDICINE

## 2022-08-25 PROCEDURE — B2151ZZ FLUOROSCOPY OF LEFT HEART USING LOW OSMOLAR CONTRAST: ICD-10-PCS | Performed by: INTERNAL MEDICINE

## 2022-08-25 PROCEDURE — 93458 L HRT ARTERY/VENTRICLE ANGIO: CPT | Performed by: INTERNAL MEDICINE

## 2022-08-25 PROCEDURE — 25010000002 CANGRELOR TETRASODIUM 50 MG RECONSTITUTED SOLUTION 1 EACH VIAL: Performed by: INTERNAL MEDICINE

## 2022-08-25 PROCEDURE — 31500 INSERT EMERGENCY AIRWAY: CPT

## 2022-08-25 PROCEDURE — 99291 CRITICAL CARE FIRST HOUR: CPT | Performed by: INTERNAL MEDICINE

## 2022-08-25 PROCEDURE — C1887 CATHETER, GUIDING: HCPCS | Performed by: INTERNAL MEDICINE

## 2022-08-25 PROCEDURE — C1874 STENT, COATED/COV W/DEL SYS: HCPCS | Performed by: INTERNAL MEDICINE

## 2022-08-25 PROCEDURE — 83735 ASSAY OF MAGNESIUM: CPT | Performed by: INTERNAL MEDICINE

## 2022-08-25 PROCEDURE — 25010000002 AMIODARONE IN DEXTROSE 5% 360-4.14 MG/200ML-% SOLUTION: Performed by: INTERNAL MEDICINE

## 2022-08-25 PROCEDURE — 5A2204Z RESTORATION OF CARDIAC RHYTHM, SINGLE: ICD-10-PCS | Performed by: INTERNAL MEDICINE

## 2022-08-25 DEVICE — XIENCE SKYPOINT™ EVEROLIMUS ELUTING CORONARY STENT SYSTEM 5.00 MM X 23 MM / RAPID-EXCHANGE
Type: IMPLANTABLE DEVICE | Status: FUNCTIONAL
Brand: XIENCE SKYPOINT™

## 2022-08-25 RX ORDER — PANTOPRAZOLE SODIUM 40 MG/1
40 TABLET, DELAYED RELEASE ORAL DAILY
Status: DISCONTINUED | OUTPATIENT
Start: 2022-08-25 | End: 2022-08-25

## 2022-08-25 RX ORDER — BISACODYL 10 MG
10 SUPPOSITORY, RECTAL RECTAL DAILY PRN
Status: DISCONTINUED | OUTPATIENT
Start: 2022-08-25 | End: 2022-08-25

## 2022-08-25 RX ORDER — SODIUM CHLORIDE 0.9 % (FLUSH) 0.9 %
10 SYRINGE (ML) INJECTION AS NEEDED
Status: DISCONTINUED | OUTPATIENT
Start: 2022-08-25 | End: 2022-08-28 | Stop reason: HOSPADM

## 2022-08-25 RX ORDER — NALOXONE HCL 0.4 MG/ML
0.4 VIAL (ML) INJECTION
Status: DISCONTINUED | OUTPATIENT
Start: 2022-08-25 | End: 2022-08-28 | Stop reason: HOSPADM

## 2022-08-25 RX ORDER — ALUMINA, MAGNESIA, AND SIMETHICONE 2400; 2400; 240 MG/30ML; MG/30ML; MG/30ML
15 SUSPENSION ORAL EVERY 6 HOURS PRN
Status: DISCONTINUED | OUTPATIENT
Start: 2022-08-25 | End: 2022-08-25

## 2022-08-25 RX ORDER — POTASSIUM CHLORIDE 7.45 MG/ML
10 INJECTION INTRAVENOUS ONCE
Status: DISCONTINUED | OUTPATIENT
Start: 2022-08-25 | End: 2022-08-25

## 2022-08-25 RX ORDER — ACETAMINOPHEN 325 MG/1
650 TABLET ORAL EVERY 4 HOURS PRN
Status: DISCONTINUED | OUTPATIENT
Start: 2022-08-25 | End: 2022-08-26

## 2022-08-25 RX ORDER — LOSARTAN POTASSIUM 50 MG/1
100 TABLET ORAL
Status: DISCONTINUED | OUTPATIENT
Start: 2022-08-25 | End: 2022-08-25

## 2022-08-25 RX ORDER — BUDESONIDE 0.5 MG/2ML
0.5 INHALANT ORAL
Status: DISCONTINUED | OUTPATIENT
Start: 2022-08-25 | End: 2022-08-28 | Stop reason: HOSPADM

## 2022-08-25 RX ORDER — PANTOPRAZOLE SODIUM 40 MG/10ML
40 INJECTION, POWDER, LYOPHILIZED, FOR SOLUTION INTRAVENOUS
Status: DISCONTINUED | OUTPATIENT
Start: 2022-08-26 | End: 2022-08-28 | Stop reason: HOSPADM

## 2022-08-25 RX ORDER — POLYETHYLENE GLYCOL 3350 17 G/17G
17 POWDER, FOR SOLUTION ORAL DAILY PRN
Status: DISCONTINUED | OUTPATIENT
Start: 2022-08-25 | End: 2022-08-25

## 2022-08-25 RX ORDER — ASPIRIN 81 MG/1
TABLET, CHEWABLE ORAL
Status: COMPLETED | OUTPATIENT
Start: 2022-08-25 | End: 2022-08-25

## 2022-08-25 RX ORDER — BISOPROLOL FUMARATE 5 MG/1
5 TABLET, FILM COATED ORAL
Status: DISCONTINUED | OUTPATIENT
Start: 2022-08-26 | End: 2022-08-25

## 2022-08-25 RX ORDER — AMOXICILLIN 250 MG
2 CAPSULE ORAL 2 TIMES DAILY
Status: DISCONTINUED | OUTPATIENT
Start: 2022-08-25 | End: 2022-08-26

## 2022-08-25 RX ORDER — ASPIRIN 81 MG/1
81 TABLET ORAL DAILY
Status: DISCONTINUED | OUTPATIENT
Start: 2022-08-25 | End: 2022-08-25

## 2022-08-25 RX ORDER — BISOPROLOL FUMARATE 5 MG/1
5 TABLET, FILM COATED ORAL
Status: DISCONTINUED | OUTPATIENT
Start: 2022-08-27 | End: 2022-08-26

## 2022-08-25 RX ORDER — AMOXICILLIN 250 MG
2 CAPSULE ORAL 2 TIMES DAILY
Status: DISCONTINUED | OUTPATIENT
Start: 2022-08-25 | End: 2022-08-25

## 2022-08-25 RX ORDER — BISACODYL 10 MG
10 SUPPOSITORY, RECTAL RECTAL DAILY PRN
Status: DISCONTINUED | OUTPATIENT
Start: 2022-08-25 | End: 2022-08-26

## 2022-08-25 RX ORDER — DEXMEDETOMIDINE HYDROCHLORIDE 4 UG/ML
.2-1.5 INJECTION, SOLUTION INTRAVENOUS
Status: DISCONTINUED | OUTPATIENT
Start: 2022-08-25 | End: 2022-08-26

## 2022-08-25 RX ORDER — PANTOPRAZOLE SODIUM 40 MG/1
40 TABLET, DELAYED RELEASE ORAL DAILY
Status: DISCONTINUED | OUTPATIENT
Start: 2022-08-26 | End: 2022-08-25

## 2022-08-25 RX ORDER — ONDANSETRON 2 MG/ML
4 INJECTION INTRAMUSCULAR; INTRAVENOUS EVERY 6 HOURS PRN
Status: DISCONTINUED | OUTPATIENT
Start: 2022-08-25 | End: 2022-08-28 | Stop reason: HOSPADM

## 2022-08-25 RX ORDER — HYDRALAZINE HYDROCHLORIDE 20 MG/ML
10 INJECTION INTRAMUSCULAR; INTRAVENOUS EVERY 4 HOURS PRN
Status: DISCONTINUED | OUTPATIENT
Start: 2022-08-25 | End: 2022-08-28 | Stop reason: HOSPADM

## 2022-08-25 RX ORDER — LIDOCAINE HYDROCHLORIDE 20 MG/ML
INJECTION, SOLUTION INFILTRATION; PERINEURAL AS NEEDED
Status: DISCONTINUED | OUTPATIENT
Start: 2022-08-25 | End: 2022-08-25 | Stop reason: HOSPADM

## 2022-08-25 RX ORDER — NITROGLYCERIN 0.4 MG/1
0.4 TABLET SUBLINGUAL
Status: DISCONTINUED | OUTPATIENT
Start: 2022-08-25 | End: 2022-08-28 | Stop reason: HOSPADM

## 2022-08-25 RX ORDER — MIDAZOLAM HYDROCHLORIDE 1 MG/ML
INJECTION INTRAMUSCULAR; INTRAVENOUS AS NEEDED
Status: DISCONTINUED | OUTPATIENT
Start: 2022-08-25 | End: 2022-08-25 | Stop reason: HOSPADM

## 2022-08-25 RX ORDER — NALOXONE HCL 0.4 MG/ML
0.4 VIAL (ML) INJECTION
Status: DISCONTINUED | OUTPATIENT
Start: 2022-08-25 | End: 2022-08-25

## 2022-08-25 RX ORDER — ATORVASTATIN CALCIUM 40 MG/1
40 TABLET, FILM COATED ORAL NIGHTLY
Status: DISCONTINUED | OUTPATIENT
Start: 2022-08-25 | End: 2022-08-25

## 2022-08-25 RX ORDER — ACETAMINOPHEN 325 MG/1
650 TABLET ORAL EVERY 4 HOURS PRN
Status: DISCONTINUED | OUTPATIENT
Start: 2022-08-25 | End: 2022-08-25

## 2022-08-25 RX ORDER — ACETAMINOPHEN 325 MG/1
650 TABLET ORAL EVERY 4 HOURS PRN
Status: DISCONTINUED | OUTPATIENT
Start: 2022-08-25 | End: 2022-08-25 | Stop reason: SDUPTHER

## 2022-08-25 RX ORDER — NOREPINEPHRINE BIT/0.9 % NACL 8 MG/250ML
INFUSION BOTTLE (ML) INTRAVENOUS
Status: COMPLETED | OUTPATIENT
Start: 2022-08-25 | End: 2022-08-25

## 2022-08-25 RX ORDER — HYDROMORPHONE HCL 110MG/55ML
0.1 PATIENT CONTROLLED ANALGESIA SYRINGE INTRAVENOUS
Status: DISCONTINUED | OUTPATIENT
Start: 2022-08-25 | End: 2022-08-25

## 2022-08-25 RX ORDER — LORAZEPAM 2 MG/ML
0.5 INJECTION INTRAMUSCULAR EVERY 6 HOURS PRN
Status: DISCONTINUED | OUTPATIENT
Start: 2022-08-25 | End: 2022-08-28 | Stop reason: HOSPADM

## 2022-08-25 RX ORDER — ARFORMOTEROL TARTRATE 15 UG/2ML
15 SOLUTION RESPIRATORY (INHALATION)
Status: DISCONTINUED | OUTPATIENT
Start: 2022-08-25 | End: 2022-08-28 | Stop reason: HOSPADM

## 2022-08-25 RX ORDER — LOSARTAN POTASSIUM 50 MG/1
100 TABLET ORAL
Status: DISCONTINUED | OUTPATIENT
Start: 2022-08-26 | End: 2022-08-25

## 2022-08-25 RX ORDER — BISOPROLOL FUMARATE 5 MG/1
5 TABLET, FILM COATED ORAL
Status: DISCONTINUED | OUTPATIENT
Start: 2022-08-25 | End: 2022-08-25

## 2022-08-25 RX ORDER — ATORVASTATIN CALCIUM 10 MG/1
TABLET, FILM COATED ORAL
Status: COMPLETED | OUTPATIENT
Start: 2022-08-25 | End: 2022-08-25

## 2022-08-25 RX ORDER — LOSARTAN POTASSIUM 50 MG/1
100 TABLET ORAL
Status: DISCONTINUED | OUTPATIENT
Start: 2022-08-27 | End: 2022-08-26

## 2022-08-25 RX ORDER — ALUMINA, MAGNESIA, AND SIMETHICONE 2400; 2400; 240 MG/30ML; MG/30ML; MG/30ML
15 SUSPENSION ORAL EVERY 6 HOURS PRN
Status: DISCONTINUED | OUTPATIENT
Start: 2022-08-25 | End: 2022-08-26

## 2022-08-25 RX ORDER — NOREPINEPHRINE BIT/0.9 % NACL 8 MG/250ML
INFUSION BOTTLE (ML) INTRAVENOUS
Status: COMPLETED
Start: 2022-08-25 | End: 2022-08-25

## 2022-08-25 RX ORDER — BISACODYL 5 MG/1
5 TABLET, DELAYED RELEASE ORAL DAILY PRN
Status: DISCONTINUED | OUTPATIENT
Start: 2022-08-25 | End: 2022-08-25

## 2022-08-25 RX ORDER — ONDANSETRON 2 MG/ML
INJECTION INTRAMUSCULAR; INTRAVENOUS AS NEEDED
Status: DISCONTINUED | OUTPATIENT
Start: 2022-08-25 | End: 2022-08-25 | Stop reason: HOSPADM

## 2022-08-25 RX ORDER — AMIODARONE HYDROCHLORIDE 50 MG/ML
INJECTION, SOLUTION INTRAVENOUS AS NEEDED
Status: DISCONTINUED | OUTPATIENT
Start: 2022-08-25 | End: 2022-08-25 | Stop reason: HOSPADM

## 2022-08-25 RX ORDER — HEPARIN SODIUM 1000 [USP'U]/ML
INJECTION, SOLUTION INTRAVENOUS; SUBCUTANEOUS AS NEEDED
Status: DISCONTINUED | OUTPATIENT
Start: 2022-08-25 | End: 2022-08-25 | Stop reason: HOSPADM

## 2022-08-25 RX ORDER — HEPARIN SODIUM 5000 [USP'U]/ML
INJECTION, SOLUTION INTRAVENOUS; SUBCUTANEOUS
Status: COMPLETED | OUTPATIENT
Start: 2022-08-25 | End: 2022-08-25

## 2022-08-25 RX ORDER — NICOTINE 21 MG/24HR
1 PATCH, TRANSDERMAL 24 HOURS TRANSDERMAL EVERY 24 HOURS
Status: DISCONTINUED | OUTPATIENT
Start: 2022-08-25 | End: 2022-08-25 | Stop reason: SDUPTHER

## 2022-08-25 RX ORDER — ATROPINE SULFATE 0.1 MG/ML
INJECTION INTRAVENOUS AS NEEDED
Status: DISCONTINUED | OUTPATIENT
Start: 2022-08-25 | End: 2022-08-25 | Stop reason: HOSPADM

## 2022-08-25 RX ORDER — ROCURONIUM BROMIDE 10 MG/ML
INJECTION, SOLUTION INTRAVENOUS AS NEEDED
Status: DISCONTINUED | OUTPATIENT
Start: 2022-08-25 | End: 2022-08-25 | Stop reason: HOSPADM

## 2022-08-25 RX ORDER — NOREPINEPHRINE BIT/0.9 % NACL 8 MG/250ML
.02-.3 INFUSION BOTTLE (ML) INTRAVENOUS
Status: DISCONTINUED | OUTPATIENT
Start: 2022-08-25 | End: 2022-08-26

## 2022-08-25 RX ORDER — ATORVASTATIN CALCIUM 40 MG/1
40 TABLET, FILM COATED ORAL NIGHTLY
Status: DISCONTINUED | OUTPATIENT
Start: 2022-08-25 | End: 2022-08-26

## 2022-08-25 RX ORDER — POLYETHYLENE GLYCOL 3350 17 G/17G
17 POWDER, FOR SOLUTION ORAL DAILY PRN
Status: DISCONTINUED | OUTPATIENT
Start: 2022-08-25 | End: 2022-08-26

## 2022-08-25 RX ORDER — ASPIRIN 81 MG/1
324 TABLET, CHEWABLE ORAL ONCE
Status: DISCONTINUED | OUTPATIENT
Start: 2022-08-25 | End: 2022-08-25

## 2022-08-25 RX ORDER — PHENYLEPHRINE HCL IN 0.9% NACL 1 MG/10 ML
SYRINGE (ML) INTRAVENOUS AS NEEDED
Status: DISCONTINUED | OUTPATIENT
Start: 2022-08-25 | End: 2022-08-25 | Stop reason: HOSPADM

## 2022-08-25 RX ORDER — FENTANYL CITRATE 50 UG/ML
50 INJECTION, SOLUTION INTRAMUSCULAR; INTRAVENOUS
Status: DISCONTINUED | OUTPATIENT
Start: 2022-08-25 | End: 2022-08-26

## 2022-08-25 RX ORDER — ETOMIDATE 2 MG/ML
INJECTION INTRAVENOUS AS NEEDED
Status: DISCONTINUED | OUTPATIENT
Start: 2022-08-25 | End: 2022-08-25 | Stop reason: HOSPADM

## 2022-08-25 RX ORDER — BUDESONIDE AND FORMOTEROL FUMARATE DIHYDRATE 160; 4.5 UG/1; UG/1
2 AEROSOL RESPIRATORY (INHALATION)
Status: DISCONTINUED | OUTPATIENT
Start: 2022-08-25 | End: 2022-08-25

## 2022-08-25 RX ORDER — ASPIRIN 81 MG/1
81 TABLET, CHEWABLE ORAL DAILY
Status: DISCONTINUED | OUTPATIENT
Start: 2022-08-26 | End: 2022-08-26

## 2022-08-25 RX ORDER — POTASSIUM CHLORIDE 7.45 MG/ML
10 INJECTION INTRAVENOUS
Status: COMPLETED | OUTPATIENT
Start: 2022-08-25 | End: 2022-08-25

## 2022-08-25 RX ORDER — ACETAMINOPHEN 10 MG/ML
1000 INJECTION, SOLUTION INTRAVENOUS ONCE
Status: COMPLETED | OUTPATIENT
Start: 2022-08-25 | End: 2022-08-25

## 2022-08-25 RX ORDER — SODIUM CHLORIDE 9 MG/ML
100 INJECTION, SOLUTION INTRAVENOUS CONTINUOUS
Status: ACTIVE | OUTPATIENT
Start: 2022-08-25 | End: 2022-08-25

## 2022-08-25 RX ORDER — ASPIRIN 81 MG/1
81 TABLET ORAL DAILY
Status: DISCONTINUED | OUTPATIENT
Start: 2022-08-26 | End: 2022-08-25

## 2022-08-25 RX ORDER — NICOTINE 21 MG/24HR
1 PATCH, TRANSDERMAL 24 HOURS TRANSDERMAL EVERY 24 HOURS
Status: DISCONTINUED | OUTPATIENT
Start: 2022-08-25 | End: 2022-08-28 | Stop reason: HOSPADM

## 2022-08-25 RX ADMIN — POTASSIUM CHLORIDE 10 MEQ: 7.46 INJECTION, SOLUTION INTRAVENOUS at 19:57

## 2022-08-25 RX ADMIN — ASPIRIN 81 MG CHEWABLE TABLET 324 MG: 81 TABLET CHEWABLE at 16:51

## 2022-08-25 RX ADMIN — POTASSIUM CHLORIDE 10 MEQ: 7.46 INJECTION, SOLUTION INTRAVENOUS at 22:27

## 2022-08-25 RX ADMIN — AMIODARONE HYDROCHLORIDE 1 MG/MIN: 1.8 INJECTION, SOLUTION INTRAVENOUS at 23:53

## 2022-08-25 RX ADMIN — HEPARIN SODIUM 6240 UNITS: 5000 INJECTION INTRAVENOUS; SUBCUTANEOUS at 16:51

## 2022-08-25 RX ADMIN — SODIUM CHLORIDE 100 ML/HR: 9 INJECTION, SOLUTION INTRAVENOUS at 20:43

## 2022-08-25 RX ADMIN — LOSARTAN POTASSIUM 100 MG: 50 TABLET, FILM COATED ORAL at 20:20

## 2022-08-25 RX ADMIN — POTASSIUM CHLORIDE 10 MEQ: 7.46 INJECTION, SOLUTION INTRAVENOUS at 21:05

## 2022-08-25 RX ADMIN — ACETAMINOPHEN 1000 MG: 10 INJECTION INTRAVENOUS at 22:41

## 2022-08-25 RX ADMIN — Medication 0.02 MCG/KG/MIN: at 17:02

## 2022-08-25 RX ADMIN — ATORVASTATIN CALCIUM 40 MG: 10 TABLET, FILM COATED ORAL at 16:49

## 2022-08-25 RX ADMIN — SODIUM CHLORIDE 1000 ML: 9 INJECTION, SOLUTION INTRAVENOUS at 17:02

## 2022-08-25 RX ADMIN — Medication 0.02 MCG/KG/MIN: at 22:10

## 2022-08-25 RX ADMIN — DEXMEDETOMIDINE HYDROCHLORIDE 0.4 MCG/KG/HR: 400 INJECTION, SOLUTION INTRAVENOUS at 19:48

## 2022-08-25 RX ADMIN — BISOPROLOL FUMARATE 5 MG: 5 TABLET ORAL at 20:20

## 2022-08-25 RX ADMIN — SODIUM CHLORIDE 1000 ML: 9 INJECTION, SOLUTION INTRAVENOUS at 16:50

## 2022-08-25 RX ADMIN — FENTANYL CITRATE 50 MCG: 50 INJECTION, SOLUTION INTRAMUSCULAR; INTRAVENOUS at 19:58

## 2022-08-25 RX ADMIN — TICAGRELOR 180 MG: 90 TABLET ORAL at 20:11

## 2022-08-25 RX ADMIN — CANGRELOR 4 MCG/KG/MIN: 50 INJECTION, POWDER, LYOPHILIZED, FOR SOLUTION INTRAVENOUS at 20:38

## 2022-08-25 RX ADMIN — FENTANYL CITRATE 50 MCG: 50 INJECTION, SOLUTION INTRAMUSCULAR; INTRAVENOUS at 21:25

## 2022-08-26 ENCOUNTER — APPOINTMENT (OUTPATIENT)
Dept: CARDIOLOGY | Facility: HOSPITAL | Age: 57
End: 2022-08-26

## 2022-08-26 DIAGNOSIS — J30.9 ALLERGIC RHINITIS, UNSPECIFIED SEASONALITY, UNSPECIFIED TRIGGER: ICD-10-CM

## 2022-08-26 LAB
ALBUMIN SERPL-MCNC: 3.6 G/DL (ref 3.5–5.2)
ALBUMIN/GLOB SERPL: 1.4 G/DL
ALP SERPL-CCNC: 67 U/L (ref 39–117)
ALT SERPL W P-5'-P-CCNC: 35 U/L (ref 1–33)
ANION GAP SERPL CALCULATED.3IONS-SCNC: 10.5 MMOL/L (ref 5–15)
AST SERPL-CCNC: 89 U/L (ref 1–32)
BASE EXCESS BLDA CALC-SCNC: -11.8 MMOL/L (ref -2–2)
BASOPHILS # BLD AUTO: 0.04 10*3/MM3 (ref 0–0.2)
BASOPHILS NFR BLD AUTO: 0.2 % (ref 0–1.5)
BDY SITE: ABNORMAL
BILIRUB SERPL-MCNC: 0.2 MG/DL (ref 0–1.2)
BUN SERPL-MCNC: 13 MG/DL (ref 6–20)
BUN/CREAT SERPL: 15.3 (ref 7–25)
CALCIUM SPEC-SCNC: 7.7 MG/DL (ref 8.6–10.5)
CHLORIDE SERPL-SCNC: 108 MMOL/L (ref 98–107)
CHOLEST SERPL-MCNC: 190 MG/DL (ref 0–200)
CK MB SERPL-CCNC: 196.5 NG/ML
CK SERPL-CCNC: 1041 U/L (ref 20–180)
CO2 SERPL-SCNC: 18.5 MMOL/L (ref 22–29)
COHGB MFR BLD: 4.7 % (ref 0–1.5)
CREAT SERPL-MCNC: 0.85 MG/DL (ref 0.57–1)
DEPRECATED RDW RBC AUTO: 49 FL (ref 37–54)
EGFRCR SERPLBLD CKD-EPI 2021: 80 ML/MIN/1.73
EOSINOPHIL # BLD AUTO: 0.01 10*3/MM3 (ref 0–0.4)
EOSINOPHIL NFR BLD AUTO: 0.1 % (ref 0.3–6.2)
ERYTHROCYTE [DISTWIDTH] IN BLOOD BY AUTOMATED COUNT: 14.1 % (ref 12.3–15.4)
FHHB: 1.4 % (ref 0–5)
GLOBULIN UR ELPH-MCNC: 2.6 GM/DL
GLUCOSE SERPL-MCNC: 143 MG/DL (ref 65–99)
HBA1C MFR BLD: 6.4 % (ref 4.8–5.6)
HCO3 BLDA-SCNC: 17.4 MMOL/L (ref 22–26)
HCT VFR BLD AUTO: 39.9 % (ref 34–46.6)
HDLC SERPL-MCNC: 35 MG/DL (ref 40–60)
HGB BLD-MCNC: 13 G/DL (ref 12–15.9)
HGB BLDA-MCNC: 14.5 G/DL (ref 11.7–14.6)
HOLD SPECIMEN: NORMAL
IMM GRANULOCYTES # BLD AUTO: 0.13 10*3/MM3 (ref 0–0.05)
IMM GRANULOCYTES NFR BLD AUTO: 0.7 % (ref 0–0.5)
LACTATE BLDA-SCNC: 2.08 MMOL/L (ref 0.5–2)
LDLC SERPL CALC-MCNC: 127 MG/DL (ref 0–100)
LDLC/HDLC SERPL: 3.54 {RATIO}
LYMPHOCYTES # BLD AUTO: 2.14 10*3/MM3 (ref 0.7–3.1)
LYMPHOCYTES NFR BLD AUTO: 11.5 % (ref 19.6–45.3)
MAGNESIUM SERPL-MCNC: 1.6 MG/DL (ref 1.6–2.6)
MCH RBC QN AUTO: 30.9 PG (ref 26.6–33)
MCHC RBC AUTO-ENTMCNC: 32.6 G/DL (ref 31.5–35.7)
MCV RBC AUTO: 94.8 FL (ref 79–97)
METHGB BLD QL: 0 % (ref 0–1.5)
MODALITY: ABNORMAL
MONOCYTES # BLD AUTO: 1.1 10*3/MM3 (ref 0.1–0.9)
MONOCYTES NFR BLD AUTO: 5.9 % (ref 5–12)
NEUTROPHILS NFR BLD AUTO: 15.13 10*3/MM3 (ref 1.7–7)
NEUTROPHILS NFR BLD AUTO: 81.6 % (ref 42.7–76)
NRBC BLD AUTO-RTO: 0 /100 WBC (ref 0–0.2)
OXYHGB MFR BLDV: 93.9 % (ref 94–99)
PCO2 BLDA: 52.5 MM HG (ref 35–45)
PH BLDA: 7.14 PH UNITS (ref 7.35–7.45)
PHOSPHATE SERPL-MCNC: 3.2 MG/DL (ref 2.5–4.5)
PLATELET # BLD AUTO: 279 10*3/MM3 (ref 140–450)
PMV BLD AUTO: 10.6 FL (ref 6–12)
PO2 BLDA: 278.4 MM HG (ref 80–100)
POTASSIUM SERPL-SCNC: 4.6 MMOL/L (ref 3.5–5.2)
PROT SERPL-MCNC: 6.2 G/DL (ref 6–8.5)
QT INTERVAL: 349 MS
QT INTERVAL: 367 MS
QT INTERVAL: 399 MS
QT INTERVAL: 448 MS
RBC # BLD AUTO: 4.21 10*6/MM3 (ref 3.77–5.28)
SAO2 % BLDCOA: 98.5 % (ref 95–99)
SODIUM SERPL-SCNC: 137 MMOL/L (ref 136–145)
TRIGL SERPL-MCNC: 156 MG/DL (ref 0–150)
TROPONIN T SERPL-MCNC: 2.17 NG/ML (ref 0–0.03)
TSH SERPL DL<=0.05 MIU/L-ACNC: 2.02 UIU/ML (ref 0.27–4.2)
VLDLC SERPL-MCNC: 28 MG/DL (ref 5–40)
WBC NRBC COR # BLD: 18.55 10*3/MM3 (ref 3.4–10.8)

## 2022-08-26 PROCEDURE — 0 MAGNESIUM SULFATE 4 GM/100ML SOLUTION: Performed by: PHYSICIAN ASSISTANT

## 2022-08-26 PROCEDURE — 83036 HEMOGLOBIN GLYCOSYLATED A1C: CPT | Performed by: INTERNAL MEDICINE

## 2022-08-26 PROCEDURE — 94799 UNLISTED PULMONARY SVC/PX: CPT

## 2022-08-26 PROCEDURE — 82550 ASSAY OF CK (CPK): CPT | Performed by: INTERNAL MEDICINE

## 2022-08-26 PROCEDURE — 25010000002 LORAZEPAM PER 2 MG

## 2022-08-26 PROCEDURE — 94640 AIRWAY INHALATION TREATMENT: CPT

## 2022-08-26 PROCEDURE — 84100 ASSAY OF PHOSPHORUS: CPT | Performed by: PHYSICIAN ASSISTANT

## 2022-08-26 PROCEDURE — 99233 SBSQ HOSP IP/OBS HIGH 50: CPT | Performed by: INTERNAL MEDICINE

## 2022-08-26 PROCEDURE — 82553 CREATINE MB FRACTION: CPT | Performed by: INTERNAL MEDICINE

## 2022-08-26 PROCEDURE — 84443 ASSAY THYROID STIM HORMONE: CPT | Performed by: INTERNAL MEDICINE

## 2022-08-26 PROCEDURE — 25010000002 HYDROMORPHONE 1 MG/ML SOLUTION: Performed by: INTERNAL MEDICINE

## 2022-08-26 PROCEDURE — 85025 COMPLETE CBC W/AUTO DIFF WBC: CPT | Performed by: INTERNAL MEDICINE

## 2022-08-26 PROCEDURE — 25010000002 FENTANYL CITRATE (PF) 50 MCG/ML SOLUTION: Performed by: INTERNAL MEDICINE

## 2022-08-26 PROCEDURE — 93005 ELECTROCARDIOGRAM TRACING: CPT | Performed by: INTERNAL MEDICINE

## 2022-08-26 PROCEDURE — 83735 ASSAY OF MAGNESIUM: CPT | Performed by: INTERNAL MEDICINE

## 2022-08-26 PROCEDURE — 25010000002 AMIODARONE IN DEXTROSE 5% 360-4.14 MG/200ML-% SOLUTION: Performed by: INTERNAL MEDICINE

## 2022-08-26 PROCEDURE — 80061 LIPID PANEL: CPT | Performed by: INTERNAL MEDICINE

## 2022-08-26 PROCEDURE — 80053 COMPREHEN METABOLIC PANEL: CPT | Performed by: INTERNAL MEDICINE

## 2022-08-26 PROCEDURE — 84484 ASSAY OF TROPONIN QUANT: CPT | Performed by: INTERNAL MEDICINE

## 2022-08-26 RX ORDER — LORAZEPAM 2 MG/ML
2 INJECTION INTRAMUSCULAR ONCE
Status: DISCONTINUED | OUTPATIENT
Start: 2022-08-26 | End: 2022-08-28 | Stop reason: HOSPADM

## 2022-08-26 RX ORDER — POLYETHYLENE GLYCOL 3350 17 G/17G
17 POWDER, FOR SOLUTION ORAL DAILY PRN
Status: DISCONTINUED | OUTPATIENT
Start: 2022-08-26 | End: 2022-08-28 | Stop reason: HOSPADM

## 2022-08-26 RX ORDER — AMOXICILLIN 250 MG
2 CAPSULE ORAL 2 TIMES DAILY
Status: DISCONTINUED | OUTPATIENT
Start: 2022-08-26 | End: 2022-08-28 | Stop reason: HOSPADM

## 2022-08-26 RX ORDER — LORATADINE 10 MG/1
TABLET ORAL
Qty: 90 TABLET | Refills: 1 | Status: SHIPPED | OUTPATIENT
Start: 2022-08-26 | End: 2022-11-30 | Stop reason: SDUPTHER

## 2022-08-26 RX ORDER — ATORVASTATIN CALCIUM 40 MG/1
40 TABLET, FILM COATED ORAL NIGHTLY
Status: DISCONTINUED | OUTPATIENT
Start: 2022-08-26 | End: 2022-08-28 | Stop reason: HOSPADM

## 2022-08-26 RX ORDER — CETIRIZINE HYDROCHLORIDE 10 MG/1
10 TABLET ORAL DAILY
Refills: 1 | Status: DISCONTINUED | OUTPATIENT
Start: 2022-08-26 | End: 2022-08-28 | Stop reason: HOSPADM

## 2022-08-26 RX ORDER — OXYCODONE HYDROCHLORIDE 15 MG/1
15 TABLET, FILM COATED, EXTENDED RELEASE ORAL EVERY 12 HOURS SCHEDULED
Status: DISCONTINUED | OUTPATIENT
Start: 2022-08-26 | End: 2022-08-28 | Stop reason: HOSPADM

## 2022-08-26 RX ORDER — ASPIRIN 81 MG/1
81 TABLET, CHEWABLE ORAL DAILY
Status: DISCONTINUED | OUTPATIENT
Start: 2022-08-26 | End: 2022-08-28 | Stop reason: HOSPADM

## 2022-08-26 RX ORDER — LORAZEPAM 2 MG/ML
INJECTION INTRAMUSCULAR
Status: COMPLETED
Start: 2022-08-26 | End: 2022-08-26

## 2022-08-26 RX ORDER — MAGNESIUM SULFATE HEPTAHYDRATE 40 MG/ML
4 INJECTION, SOLUTION INTRAVENOUS ONCE
Status: COMPLETED | OUTPATIENT
Start: 2022-08-26 | End: 2022-08-26

## 2022-08-26 RX ORDER — BISOPROLOL FUMARATE 5 MG/1
2.5 TABLET, FILM COATED ORAL
Status: DISCONTINUED | OUTPATIENT
Start: 2022-08-27 | End: 2022-08-26

## 2022-08-26 RX ORDER — ALUMINA, MAGNESIA, AND SIMETHICONE 2400; 2400; 240 MG/30ML; MG/30ML; MG/30ML
15 SUSPENSION ORAL EVERY 6 HOURS PRN
Status: DISCONTINUED | OUTPATIENT
Start: 2022-08-26 | End: 2022-08-28 | Stop reason: HOSPADM

## 2022-08-26 RX ORDER — BISOPROLOL FUMARATE 5 MG/1
2.5 TABLET, FILM COATED ORAL
Status: DISCONTINUED | OUTPATIENT
Start: 2022-08-27 | End: 2022-08-28 | Stop reason: HOSPADM

## 2022-08-26 RX ORDER — ALBUTEROL SULFATE 0.63 MG/3ML
1 SOLUTION RESPIRATORY (INHALATION) EVERY 6 HOURS PRN
Status: DISCONTINUED | OUTPATIENT
Start: 2022-08-26 | End: 2022-08-28 | Stop reason: HOSPADM

## 2022-08-26 RX ORDER — BISOPROLOL FUMARATE 5 MG/1
2.5 TABLET, FILM COATED ORAL
Status: DISCONTINUED | OUTPATIENT
Start: 2022-08-26 | End: 2022-08-26

## 2022-08-26 RX ORDER — BISACODYL 10 MG
10 SUPPOSITORY, RECTAL RECTAL DAILY PRN
Status: DISCONTINUED | OUTPATIENT
Start: 2022-08-26 | End: 2022-08-28 | Stop reason: HOSPADM

## 2022-08-26 RX ORDER — ACETAMINOPHEN 325 MG/1
650 TABLET ORAL EVERY 4 HOURS PRN
Status: DISCONTINUED | OUTPATIENT
Start: 2022-08-26 | End: 2022-08-28 | Stop reason: HOSPADM

## 2022-08-26 RX ADMIN — ASPIRIN 81 MG CHEWABLE TABLET 81 MG: 81 TABLET CHEWABLE at 09:43

## 2022-08-26 RX ADMIN — IPRATROPIUM BROMIDE 0.5 MG: 0.5 SOLUTION RESPIRATORY (INHALATION) at 11:48

## 2022-08-26 RX ADMIN — FENTANYL CITRATE 50 MCG: 50 INJECTION, SOLUTION INTRAMUSCULAR; INTRAVENOUS at 06:43

## 2022-08-26 RX ADMIN — AMIODARONE HYDROCHLORIDE 0.5 MG/MIN: 1.8 INJECTION, SOLUTION INTRAVENOUS at 08:48

## 2022-08-26 RX ADMIN — SERTRALINE HYDROCHLORIDE 150 MG: 100 TABLET ORAL at 20:06

## 2022-08-26 RX ADMIN — BUDESONIDE 0.5 MG: 0.5 SUSPENSION RESPIRATORY (INHALATION) at 06:26

## 2022-08-26 RX ADMIN — ATORVASTATIN CALCIUM 40 MG: 40 TABLET, FILM COATED ORAL at 20:06

## 2022-08-26 RX ADMIN — HYDROMORPHONE HYDROCHLORIDE 0.1 MG: 1 INJECTION, SOLUTION INTRAMUSCULAR; INTRAVENOUS; SUBCUTANEOUS at 04:06

## 2022-08-26 RX ADMIN — CETIRIZINE HYDROCHLORIDE 10 MG: 10 TABLET, FILM COATED ORAL at 09:32

## 2022-08-26 RX ADMIN — SENNOSIDES AND DOCUSATE SODIUM 2 TABLET: 8.6; 5 TABLET ORAL at 20:05

## 2022-08-26 RX ADMIN — TICAGRELOR 90 MG: 90 TABLET ORAL at 09:33

## 2022-08-26 RX ADMIN — LORAZEPAM 2 MG: 2 INJECTION INTRAMUSCULAR; INTRAVENOUS at 09:47

## 2022-08-26 RX ADMIN — IPRATROPIUM BROMIDE 0.5 MG: 0.5 SOLUTION RESPIRATORY (INHALATION) at 18:55

## 2022-08-26 RX ADMIN — OXYCODONE HYDROCHLORIDE 15 MG: 15 TABLET, FILM COATED, EXTENDED RELEASE ORAL at 20:05

## 2022-08-26 RX ADMIN — NICOTINE 1 PATCH: 21 PATCH, EXTENDED RELEASE TRANSDERMAL at 09:34

## 2022-08-26 RX ADMIN — IPRATROPIUM BROMIDE 0.5 MG: 0.5 SOLUTION RESPIRATORY (INHALATION) at 06:26

## 2022-08-26 RX ADMIN — OXYCODONE HYDROCHLORIDE 15 MG: 15 TABLET, FILM COATED, EXTENDED RELEASE ORAL at 08:55

## 2022-08-26 RX ADMIN — ARFORMOTEROL TARTRATE 15 MCG: 15 SOLUTION RESPIRATORY (INHALATION) at 18:55

## 2022-08-26 RX ADMIN — SENNOSIDES AND DOCUSATE SODIUM 2 TABLET: 8.6; 5 TABLET ORAL at 09:33

## 2022-08-26 RX ADMIN — BUDESONIDE 0.5 MG: 0.5 SUSPENSION RESPIRATORY (INHALATION) at 18:54

## 2022-08-26 RX ADMIN — FENTANYL CITRATE 50 MCG: 50 INJECTION, SOLUTION INTRAMUSCULAR; INTRAVENOUS at 08:47

## 2022-08-26 RX ADMIN — ARFORMOTEROL TARTRATE 15 MCG: 15 SOLUTION RESPIRATORY (INHALATION) at 06:26

## 2022-08-26 RX ADMIN — PANTOPRAZOLE SODIUM 40 MG: 40 INJECTION, POWDER, FOR SOLUTION INTRAVENOUS at 05:09

## 2022-08-26 RX ADMIN — TICAGRELOR 90 MG: 90 TABLET ORAL at 20:06

## 2022-08-26 RX ADMIN — MAGNESIUM SULFATE 4 G: 4 INJECTION INTRAVENOUS at 06:30

## 2022-08-27 ENCOUNTER — APPOINTMENT (OUTPATIENT)
Dept: CARDIOLOGY | Facility: HOSPITAL | Age: 57
End: 2022-08-27

## 2022-08-27 LAB
ALBUMIN SERPL-MCNC: 3.5 G/DL (ref 3.5–5.2)
ALBUMIN/GLOB SERPL: 1.5 G/DL
ALP SERPL-CCNC: 63 U/L (ref 39–117)
ALT SERPL W P-5'-P-CCNC: 42 U/L (ref 1–33)
ANION GAP SERPL CALCULATED.3IONS-SCNC: 8.8 MMOL/L (ref 5–15)
AST SERPL-CCNC: 119 U/L (ref 1–32)
BILIRUB SERPL-MCNC: 0.4 MG/DL (ref 0–1.2)
BUN SERPL-MCNC: 9 MG/DL (ref 6–20)
BUN/CREAT SERPL: 15 (ref 7–25)
CALCIUM SPEC-SCNC: 8.6 MG/DL (ref 8.6–10.5)
CHLORIDE SERPL-SCNC: 109 MMOL/L (ref 98–107)
CK MB SERPL-CCNC: 63.96 NG/ML
CK SERPL-CCNC: 1049 U/L (ref 20–180)
CO2 SERPL-SCNC: 23.2 MMOL/L (ref 22–29)
CREAT SERPL-MCNC: 0.6 MG/DL (ref 0.57–1)
DEPRECATED RDW RBC AUTO: 48.1 FL (ref 37–54)
EGFRCR SERPLBLD CKD-EPI 2021: 104.8 ML/MIN/1.73
ERYTHROCYTE [DISTWIDTH] IN BLOOD BY AUTOMATED COUNT: 14.3 % (ref 12.3–15.4)
GLOBULIN UR ELPH-MCNC: 2.4 GM/DL
GLUCOSE SERPL-MCNC: 104 MG/DL (ref 65–99)
HCT VFR BLD AUTO: 35.7 % (ref 34–46.6)
HGB BLD-MCNC: 12 G/DL (ref 12–15.9)
MAGNESIUM SERPL-MCNC: 2 MG/DL (ref 1.6–2.6)
MCH RBC QN AUTO: 31.3 PG (ref 26.6–33)
MCHC RBC AUTO-ENTMCNC: 33.6 G/DL (ref 31.5–35.7)
MCV RBC AUTO: 93.2 FL (ref 79–97)
PLATELET # BLD AUTO: 208 10*3/MM3 (ref 140–450)
PMV BLD AUTO: 10.4 FL (ref 6–12)
POTASSIUM SERPL-SCNC: 3.4 MMOL/L (ref 3.5–5.2)
PROT SERPL-MCNC: 5.9 G/DL (ref 6–8.5)
RBC # BLD AUTO: 3.83 10*6/MM3 (ref 3.77–5.28)
SODIUM SERPL-SCNC: 141 MMOL/L (ref 136–145)
TROPONIN T SERPL-MCNC: 2.05 NG/ML (ref 0–0.03)
WBC NRBC COR # BLD: 12.1 10*3/MM3 (ref 3.4–10.8)

## 2022-08-27 PROCEDURE — 93306 TTE W/DOPPLER COMPLETE: CPT

## 2022-08-27 PROCEDURE — 80053 COMPREHEN METABOLIC PANEL: CPT | Performed by: INTERNAL MEDICINE

## 2022-08-27 PROCEDURE — 84484 ASSAY OF TROPONIN QUANT: CPT | Performed by: INTERNAL MEDICINE

## 2022-08-27 PROCEDURE — 82550 ASSAY OF CK (CPK): CPT | Performed by: INTERNAL MEDICINE

## 2022-08-27 PROCEDURE — 94799 UNLISTED PULMONARY SVC/PX: CPT

## 2022-08-27 PROCEDURE — 83735 ASSAY OF MAGNESIUM: CPT | Performed by: INTERNAL MEDICINE

## 2022-08-27 PROCEDURE — 99232 SBSQ HOSP IP/OBS MODERATE 35: CPT | Performed by: INTERNAL MEDICINE

## 2022-08-27 PROCEDURE — 85027 COMPLETE CBC AUTOMATED: CPT | Performed by: INTERNAL MEDICINE

## 2022-08-27 PROCEDURE — 82553 CREATINE MB FRACTION: CPT | Performed by: INTERNAL MEDICINE

## 2022-08-27 PROCEDURE — 93306 TTE W/DOPPLER COMPLETE: CPT | Performed by: INTERNAL MEDICINE

## 2022-08-27 RX ORDER — LOSARTAN POTASSIUM 25 MG/1
25 TABLET ORAL
Status: DISCONTINUED | OUTPATIENT
Start: 2022-08-27 | End: 2022-08-28 | Stop reason: HOSPADM

## 2022-08-27 RX ADMIN — CETIRIZINE HYDROCHLORIDE 10 MG: 10 TABLET, FILM COATED ORAL at 08:08

## 2022-08-27 RX ADMIN — ASPIRIN 81 MG CHEWABLE TABLET 81 MG: 81 TABLET CHEWABLE at 08:08

## 2022-08-27 RX ADMIN — PANTOPRAZOLE SODIUM 40 MG: 40 INJECTION, POWDER, FOR SOLUTION INTRAVENOUS at 05:55

## 2022-08-27 RX ADMIN — SENNOSIDES AND DOCUSATE SODIUM 2 TABLET: 8.6; 5 TABLET ORAL at 20:57

## 2022-08-27 RX ADMIN — LOSARTAN POTASSIUM 25 MG: 25 TABLET, FILM COATED ORAL at 13:07

## 2022-08-27 RX ADMIN — ATORVASTATIN CALCIUM 40 MG: 40 TABLET, FILM COATED ORAL at 20:57

## 2022-08-27 RX ADMIN — SENNOSIDES AND DOCUSATE SODIUM 2 TABLET: 8.6; 5 TABLET ORAL at 08:08

## 2022-08-27 RX ADMIN — SERTRALINE HYDROCHLORIDE 150 MG: 100 TABLET ORAL at 20:56

## 2022-08-27 RX ADMIN — TICAGRELOR 90 MG: 90 TABLET ORAL at 20:57

## 2022-08-27 RX ADMIN — OXYCODONE HYDROCHLORIDE 15 MG: 15 TABLET, FILM COATED, EXTENDED RELEASE ORAL at 08:08

## 2022-08-27 RX ADMIN — NYSTATIN 500000 UNITS: 100000 SUSPENSION ORAL at 20:57

## 2022-08-27 RX ADMIN — IPRATROPIUM BROMIDE 0.5 MG: 0.5 SOLUTION RESPIRATORY (INHALATION) at 00:08

## 2022-08-27 RX ADMIN — OXYCODONE HYDROCHLORIDE 15 MG: 15 TABLET, FILM COATED, EXTENDED RELEASE ORAL at 20:57

## 2022-08-27 RX ADMIN — BISOPROLOL FUMARATE 2.5 MG: 5 TABLET ORAL at 08:45

## 2022-08-27 RX ADMIN — TICAGRELOR 90 MG: 90 TABLET ORAL at 08:08

## 2022-08-27 RX ADMIN — NICOTINE 1 PATCH: 21 PATCH, EXTENDED RELEASE TRANSDERMAL at 08:09

## 2022-08-28 ENCOUNTER — READMISSION MANAGEMENT (OUTPATIENT)
Dept: CALL CENTER | Facility: HOSPITAL | Age: 57
End: 2022-08-28

## 2022-08-28 VITALS
DIASTOLIC BLOOD PRESSURE: 70 MMHG | BODY MASS INDEX: 41.4 KG/M2 | OXYGEN SATURATION: 95 % | RESPIRATION RATE: 16 BRPM | HEIGHT: 64 IN | SYSTOLIC BLOOD PRESSURE: 100 MMHG | WEIGHT: 242.51 LBS | TEMPERATURE: 98.2 F | HEART RATE: 61 BPM

## 2022-08-28 LAB
BH CV ECHO MEAS - AO ROOT DIAM: 3.2 CM
BH CV ECHO MEAS - EDV(MOD-SP2): 62 ML
BH CV ECHO MEAS - EDV(MOD-SP4): 62 ML
BH CV ECHO MEAS - EF(MOD-BP): 68.3 %
BH CV ECHO MEAS - ESV(MOD-SP2): 21 ML
BH CV ECHO MEAS - ESV(MOD-SP4): 17 ML
BH CV ECHO MEAS - IVSD: 0.9 CM
BH CV ECHO MEAS - LA DIMENSION: 2.7 CM
BH CV ECHO MEAS - LAT PEAK E' VEL: 13.7 CM/SEC
BH CV ECHO MEAS - LVIDD: 4.4 CM
BH CV ECHO MEAS - LVIDS: 3 CM
BH CV ECHO MEAS - LVOT DIAM: 2 CM
BH CV ECHO MEAS - LVPWD: 0.9 CM
BH CV ECHO MEAS - MED PEAK E' VEL: 9.03 CM/SEC
BH CV ECHO MEAS - MV A MAX VEL: 42 CM/SEC
BH CV ECHO MEAS - MV DEC TIME: 178 MSEC
BH CV ECHO MEAS - MV E MAX VEL: 80 CM/SEC
BH CV ECHO MEAS - MV E/A: 1.9
BH CV ECHO MEAS - RVDD: 2.3 CM
BH CV ECHO MEAS - TAPSE (>1.6): 2.07 CM
BH CV ECHO MEASUREMENTS AVERAGE E/E' RATIO: 7.04
IVRT: 61 MSEC
LEFT ATRIUM VOLUME INDEX: 13.7 ML/M2
MAXIMAL PREDICTED HEART RATE: 163 BPM
STRESS TARGET HR: 139 BPM

## 2022-08-28 PROCEDURE — 99239 HOSP IP/OBS DSCHRG MGMT >30: CPT | Performed by: INTERNAL MEDICINE

## 2022-08-28 PROCEDURE — 94760 N-INVAS EAR/PLS OXIMETRY 1: CPT

## 2022-08-28 PROCEDURE — 94799 UNLISTED PULMONARY SVC/PX: CPT

## 2022-08-28 RX ORDER — SODIUM CHLORIDE 0.9 % (FLUSH) 0.9 %
10 SYRINGE (ML) INJECTION EVERY 12 HOURS SCHEDULED
Status: DISCONTINUED | OUTPATIENT
Start: 2022-08-28 | End: 2022-08-28 | Stop reason: HOSPADM

## 2022-08-28 RX ORDER — ATORVASTATIN CALCIUM 40 MG/1
40 TABLET, FILM COATED ORAL NIGHTLY
Qty: 90 TABLET | Refills: 1 | Status: SHIPPED | OUTPATIENT
Start: 2022-08-28 | End: 2023-03-29

## 2022-08-28 RX ORDER — LOSARTAN POTASSIUM 25 MG/1
25 TABLET ORAL
Qty: 90 TABLET | Refills: 1 | Status: SHIPPED | OUTPATIENT
Start: 2022-08-29 | End: 2022-09-12

## 2022-08-28 RX ORDER — ASPIRIN 81 MG/1
81 TABLET, CHEWABLE ORAL DAILY
Qty: 99 TABLET | Refills: 99 | Status: SHIPPED | OUTPATIENT
Start: 2022-08-29

## 2022-08-28 RX ORDER — BISOPROLOL FUMARATE 5 MG/1
2.5 TABLET, FILM COATED ORAL
Qty: 90 TABLET | Refills: 1 | Status: SHIPPED | OUTPATIENT
Start: 2022-08-29 | End: 2022-12-12 | Stop reason: SDUPTHER

## 2022-08-28 RX ORDER — SODIUM CHLORIDE 9 MG/ML
40 INJECTION, SOLUTION INTRAVENOUS AS NEEDED
Status: DISCONTINUED | OUTPATIENT
Start: 2022-08-28 | End: 2022-08-28 | Stop reason: HOSPADM

## 2022-08-28 RX ORDER — SODIUM CHLORIDE 0.9 % (FLUSH) 0.9 %
10 SYRINGE (ML) INJECTION AS NEEDED
Status: DISCONTINUED | OUTPATIENT
Start: 2022-08-28 | End: 2022-08-28 | Stop reason: HOSPADM

## 2022-08-28 RX ADMIN — TICAGRELOR 90 MG: 90 TABLET ORAL at 08:45

## 2022-08-28 RX ADMIN — NYSTATIN 500000 UNITS: 100000 SUSPENSION ORAL at 12:19

## 2022-08-28 RX ADMIN — BISOPROLOL FUMARATE 2.5 MG: 5 TABLET ORAL at 08:45

## 2022-08-28 RX ADMIN — SENNOSIDES AND DOCUSATE SODIUM 2 TABLET: 8.6; 5 TABLET ORAL at 08:45

## 2022-08-28 RX ADMIN — NYSTATIN 500000 UNITS: 100000 SUSPENSION ORAL at 08:44

## 2022-08-28 RX ADMIN — NICOTINE 1 PATCH: 21 PATCH, EXTENDED RELEASE TRANSDERMAL at 08:47

## 2022-08-28 RX ADMIN — PANTOPRAZOLE SODIUM 40 MG: 40 INJECTION, POWDER, FOR SOLUTION INTRAVENOUS at 05:24

## 2022-08-28 RX ADMIN — IPRATROPIUM BROMIDE 0.5 MG: 0.5 SOLUTION RESPIRATORY (INHALATION) at 01:35

## 2022-08-28 RX ADMIN — ASPIRIN 81 MG CHEWABLE TABLET 81 MG: 81 TABLET CHEWABLE at 08:45

## 2022-08-28 RX ADMIN — IPRATROPIUM BROMIDE 0.5 MG: 0.5 SOLUTION RESPIRATORY (INHALATION) at 08:10

## 2022-08-28 RX ADMIN — CETIRIZINE HYDROCHLORIDE 10 MG: 10 TABLET, FILM COATED ORAL at 08:45

## 2022-08-28 RX ADMIN — OXYCODONE HYDROCHLORIDE 15 MG: 15 TABLET, FILM COATED, EXTENDED RELEASE ORAL at 08:45

## 2022-08-28 RX ADMIN — ARFORMOTEROL TARTRATE 15 MCG: 15 SOLUTION RESPIRATORY (INHALATION) at 08:10

## 2022-08-28 RX ADMIN — BUDESONIDE 0.5 MG: 0.5 SUSPENSION RESPIRATORY (INHALATION) at 08:10

## 2022-08-28 RX ADMIN — LOSARTAN POTASSIUM 25 MG: 25 TABLET, FILM COATED ORAL at 08:45

## 2022-08-28 NOTE — OUTREACH NOTE
Prep Survey    Flowsheet Row Responses   [facility] facility patient discharged from? [facility]   Is LACE score < 7 ? No   Emergency Room discharge w/ pulse ox? No   Eligibility TCM   H[facility]   Date of Admission 08/25/22   Date of Discharge 08/28/22   Discharge Disposition Home or Self Care   Discharge diagnosis Acute STEMI, heart cath - stent & thrombectomy, Ventricular fib, Cardiomyopathy   Does the patient have one of the following disease processes/diagnoses(primary or secondary)? Acute MI (STEMI,NSTEMI)   Does the patient have Home health ordered? No   Is there a DME ordered? No   Prep survey completed? Yes          YOLANDA CASAS - Registered Nurse

## 2022-08-29 ENCOUNTER — TRANSITIONAL CARE MANAGEMENT TELEPHONE ENCOUNTER (OUTPATIENT)
Dept: CALL CENTER | Facility: HOSPITAL | Age: 57
End: 2022-08-29

## 2022-08-29 NOTE — OUTREACH NOTE
Call Center TCM Note    Flowsheet Row Responses   [facility] patient discharged from? [facility]   Does the patient have one of the following disease processes/diagnoses(primary or secondary)? Acute MI (STEMI,NSTEMI)   TCM attempt successful? No   Unsuccessful attempts Attempt 1          Sarah Galindo RN    8/29/2022, 09:35 EDT

## 2022-08-29 NOTE — OUTREACH NOTE
Call Center TCM Note    Flowsheet Row Responses   [facility] patient discharged from? [facility]   Does the patient have one of the following disease processes/diagnoses(primary or secondary)? Acute MI (STEMI,NSTEMI)   TCM attempt successful? Yes   Call start time 1300   Call end time 1301   Discharge diagnosis Acute STEMI, heart cath - stent & thrombectomy, Ventricular fib, Cardiomyopathy   Does the patient have all prescriptions related to this admission filled (includes statins,anticoagulants,HTN meds,anti-arrhythmia meds) Yes   Is the patient taking all medications as directed (includes completed medication regime)? Yes   Does the patient have a primary care provider?  Yes   Does the patient have an appointment with their PCP,cardiologist,or clinic within 7 days of discharge? Yes   Comments regarding PCP hospital f/u with PCP tomorrow (8/30)   Has the patient kept scheduled appointments due by today? N/A   Has home health visited the patient within 72 hours of discharge? N/A   Psychosocial issues? No   Did the patient receive a copy of their discharge instructions? Yes   What is the patient's perception of their health status since discharge? Improving   Is the patient/caregiver able to teach back ways to prevent a second heart attack: Take medications, Follow up with MD   Is the patient/caregiver able to teach back the hierarchy of who to call/visit for symptoms/problems? PCP, Specialist, Home health nurse, Urgent Care, ED, 911 Yes   TCM call completed? Yes   Wrap up additional comments Doing well, no questions, confirmed appt with PCP for tomorrow (8/30).          Sarah Galindo RN    8/29/2022, 13:01 EDT

## 2022-08-30 ENCOUNTER — OFFICE VISIT (OUTPATIENT)
Dept: FAMILY MEDICINE CLINIC | Facility: CLINIC | Age: 57
End: 2022-08-30

## 2022-08-30 VITALS
WEIGHT: 230 LBS | OXYGEN SATURATION: 97 % | SYSTOLIC BLOOD PRESSURE: 89 MMHG | HEART RATE: 61 BPM | BODY MASS INDEX: 39.27 KG/M2 | HEIGHT: 64 IN | DIASTOLIC BLOOD PRESSURE: 62 MMHG

## 2022-08-30 DIAGNOSIS — I49.01 VENTRICULAR FIBRILLATION: ICD-10-CM

## 2022-08-30 DIAGNOSIS — I25.5 ISCHEMIC CARDIOMYOPATHY: ICD-10-CM

## 2022-08-30 DIAGNOSIS — N39.0 URINARY TRACT INFECTION WITHOUT HEMATURIA, SITE UNSPECIFIED: ICD-10-CM

## 2022-08-30 DIAGNOSIS — I21.11 ACUTE ST ELEVATION MYOCARDIAL INFARCTION (STEMI) INVOLVING RIGHT CORONARY ARTERY: Primary | Chronic | ICD-10-CM

## 2022-08-30 LAB
BILIRUB BLD-MCNC: ABNORMAL MG/DL
CLARITY, POC: ABNORMAL
COLOR UR: ABNORMAL
EXPIRATION DATE: ABNORMAL
GLUCOSE UR STRIP-MCNC: NEGATIVE MG/DL
KETONES UR QL: ABNORMAL
LEUKOCYTE EST, POC: NEGATIVE
Lab: ABNORMAL
NITRITE UR-MCNC: NEGATIVE MG/ML
PH UR: 7 [PH] (ref 5–8)
PROT UR STRIP-MCNC: ABNORMAL MG/DL
RBC # UR STRIP: ABNORMAL /UL
SP GR UR: 1.02 (ref 1–1.03)
UROBILINOGEN UR QL: NORMAL

## 2022-08-30 PROCEDURE — 99213 OFFICE O/P EST LOW 20 MIN: CPT | Performed by: PHYSICIAN ASSISTANT

## 2022-08-30 NOTE — PROGRESS NOTES
Transitional Care Follow Up Visit  Subjective     Lindsey Mckinnon is a 57 y.o. female who presents for a transitional care management visit.    Within 48 business hours after discharge our office contacted her via telephone to coordinate her care and needs.      I reviewed and discussed the details of that call along with the discharge summary, hospital problems, inpatient lab results, inpatient diagnostic studies, and consultation reports with Lindsey.  Pt was inpatient from 8/25/22-8/28/22 due to Acute STEMI, heart cath - stent & thrombectomy, Ventricular fib, Cardiomyopathy.  Pt presented to ER with hypotension, intermittent bradycardia and was taken for emergency cardiac cath. She had a large dominant RCA which was completely occluded in the midsegment.  She had mechanical aspiration thrombectomy and PCI of the right coronary artery with a 5.0X 23 drug-eluting stent with good angiographic results. She had reperfusion ventricular fibrillation and AV block requiring multiple shocks.  She required intubation at the time of her PCI.  She was transferred to the ICU after her procedure.  She was able to be extubated at night.    Pt states she is feeling better. Pt states she is soa, mainly when she is active. Denies any cp.  Pt states she has f/u with cardio-[facility] on 9/13/22.    Pt states she would like to be checked for uti, pt was being treated on 8/20/22 but she feels it is still going on.     Current outpatient and discharge medications have been reconciled for the patient.  Reviewed by: BULL Langford      Date of TCM Phone Call 8/28/2022   H[facility]   Date of Admission 8/25/2022   Date of Discharge 8/28/2022   Discharge Disposition Home or Self Care     Risk for Readmission (LACE) Score: 11 (8/28/2022  6:00 AM)    History of Present Illness     Course During Hospital Stay:  Pt had 100% RCA occulusion in midsegment region.  She is on blood thinner and ASA.    No CP, SOA, HA    Some fatigue     The  following portions of the patient's history were reviewed and updated as appropriate: allergies, current medications, past family history, past medical history, past social history, past surgical history and problem list.    Objective   Physical Exam  Vitals and nursing note reviewed.   Constitutional:       Appearance: Normal appearance. She is obese.   HENT:      Head: Normocephalic and atraumatic.   Cardiovascular:      Rate and Rhythm: Normal rate and regular rhythm.      Heart sounds: Normal heart sounds.   Pulmonary:      Effort: Pulmonary effort is normal.      Breath sounds: Normal breath sounds.   Musculoskeletal:      Cervical back: Neck supple.   Neurological:      Mental Status: She is alert.   Psychiatric:         Mood and Affect: Mood normal.         Behavior: Behavior normal.       Assessment & Plan   Diagnoses and all orders for this visit:    1. Acute ST elevation myocardial infarction (STEMI) involving right coronary artery (HCC) (Primary)  Comments:  Stable condition -Continue blood thinners, seeing cardio    2. Urinary tract infection without hematuria, site unspecified  -     POCT urinalysis dipstick, automated    3. Ischemic cardiomyopathy  Comments:  Impaired function due to AMI - Continue Cardio - for repeat ECHO      4. Ventricular fibrillation (HCC)  Comments:  Resolved since cardiac cath       I have reviewed information obtained and documented by others and I have confirmed the accuracy of this documented note.    BULL Langford

## 2022-09-07 ENCOUNTER — READMISSION MANAGEMENT (OUTPATIENT)
Dept: CALL CENTER | Facility: HOSPITAL | Age: 57
End: 2022-09-07

## 2022-09-07 NOTE — OUTREACH NOTE
AMI Week 2 Survey    Flowsheet Row Responses   [facility] facility patient discharged from? [facility]   Does the patient have one of the following disease processes/diagnoses(primary or secondary)? Acute MI (STEMI,NSTEMI)   Week 2 attempt successful? No   Unsuccessful attempts Attempt 1          TERE CULLEN - Registered Nurse

## 2022-09-09 ENCOUNTER — READMISSION MANAGEMENT (OUTPATIENT)
Dept: CALL CENTER | Facility: HOSPITAL | Age: 57
End: 2022-09-09

## 2022-09-09 NOTE — OUTREACH NOTE
AMI Week 2 Survey    Flowsheet Row Responses   [facility] facility patient discharged from? [facility]   Does the patient have one of the following disease processes/diagnoses(primary or secondary)? Acute MI (STEMI,NSTEMI)   Week 2 attempt successful? No   Unsuccessful attempts Attempt 2          LANRE WALTERS - Registered Nurse

## 2022-09-12 ENCOUNTER — OFFICE VISIT (OUTPATIENT)
Dept: CARDIOLOGY | Facility: CLINIC | Age: 57
End: 2022-09-12

## 2022-09-12 VITALS
BODY MASS INDEX: 38.24 KG/M2 | HEIGHT: 64 IN | SYSTOLIC BLOOD PRESSURE: 148 MMHG | WEIGHT: 224 LBS | HEART RATE: 67 BPM | DIASTOLIC BLOOD PRESSURE: 82 MMHG

## 2022-09-12 DIAGNOSIS — I25.5 ISCHEMIC CARDIOMYOPATHY: ICD-10-CM

## 2022-09-12 DIAGNOSIS — I10 ESSENTIAL HYPERTENSION: ICD-10-CM

## 2022-09-12 DIAGNOSIS — E78.2 MIXED HYPERLIPIDEMIA: ICD-10-CM

## 2022-09-12 DIAGNOSIS — I25.2 HISTORY OF ST ELEVATION MYOCARDIAL INFARCTION (STEMI): ICD-10-CM

## 2022-09-12 DIAGNOSIS — I25.10 CORONARY ARTERY DISEASE INVOLVING NATIVE CORONARY ARTERY OF NATIVE HEART WITHOUT ANGINA PECTORIS: Primary | ICD-10-CM

## 2022-09-12 PROBLEM — I21.3 STEMI (ST ELEVATION MYOCARDIAL INFARCTION) (HCC): Status: RESOLVED | Noted: 2022-08-25 | Resolved: 2022-09-12

## 2022-09-12 PROBLEM — I21.11 ACUTE ST ELEVATION MYOCARDIAL INFARCTION (STEMI) INVOLVING RIGHT CORONARY ARTERY: Status: RESOLVED | Noted: 2022-08-25 | Resolved: 2022-09-12

## 2022-09-12 PROCEDURE — 99214 OFFICE O/P EST MOD 30 MIN: CPT | Performed by: INTERNAL MEDICINE

## 2022-09-12 RX ORDER — LOSARTAN POTASSIUM 25 MG/1
50 TABLET ORAL
Qty: 90 TABLET | Refills: 1
Start: 2022-09-12 | End: 2022-11-30 | Stop reason: SDUPTHER

## 2022-09-12 NOTE — PROGRESS NOTES
CARDIOLOGY FOLLOW-UP PROGRESS NOTE        Chief Complaint  Hyperlipidemia and Coronary Artery Disease (Recent myocardial infarction, follow-up from hospital stay)    Subjective            Lindsey Mckinnon presents to [facility] CARDIOLOGY  History of Present Illness      Ms. Mckinnon is here for follow-up from recent hospital stay.  She presented to the emergency room on 8/25/2020 because of chest pain of several hours duration.  EKGs done in the ER revealed acute inferior ST segment elevation myocardial infarction.  Patient developed bradycardia and hypotension in the ER and was emergently transferred to cardiac Cath Lab.  Cardiac catheterization showed 100% occlusion of the mid right coronary artery.  Other arteries are within normal limits.  Patient underwent primary angioplasty and stent placement to mid RCA.  During the procedure, patient had recurrent episodes of ventricular tachycardia/fibrillation which required several shocks.  She was intubated during the procedure.  She had relatively normal course during the rest of the hospital stay.  She was extubated same night.  LV ejection fraction 50% with echocardiogram.  Blood pressure remained on the lower side initially.  She was discharged home on the fourth hospital day without any complications.    Today patient here for first follow-up visit.  She has not had any further episodes of chest pain since discharge.  Occasionally if she feels some shortness of breath.  Denies any palpitations, dizziness or syncopal episodes.  Taking all the medication including Brilinta as prescribed.      Past History:    Coronary artery disease : Index presentation acute inferior STEMI on 8/25/2022, status post primary angioplasty and stent placement to mid right coronary artery.  Procedure was complicated by a recurrent ventricular tachycardia, intubation.    Essential hypertension  Mixed hyperlipidemia  Chronic obstructive pulm disease  Chronic pain  syndrome    Medical History:  Past Medical History:   Diagnosis Date   • Acute ST elevation myocardial infarction (STEMI) involving right coronary artery ([facility]) 2022   • Alcoholism ([facility])    • Anemia    • Anxiety    • Arthritis    • Bipolar disorder ([facility])    • Breast lump    • Cervical spine pain    • Chronic allergic rhinitis    • Chronic pain disorder    • COPD (chronic obstructive pulmonary disease) ([facility]) 04/15/2021   • Degenerative disc disease, cervical    • Depression    • Esophageal reflux    • Essential hypertension 04/15/2021   • Fibromyalgia    • Forgetfulness    • Gall stones    • GERD (gastroesophageal reflux disease)    • H/O emphysema    • Head injury    • Heart attack ([facility]) 2022   • Hemorrhoid    • Hernia cerebri ([facility])    • Hernia, hiatal    • Herniated disc, cervical    • Hyperlipidemia 04/15/2021   • Kidney stone    • Limb swelling    • Lumbar pain    • Migraine    • Mood disorder ([facility])    • Muscle cramps    • Nicotine dependence 04/15/2021   • Reflux esophagitis    • Shortness of breath    • Spinal stenosis    • STEMI (ST elevation myocardial infarction) ([facility]) 2022   • Tremor 04/15/2021       Surgical History: has a past surgical history that includes  section; Hysterectomy; Cervical Facetectomy;  section (,); Esophagogastroduodenoscopy; Gallbladder surgery (); Hysterectomy (); Lumbar epidural injection; Cholecystectomy; and Cardiac catheterization (N/A, 2022).     Family History: family history includes Arthritis in her brother, father, mother, and son; Bipolar disorder in her mother; Cancer in her father and mother; Depression in her brother and mother; Diabetes in her brother, father, and mother; Heart attack in her father; Heart disease in her father; Kidney cancer in her brother and father; Lung cancer in her father and mother; OCD in her brother and brother; Osteoporosis in her mother; Paranoid behavior in her brother.     Social History:  reports that she has been smoking cigarettes. She has been smoking about 0.25 packs per day. She has never used smokeless tobacco. She reports previous alcohol use. She reports previous drug use. Drug: Marijuana.    Allergies: Morphine and Sulfa antibiotics    Current Outpatient Medications on File Prior to Visit   Medication Sig   • aspirin 81 MG chewable tablet Chew 1 tablet Daily.   • atorvastatin (LIPITOR) 40 MG tablet Take 1 tablet by mouth Every Night.   • bisoprolol (ZEBeta) 5 MG tablet Take 0.5 tablets by mouth Daily.   • Fluticasone Furoate-Vilanterol (BREO ELLIPTA) 200-25 MCG/INH inhaler INHALE 1 PUFF BY MOUTH DAILY   • loratadine (CLARITIN) 10 MG tablet TAKE 1 TABLET BY MOUTH EVERY DAY   • meloxicam (MOBIC) 7.5 MG tablet Take 7.5 mg by mouth Daily As Needed.   • metaxalone (SKELAXIN) 800 MG tablet Take 800 mg by mouth 3 (Three) Times a Day As Needed.   • oxyCODONE ER (oxyCONTIN) 15 MG tablet extended-release 12 hour Take 15 mg by mouth Every 12 (Twelve) Hours.   • pantoprazole (PROTONIX) 40 MG EC tablet Take 1 tablet by mouth Daily.   • sertraline (ZOLOFT) 100 MG tablet Take 1.5 tablets by mouth Every Night for 60 days.   • Spiriva HandiHaler 18 MCG per inhalation capsule INHALE THE CONTENTS OF 1 CAPSULE VIA HANDIHALER ONCE DAILY (Patient taking differently: Place 1 capsule into inhaler and inhale Daily.)   • tamsulosin (FLOMAX) 0.4 MG capsule 24 hr capsule Take 1 capsule by mouth Daily for 360 days. (Patient taking differently: Take 1 capsule by mouth Daily As Needed.)   • ticagrelor (BRILINTA) 90 MG tablet tablet Take 1 tablet by mouth 2 (Two) Times a Day. Indications: Acute Coronary Syndrome   •  losartan (COZAAR) 25 MG tablet Take 1 tablet by mouth Daily.         Review of Systems   Respiratory: Positive for shortness of breath. Negative for cough and wheezing.    Cardiovascular: Negative for chest pain, palpitations and leg swelling.   Gastrointestinal: Negative for nausea and vomiting.  "  Musculoskeletal: Positive for back pain.   Neurological: Negative for dizziness and syncope.        Objective     /82   Pulse 67   Ht 162.6 cm (64\")   Wt 102 kg (224 lb)   BMI 38.45 kg/m²       Physical Exam    General : Alert, awake, no acute distress  Neck : Supple, no carotid bruit, no jugular venous distention  CVS : Regular rate and rhythm, no murmur, rubs or gallops  Lungs: Clear to auscultation bilaterally, no crackles or rhonchi  Abdomen: Soft, nontender, bowel sounds heard in all 4 quadrants  Extremities: Warm, well-perfused, no pedal edema    Result Review :     The following data was reviewed by: Adam Helms MD on 09/12/2022:    CMP    CMP 8/25/22 8/25/22 8/25/22 8/26/22 8/27/22 1824 1958 2129     Glucose 177 (A) 176 (A) 188 (A) 143 (A) 104 (A)   BUN 14 12 11 13 9   Creatinine 0.70 0.57 0.61 0.85 0.60   Sodium 140 141 139 137 141   Potassium 2.9 (A) 3.1 (A) 4.4 4.6 3.4 (A)   Chloride 110 (A) 110 (A) 110 (A) 108 (A) 109 (A)   Calcium 7.3 (A) 7.4 (A) 7.6 (A) 7.7 (A) 8.6   Albumin 2.80 (A) 3.70 3.60 3.60 3.50   Total Bilirubin <0.2 0.3 0.2 0.2 0.4   Alkaline Phosphatase 62 81 74 67 63   AST (SGOT) 24 37 (A) 50 (A) 89 (A) 119 (A)   ALT (SGPT) 21 28 29 35 (A) 42 (A)   (A) Abnormal value       Comments are available for some flowsheets but are not being displayed.           CBC    CBC 8/25/22 8/26/22 8/27/22   WBC 18.09 (A) 18.55 (A) 12.10 (A)   RBC 5.10 4.21 3.83   Hemoglobin 15.8 13.0 12.0   Hematocrit 48.1 (A) 39.9 35.7   MCV 94.3 94.8 93.2   MCH 31.0 30.9 31.3   MCHC 32.8 32.6 33.6   RDW 13.9 14.1 14.3   Platelets 297 279 208   (A) Abnormal value            TSH    TSH 5/5/22 8/26/22   TSH 3.660 2.020           Lipid Panel    Lipid Panel 5/5/22 8/26/22   Total Cholesterol 263 (A) 190   Triglycerides 140 156 (A)   HDL Cholesterol 36 (A) 35 (A)   VLDL Cholesterol 26 28   LDL Cholesterol  201 (A) 127 (A)   LDL/HDL Ratio 5.53 3.54   (A) Abnormal value                 Data reviewed: " Cardiology studies        Results for orders placed during the hospital encounter of 08/25/22    Adult Transthoracic Echo Complete w/ Color, Spectral and Contrast if necessary per protocol    Interpretation Summary  · The aortic root measures 3.2 cm.  · Left ventricular ejection fraction appears to be 51 - 55%.  · The following left ventricular wall segments are hypokinetic: mid inferior, mid inferoseptal and basal inferior.  · Left ventricular diastolic dysfunction is noted.  · No evidence of significant valvular disease                   Assessment and Plan        Diagnoses and all orders for this visit:    1. Coronary artery disease involving native coronary artery of native heart without angina pectoris (Primary)  Assessment & Plan:  She is currently stable with no angina-like symptoms.  Continue aspirin, Brilinta, statins and beta-blocker.  We will enroll the patient in phase 2 cardiac rehabilitation    Orders:  -     Comprehensive Metabolic Panel; Future  -     Lipid Panel; Future    2. Mixed hyperlipidemia  Assessment & Plan:  Continue atorvastatin.  We will repeat lipid panel before next office visit.    Orders:  -     Comprehensive Metabolic Panel; Future  -     Lipid Panel; Future    3. Essential hypertension  Assessment & Plan:  Blood pressure on higher side in the office today.  Currently on lower dose of losartan due to hypotension later during hospital stay.  Will increase dose of losartan to 50 mg daily and continue Sabitha at the current dose.  Recommend to keep home blood pressure log as possible.    Orders:  -     losartan (COZAAR) 25 MG tablet; Take 2 tablets by mouth Daily.  Dispense: 90 tablet; Refill: 1    4. Ischemic cardiomyopathy  Assessment & Plan:  LV ejection fraction 50% with mild inferior hypokinesis per echocardiogram done during recent hospital stay.  Clinically, she is not volume overloaded.  Will increase dose of losartan 50 mg daily as mentioned above.  Continue bisoprolol  two-point milligrams daily.  She will need repeat echocardiogram after 3-month follow-up visit to reevaluate the LV systolic function.    Orders:  -     losartan (COZAAR) 25 MG tablet; Take 2 tablets by mouth Daily.  Dispense: 90 tablet; Refill: 1    5. History of ST elevation myocardial infarction (STEMI)  -     Ambulatory Referral to Cardiac Rehab            Follow Up     Return in about 3 months (around 12/12/2022) for Next scheduled follow up, with Ashley BLACK.    Patient was given instructions and counseling regarding her condition or for health maintenance advice. Please see specific information pulled into the AVS if appropriate.

## 2022-09-12 NOTE — ASSESSMENT & PLAN NOTE
LV ejection fraction 50% with mild inferior hypokinesis per echocardiogram done during recent hospital stay.  Clinically, she is not volume overloaded.  Will increase dose of losartan 50 mg daily as mentioned above.  Continue bisoprolol two-point milligrams daily.  She will need repeat echocardiogram after 3-month follow-up visit to reevaluate the LV systolic function.

## 2022-09-12 NOTE — ASSESSMENT & PLAN NOTE
She is currently stable with no angina-like symptoms.  Continue aspirin, Brilinta, statins and beta-blocker.  We will enroll the patient in phase 2 cardiac rehabilitation

## 2022-09-12 NOTE — ASSESSMENT & PLAN NOTE
Blood pressure on higher side in the office today.  Currently on lower dose of losartan due to hypotension later during hospital stay.  Will increase dose of losartan to 50 mg daily and continue Sabitha at the current dose.  Recommend to keep home blood pressure log as possible.

## 2022-09-18 DIAGNOSIS — J44.1 COPD WITH EXACERBATION: ICD-10-CM

## 2022-09-19 DIAGNOSIS — J44.1 COPD WITH EXACERBATION: ICD-10-CM

## 2022-09-20 ENCOUNTER — READMISSION MANAGEMENT (OUTPATIENT)
Dept: CALL CENTER | Facility: HOSPITAL | Age: 57
End: 2022-09-20

## 2022-09-20 NOTE — OUTREACH NOTE
AMI Week 4 Survey    Flowsheet Row Responses   [facility] patient discharged from? [facility]   Does the patient have one of the following disease processes/diagnoses(primary or secondary)? Acute MI (STEMI,NSTEMI)   Week 4 attempt successful? No          IHSAN CASAS - Registered Nurse

## 2022-10-16 DIAGNOSIS — E55.9 VITAMIN D DEFICIENCY: ICD-10-CM

## 2022-10-17 RX ORDER — ERGOCALCIFEROL 1.25 MG/1
CAPSULE ORAL
Qty: 13 CAPSULE | Refills: 1 | OUTPATIENT
Start: 2022-10-17

## 2022-10-19 DIAGNOSIS — J44.1 COPD WITH EXACERBATION: ICD-10-CM

## 2022-10-24 ENCOUNTER — TELEPHONE (OUTPATIENT)
Dept: CARDIOLOGY | Facility: CLINIC | Age: 57
End: 2022-10-24

## 2022-10-24 NOTE — TELEPHONE ENCOUNTER
Received VM regarding increased SOA- after C, patient was placed on Brilinta. Patient was told this could be a side effect and was instructed to call if it did not get any better.    SW patient and encouraged patient to take medication with caffiene, patient to call back by Wednesday if it has not helped.

## 2022-11-30 ENCOUNTER — OFFICE VISIT (OUTPATIENT)
Dept: FAMILY MEDICINE CLINIC | Facility: CLINIC | Age: 57
End: 2022-11-30

## 2022-11-30 VITALS
DIASTOLIC BLOOD PRESSURE: 98 MMHG | SYSTOLIC BLOOD PRESSURE: 134 MMHG | HEART RATE: 94 BPM | WEIGHT: 212.9 LBS | HEIGHT: 64 IN | BODY MASS INDEX: 36.35 KG/M2 | OXYGEN SATURATION: 96 %

## 2022-11-30 DIAGNOSIS — Z78.0 OSTEOPENIA AFTER MENOPAUSE: ICD-10-CM

## 2022-11-30 DIAGNOSIS — F32.0 CURRENT MILD EPISODE OF MAJOR DEPRESSIVE DISORDER WITHOUT PRIOR EPISODE: ICD-10-CM

## 2022-11-30 DIAGNOSIS — Z13.29 THYROID DISORDER SCREEN: ICD-10-CM

## 2022-11-30 DIAGNOSIS — F41.9 ANXIETY: ICD-10-CM

## 2022-11-30 DIAGNOSIS — I10 ESSENTIAL HYPERTENSION: Primary | ICD-10-CM

## 2022-11-30 DIAGNOSIS — K21.00 GASTROESOPHAGEAL REFLUX DISEASE WITH ESOPHAGITIS WITHOUT HEMORRHAGE: Chronic | ICD-10-CM

## 2022-11-30 DIAGNOSIS — F17.200 NICOTINE DEPENDENCE WITH CURRENT USE: ICD-10-CM

## 2022-11-30 DIAGNOSIS — Z79.899 MEDICATION MANAGEMENT: ICD-10-CM

## 2022-11-30 DIAGNOSIS — E78.49 OTHER HYPERLIPIDEMIA: ICD-10-CM

## 2022-11-30 DIAGNOSIS — R73.09 ELEVATED HEMOGLOBIN A1C: ICD-10-CM

## 2022-11-30 DIAGNOSIS — M85.80 OSTEOPENIA AFTER MENOPAUSE: ICD-10-CM

## 2022-11-30 DIAGNOSIS — J30.9 ALLERGIC RHINITIS, UNSPECIFIED SEASONALITY, UNSPECIFIED TRIGGER: ICD-10-CM

## 2022-11-30 DIAGNOSIS — J44.9 COPD ON LONG-TERM INHALED STEROID THERAPY: ICD-10-CM

## 2022-11-30 DIAGNOSIS — R06.02 SHORTNESS OF BREATH: ICD-10-CM

## 2022-11-30 DIAGNOSIS — E78.2 MIXED HYPERLIPIDEMIA: ICD-10-CM

## 2022-11-30 DIAGNOSIS — Z79.51 COPD ON LONG-TERM INHALED STEROID THERAPY: ICD-10-CM

## 2022-11-30 DIAGNOSIS — Z12.31 SCREENING MAMMOGRAM, ENCOUNTER FOR: ICD-10-CM

## 2022-11-30 DIAGNOSIS — Z12.11 COLON CANCER SCREENING: ICD-10-CM

## 2022-11-30 PROBLEM — J44.1 COPD WITH EXACERBATION: Status: RESOLVED | Noted: 2022-02-11 | Resolved: 2022-11-30

## 2022-11-30 PROCEDURE — 99214 OFFICE O/P EST MOD 30 MIN: CPT | Performed by: NURSE PRACTITIONER

## 2022-11-30 RX ORDER — SERTRALINE HYDROCHLORIDE 100 MG/1
150 TABLET, FILM COATED ORAL NIGHTLY
Qty: 45 TABLET | Refills: 1 | Status: SHIPPED | OUTPATIENT
Start: 2022-11-30 | End: 2023-02-08

## 2022-11-30 RX ORDER — PANTOPRAZOLE SODIUM 40 MG/1
40 TABLET, DELAYED RELEASE ORAL DAILY
Qty: 30 TABLET | Refills: 3 | Status: SHIPPED | OUTPATIENT
Start: 2022-11-30

## 2022-11-30 RX ORDER — LOSARTAN POTASSIUM 25 MG/1
50 TABLET ORAL
Qty: 90 TABLET | Refills: 1
Start: 2022-11-30 | End: 2022-12-12 | Stop reason: SDUPTHER

## 2022-11-30 RX ORDER — NICOTINE 21 MG/24HR
1 PATCH, TRANSDERMAL 24 HOURS TRANSDERMAL EVERY 24 HOURS
Qty: 28 EACH | Refills: 1 | Status: SHIPPED | OUTPATIENT
Start: 2022-11-30 | End: 2022-12-12 | Stop reason: SDUPTHER

## 2022-11-30 RX ORDER — LORATADINE 10 MG/1
10 TABLET ORAL DAILY
Qty: 90 TABLET | Refills: 1 | Status: SHIPPED | OUTPATIENT
Start: 2022-11-30

## 2022-11-30 NOTE — PROGRESS NOTES
Chief Complaint  Establish Care (RB patient establish care ), COPD, Shortness of Breath (Patient states her breathing has gotten worse since her heart attack 8/25/22.  Stents were placed ), and Nicotine Dependence (Patient would like nicotine patches to quit smoking )    SUBJECTIVE  Lindsey Mckinnon presents to [facility]     GERD-patient currently on Protonix daily.  She states she rarely has to supplement with any additional medication only if she eats something bad.    Chronic back pain (cervical and lumbar area)-patient is managed per [facility] pain management currently.    HTN-pt is s/p MI in 8/2022. She was started on bp medications at this time.   Patient states her blood pressure is running normal.  She does have a follow-up with cardiology in the next few weeks.    Hyperlipidemia-recently started on lipitor after MI in 8/22.  She states she is tolerating the medication well.  She does occasionally have some leg cramping.  She states she is taking the medication when she remembers during the day but not necessarily at bedtime.    Tobacco abuse-currently smoking 1/2 ppd.  Patient is interested in quitting.  She states she was on the patches when she was in the hospital after her heart attack and did well.  She is interested in starting those again.    CAD-pt is s/p stint placement in 8/22. She is on Brilinta since this occurred.    COPD-patient currently is not seeing pulmonology.  She does report that she is using her inhalers and she does not have to use her rescue inhaler at all.  She does report shortness of breath since having her heart attack.    History of Present Illness  Past Medical History:   Diagnosis Date   • Acute ST elevation myocardial infarction (STEMI) involving right coronary artery (HCC) 8/25/2022   • Alcoholism (HCC)    • Anemia    • Anxiety    • Arthritis    • Bipolar disorder (HCC)    • Breast lump    • Cervical spine pain    • Chronic allergic  rhinitis    • Chronic pain disorder    • COPD (chronic obstructive pulmonary disease) ([facility]) 04/15/2021   • Degenerative disc disease, cervical    • Depression    • Esophageal reflux    • Essential hypertension 04/15/2021   • Fibromyalgia    • Forgetfulness    • Gall stones    • GERD (gastroesophageal reflux disease)    • H/O emphysema    • Head injury    • Heart attack (Former[facility]) 2022   • Hemorrhoid    • Hernia cerebri (Former[facility])    • Hernia, hiatal    • Herniated disc, cervical    • Hyperlipidemia 04/15/2021   • Kidney stone    • Limb swelling    • Lumbar pain    • Migraine    • Mood disorder ([facility])    • Muscle cramps    • Nicotine dependence 04/15/2021   • Reflux esophagitis    • Shortness of breath    • Spinal stenosis    • STEMI (ST elevation myocardial infarction) (Formerly [facility]) 2022   • Tremor 04/15/2021      Family History   Problem Relation Age of Onset   • Depression Mother    • Bipolar disorder Mother    • Lung cancer Mother         MALIGNANT   • Diabetes Mother         UNSPECIFIED TYPE   • Cancer Mother         BLADDER   • Osteoporosis Mother    • Arthritis Mother    • Lung cancer Father         MALIGNANT   • Diabetes Father         UNSPECIFIED TYPE/ MELLITUS   • Heart attack Father    • Kidney cancer Father    • Cancer Father         BLADDER   • Arthritis Father    • Heart disease Father    • OCD Brother    • Depression Brother    • Diabetes Brother         UNSPECIFIED TYPE/MELLITUS   • Kidney cancer Brother    • Arthritis Brother    • Paranoid behavior Brother    • OCD Brother    • Arthritis Son       Past Surgical History:   Procedure Laterality Date   • CARDIAC CATHETERIZATION N/A 2022    Procedure: Left Heart Cath;  Surgeon: Adam Helms MD;  Location: [facility] INVASIVE LOCATION;  Service: Cardiovascular;  Laterality: N/A;   • CERVICAL EPIDURAL      STERIOD INJECTIONS    •  SECTION      X 2   •  SECTION  ,   • CHOLECYSTECTOMY     • ENDOSCOPY     • GALLBLADDER SURGERY    "  • HYSTERECTOMY     • HYSTERECTOMY  2005   • LUMBAR EPIDURAL INJECTION      STERIOD        Current Outpatient Medications:   •  aspirin 81 MG chewable tablet, Chew 1 tablet Daily., Disp: 99 tablet, Rfl: 99  •  atorvastatin (LIPITOR) 40 MG tablet, Take 1 tablet by mouth Every Night., Disp: 90 tablet, Rfl: 1  •  bisoprolol (ZEBeta) 5 MG tablet, Take 0.5 tablets by mouth Daily., Disp: 90 tablet, Rfl: 1  •  Fluticasone Furoate-Vilanterol (BREO ELLIPTA) 200-25 MCG/INH inhaler, INHALE 1 PUFF BY MOUTH DAILY, Disp: 60 each, Rfl: 1  •  loratadine (CLARITIN) 10 MG tablet, Take 1 tablet by mouth Daily., Disp: 90 tablet, Rfl: 1  •  losartan (COZAAR) 25 MG tablet, Take 2 tablets by mouth Daily., Disp: 90 tablet, Rfl: 1  •  metaxalone (SKELAXIN) 800 MG tablet, Take 800 mg by mouth 3 (Three) Times a Day As Needed., Disp: , Rfl:   •  oxyCODONE ER (oxyCONTIN) 15 MG tablet extended-release 12 hour, Take 15 mg by mouth Every 12 (Twelve) Hours., Disp: , Rfl:   •  pantoprazole (PROTONIX) 40 MG EC tablet, Take 1 tablet by mouth Daily., Disp: 30 tablet, Rfl: 3  •  sertraline (ZOLOFT) 100 MG tablet, Take 1.5 tablets by mouth Every Night for 60 days., Disp: 45 tablet, Rfl: 1  •  ticagrelor (BRILINTA) 90 MG tablet tablet, Take 1 tablet by mouth 2 (Two) Times a Day. Indications: Acute Coronary Syndrome, Disp: 180 tablet, Rfl: 3  •  tiotropium (Spiriva HandiHaler) 18 MCG per inhalation capsule, Place 1 capsule into inhaler and inhale Daily., Disp: 90 capsule, Rfl: 1  •  nicotine (Nicoderm CQ) 21 MG/24HR patch, Place 1 patch on the skin as directed by provider Daily., Disp: 28 each, Rfl: 1    OBJECTIVE  Vital Signs:   /98 (BP Location: Left arm)   Pulse 94   Ht 162.6 cm (64\")   Wt 96.6 kg (212 lb 14.4 oz)   SpO2 96%   BMI 36.54 kg/m²    Estimated body mass index is 36.54 kg/m² as calculated from the following:    Height as of this encounter: 162.6 cm (64\").    Weight as of this encounter: 96.6 kg (212 lb 14.4 oz).     Wt Readings " from Last 3 Encounters:   11/30/22 96.6 kg (212 lb 14.4 oz)   09/12/22 102 kg (224 lb)   08/30/22 104 kg (230 lb)     BP Readings from Last 3 Encounters:   11/30/22 134/98   09/12/22 148/82   08/30/22 (!) 89/62       Physical Exam  Vitals reviewed.   Constitutional:       Appearance: Normal appearance. She is well-developed.   HENT:      Head: Normocephalic and atraumatic.      Right Ear: External ear normal.      Left Ear: External ear normal.      Mouth/Throat:      Pharynx: No oropharyngeal exudate.   Eyes:      Conjunctiva/sclera: Conjunctivae normal.      Pupils: Pupils are equal, round, and reactive to light.   Cardiovascular:      Rate and Rhythm: Normal rate and regular rhythm.      Pulses: Normal pulses.      Heart sounds: Normal heart sounds. No murmur heard.    No friction rub. No gallop.   Pulmonary:      Effort: Pulmonary effort is normal.      Breath sounds: Normal breath sounds. No wheezing or rhonchi.   Skin:     General: Skin is warm and dry.   Neurological:      Mental Status: She is alert and oriented to person, place, and time.      Cranial Nerves: No cranial nerve deficit.   Psychiatric:         Mood and Affect: Mood and affect normal.         Behavior: Behavior normal.         Thought Content: Thought content normal.         Judgment: Judgment normal.          Result Review            Patient Care Team:  Mady Mcnally APRN as PCP - General (Family Medicine)  Arminda Artegaa APRN as Nurse Practitioner (Pain Medicine)  Adam Helms MD as Consulting Physician (Cardiology)    Class 2 Severe Obesity (BMI >=35 and <=39.9). Obesity-related health conditions include the following: hypertension, coronary heart disease, dyslipidemias, GERD and osteoarthritis. Obesity is improving with treatment. BMI is is above average; BMI management plan is completed. We discussed portion control and increasing exercise.       ASSESSMENT & PLAN    Diagnoses and all orders for this visit:    1. Essential  hypertension (Primary)  Comments:  BP well controlled, continue current medication  Orders:  -     losartan (COZAAR) 25 MG tablet; Take 2 tablets by mouth Daily.  Dispense: 90 tablet; Refill: 1    2. Gastroesophageal reflux disease with esophagitis without hemorrhage  Comments:  Stable on protonix 40mg daily  Orders:  -     pantoprazole (PROTONIX) 40 MG EC tablet; Take 1 tablet by mouth Daily.  Dispense: 30 tablet; Refill: 3    3. COPD on long-term inhaled steroid therapy (HCC)  Comments:  Continue inhalers, will check chest x-ray, encourage smoking cessation.  Orders:  -     tiotropium (Spiriva HandiHaler) 18 MCG per inhalation capsule; Place 1 capsule into inhaler and inhale Daily.  Dispense: 90 capsule; Refill: 1    4. Allergic rhinitis, unspecified seasonality, unspecified trigger  -     loratadine (CLARITIN) 10 MG tablet; Take 1 tablet by mouth Daily.  Dispense: 90 tablet; Refill: 1    5. Mixed hyperlipidemia    6. Anxiety  Comments:  Well-controlled with Zoloft, continue current medication  Orders:  -     sertraline (ZOLOFT) 100 MG tablet; Take 1.5 tablets by mouth Every Night for 60 days.  Dispense: 45 tablet; Refill: 1    7. Current mild episode of major depressive disorder without prior episode (HCC)  Comments:  Well-controlled with Zoloft, continue current medication  Orders:  -     sertraline (ZOLOFT) 100 MG tablet; Take 1.5 tablets by mouth Every Night for 60 days.  Dispense: 45 tablet; Refill: 1    8. Nicotine dependence with current use  Comments:  NicoDerm patches prescribed, side effect administration addressed.  Orders:  -      CT Chest Low Dose Cancer Screening WO; Future  -     nicotine (Nicoderm CQ) 21 MG/24HR patch; Place 1 patch on the skin as directed by provider Daily.  Dispense: 28 each; Refill: 1    9. Screening mammogram, encounter for  -     Mammo Screening Digital Tomosynthesis Bilateral With CAD; Future    10. Osteopenia after menopause  -     DEXA Bone Density Axial; Future    11.  Elevated hemoglobin A1c  -     Hemoglobin A1c; Future    12. Shortness of breath  -     XR Chest PA & Lateral; Future    13. Medication management  -     Comprehensive Metabolic Panel; Future  -     CBC & Differential; Future    14. Thyroid disorder screen  -     TSH; Future    15. Other hyperlipidemia  -     Lipid Panel; Future    16. Colon cancer screening  -     Ambulatory Referral For Screening Colonoscopy         Tobacco Use: High Risk   • Smoking Tobacco Use: Every Day   • Smokeless Tobacco Use: Never   • Passive Exposure: Not on file       Follow Up     Return in about 3 months (around 2/28/2023).        Patient was given instructions and counseling regarding her condition or for health maintenance advice. Please see specific information pulled into the AVS if appropriate.   I have reviewed information obtained and documented by others and I have confirmed the accuracy of this documented note.    Mady Mcnally, APRN

## 2022-12-07 ENCOUNTER — PATIENT ROUNDING (BHMG ONLY) (OUTPATIENT)
Dept: FAMILY MEDICINE CLINIC | Facility: CLINIC | Age: 57
End: 2022-12-07

## 2022-12-07 NOTE — PROGRESS NOTES
A [employer] message has been sent to the patient for PATIENT ROUNDING with [facility].

## 2022-12-12 ENCOUNTER — OFFICE VISIT (OUTPATIENT)
Dept: CARDIOLOGY | Facility: CLINIC | Age: 57
End: 2022-12-12

## 2022-12-12 VITALS
HEIGHT: 64 IN | SYSTOLIC BLOOD PRESSURE: 121 MMHG | WEIGHT: 212 LBS | HEART RATE: 89 BPM | BODY MASS INDEX: 36.19 KG/M2 | DIASTOLIC BLOOD PRESSURE: 86 MMHG

## 2022-12-12 DIAGNOSIS — F17.200 NICOTINE DEPENDENCE WITH CURRENT USE: ICD-10-CM

## 2022-12-12 DIAGNOSIS — I25.5 ISCHEMIC CARDIOMYOPATHY: ICD-10-CM

## 2022-12-12 DIAGNOSIS — I10 ESSENTIAL HYPERTENSION: ICD-10-CM

## 2022-12-12 DIAGNOSIS — I25.2 HISTORY OF ST ELEVATION MYOCARDIAL INFARCTION (STEMI): ICD-10-CM

## 2022-12-12 DIAGNOSIS — I25.10 CORONARY ARTERY DISEASE INVOLVING NATIVE CORONARY ARTERY OF NATIVE HEART WITHOUT ANGINA PECTORIS: Primary | ICD-10-CM

## 2022-12-12 DIAGNOSIS — E78.2 MIXED HYPERLIPIDEMIA: ICD-10-CM

## 2022-12-12 PROCEDURE — 99214 OFFICE O/P EST MOD 30 MIN: CPT | Performed by: FAMILY MEDICINE

## 2022-12-12 RX ORDER — ASPIRIN 81 MG/1
81 TABLET ORAL DAILY
Qty: 90 TABLET | Refills: 3 | Status: SHIPPED | OUTPATIENT
Start: 2022-12-12 | End: 2023-03-12

## 2022-12-12 RX ORDER — LOSARTAN POTASSIUM 25 MG/1
25 TABLET ORAL
Qty: 90 TABLET | Refills: 3
Start: 2022-12-12 | End: 2022-12-12 | Stop reason: SDUPTHER

## 2022-12-12 RX ORDER — BISOPROLOL FUMARATE 5 MG/1
5 TABLET, FILM COATED ORAL DAILY
Qty: 90 TABLET | Refills: 3 | Status: SHIPPED | OUTPATIENT
Start: 2022-12-12 | End: 2022-12-12 | Stop reason: SDUPTHER

## 2022-12-12 RX ORDER — BISOPROLOL FUMARATE 5 MG/1
5 TABLET, FILM COATED ORAL DAILY
Qty: 90 TABLET | Refills: 3 | Status: SHIPPED | OUTPATIENT
Start: 2022-12-12 | End: 2023-03-12

## 2022-12-12 RX ORDER — LOSARTAN POTASSIUM 25 MG/1
25 TABLET ORAL
Qty: 90 TABLET | Refills: 3
Start: 2022-12-12 | End: 2023-02-13

## 2022-12-12 RX ORDER — NICOTINE 21 MG/24HR
1 PATCH, TRANSDERMAL 24 HOURS TRANSDERMAL EVERY 24 HOURS
Qty: 30 PATCH | Refills: 0 | Status: SHIPPED | OUTPATIENT
Start: 2022-12-12 | End: 2023-01-11

## 2022-12-12 RX ORDER — LOSARTAN POTASSIUM 25 MG/1
50 TABLET ORAL
Qty: 180 TABLET | Refills: 3
Start: 2022-12-12 | End: 2022-12-12 | Stop reason: SDUPTHER

## 2022-12-12 RX ORDER — BISOPROLOL FUMARATE 5 MG/1
2.5 TABLET, FILM COATED ORAL
Qty: 90 TABLET | Refills: 1 | Status: SHIPPED | OUTPATIENT
Start: 2022-12-12 | End: 2022-12-12

## 2022-12-12 NOTE — PROGRESS NOTES
Chief Complaint  Coronary Artery Disease, Follow-up, Hyperlipidemia, and Hypertension    Subjective        History of Present Illness  Lindsey Mckinnon presents to [facility] CARDIOLOGY    Ms. Mckinnon is a 57-year-old female patient presenting today for routine follow-up.  She had STEMI in August 2022, with PCI to the mid RCA.  She has not experienced any further episodes of chest pain since the time of her MI.  She does report she has had an increase of shortness of breath since her MI.  She also has COPD, and uses chronic inhaler therapy to help manage the symptoms.  She previously contacted the office, and has been taking her Brilinta with caffeine, and stated this may have helped a small amount.   States the increase in shortness of breath is still tolerable, however it does make it more difficult for her to be very physically active.  States between shortness of breath and her chronic back pain she does not get very much physical activity.  She acknowledges that her smoking is a contributing factor, she recently had her PCP sent in a prescription for nicotine patches because those had helped her in the past.  However she was unable to fill them through her insurance, and she believes this may be related to the length of therapy for the prescription.  Cardiac wise, she reports she does not have any episodes of chest pains, palpitations, orthopnea, PND, or lower extremity swelling.  Blood pressure reading in the office is normal, at 121/86.    Past History:   1) Coronary artery disease : Index presentation acute inferior STEMI on 8/25/2022, status post primary angioplasty and stent placement to mid right coronary artery.  Procedure was complicated by a recurrent ventricular tachycardia, intubation. 2)Essential hypertension 3) Mixed hyperlipidemia 4) COPD  5)Chronic pain syndrome       PM  Past Medical History:   Diagnosis Date   • Acute ST elevation myocardial infarction (STEMI) involving  right coronary artery ([facility]) 2022   • Alcoholism ([facility])    • Anemia    • Anxiety    • Arthritis    • Bipolar disorder ([facility])    • Breast lump    • Cervical spine pain    • Chronic allergic rhinitis    • Chronic pain disorder    • COPD (chronic obstructive pulmonary disease) ([facility]) 04/15/2021   • Degenerative disc disease, cervical    • Depression    • Esophageal reflux    • Essential hypertension 04/15/2021   • Fibromyalgia    • Forgetfulness    • Gall stones    • GERD (gastroesophageal reflux disease)    • H/O emphysema    • Head injury    • Heart attack ([facility]) 2022   • Hemorrhoid    • Hernia cerebri ([facility])    • Hernia, hiatal    • Herniated disc, cervical    • Hyperlipidemia 04/15/2021   • Kidney stone    • Limb swelling    • Lumbar pain    • Migraine    • Mood disorder ([facility])    • Muscle cramps    • Nicotine dependence 04/15/2021   • Reflux esophagitis    • Shortness of breath    • Spinal stenosis    • STEMI (ST elevation myocardial infarction) ([facility]) 2022   • Tremor 04/15/2021         ALLERGY  Allergies   Allergen Reactions   • Morphine Itching   • Sulfa Antibiotics Nausea And Vomiting          SURGICALHX  Past Surgical History:   Procedure Laterality Date   • CARDIAC CATHETERIZATION N/A 2022    Procedure: Left Heart Cath;  Surgeon: Adam Helms MD;  Location: [facility] INVASIVE LOCATION;  Service: Cardiovascular;  Laterality: N/A;   • CERVICAL EPIDURAL      STERIOD INJECTIONS    •  SECTION      X 2   •  SECTION     • CHOLECYSTECTOMY     • ENDOSCOPY     • GALLBLADDER SURGERY     • HYSTERECTOMY     • HYSTERECTOMY     • LUMBAR EPIDURAL INJECTION      STERIOD          SOC  Social History     Socioeconomic History   • Marital status:    Tobacco Use   • Smoking status: Every Day     Packs/day: 0.25     Years: 40.00     Pack years: 10.00     Types: Cigarettes   • Smokeless tobacco: Never   • Tobacco comments:     STARTED AT AGE 15   Vaping Use   • Vaping Use:  Never used   Substance and Sexual Activity   • Alcohol use: Not Currently   • Drug use: Not Currently     Types: Marijuana   • Sexual activity: Defer         FAMHX  Family History   Problem Relation Age of Onset   • Depression Mother    • Bipolar disorder Mother    • Lung cancer Mother         MALIGNANT   • Diabetes Mother         UNSPECIFIED TYPE   • Cancer Mother         BLADDER   • Osteoporosis Mother    • Arthritis Mother    • Lung cancer Father         MALIGNANT   • Diabetes Father         UNSPECIFIED TYPE/ MELLITUS   • Heart attack Father    • Kidney cancer Father    • Cancer Father         BLADDER   • Arthritis Father    • Heart disease Father    • OCD Brother    • Depression Brother    • Diabetes Brother         UNSPECIFIED TYPE/MELLITUS   • Kidney cancer Brother    • Arthritis Brother    • Paranoid behavior Brother    • OCD Brother    • Arthritis Son           MEDLINCOLNGOHONG  Current Outpatient Medications on File Prior to Visit   Medication Sig   • Fluticasone Furoate-Vilanterol (BREO ELLIPTA) 200-25 MCG/INH inhaler INHALE 1 PUFF BY MOUTH DAILY   • loratadine (CLARITIN) 10 MG tablet Take 1 tablet by mouth Daily.   • metaxalone (SKELAXIN) 800 MG tablet Take 800 mg by mouth 3 (Three) Times a Day As Needed.   • oxyCODONE ER (oxyCONTIN) 15 MG tablet extended-release 12 hour Take 15 mg by mouth Every 12 (Twelve) Hours.   • pantoprazole (PROTONIX) 40 MG EC tablet Take 1 tablet by mouth Daily.   • sertraline (ZOLOFT) 100 MG tablet Take 1.5 tablets by mouth Every Night for 60 days.   • tiotropium (Spiriva HandiHaler) 18 MCG per inhalation capsule Place 1 capsule into inhaler and inhale Daily.   • bisoprolol (ZEBeta) 5 MG tablet Take 0.5 tablets by mouth Daily.   • losartan (COZAAR) 25 MG tablet Take 2 tablets by mouth Daily.   •  ticagrelor (BRILINTA) 90 MG tablet tablet Take 1 tablet by mouth 2 (Two) Times a Day. Indications: Acute Coronary Syndrome   • aspirin 81 MG chewable tablet Chew 1 tablet Daily.   •  "atorvastatin (LIPITOR) 40 MG tablet Take 1 tablet by mouth Every Night.   • [DISCONTINUED] nicotine (Nicoderm CQ) 21 MG/24HR patch Place 1 patch on the skin as directed by provider Daily.     No current facility-administered medications on file prior to visit.       REVIEW OF SYSTEMS  Review of Systems   Constitutional: Negative for activity change, chills and fatigue.   Respiratory: Positive for shortness of breath. Negative for cough and chest tightness.    Cardiovascular: Negative for chest pain, palpitations and leg swelling.   Gastrointestinal: Negative for nausea and vomiting.   Musculoskeletal: Positive for back pain.   Skin: Negative for rash.   Neurological: Negative for dizziness, syncope and light-headedness.   Hematological: Does not bruise/bleed easily.        Objective   Vitals:    12/12/22 1317   BP: 121/86   Pulse: 89   Weight: 96.2 kg (212 lb)   Height: 162.6 cm (64\")         Physical Exam  Constitutional:       General: She is awake. She is not in acute distress.     Appearance: Normal appearance.   Eyes:      General: No scleral icterus.     Conjunctiva/sclera: Conjunctivae normal.   Neck:      Vascular: No JVD.   Cardiovascular:      Rate and Rhythm: Normal rate and regular rhythm.  No extrasystoles are present.     Heart sounds: Normal heart sounds, S1 normal and S2 normal. No murmur heard.    No friction rub. No gallop.   Pulmonary:      Effort: Pulmonary effort is normal. Prolonged expiration present.      Breath sounds: Normal breath sounds. No wheezing, rhonchi or rales.   Abdominal:      General: Bowel sounds are normal. There is no distension.      Palpations: Abdomen is soft.   Musculoskeletal:         General: Normal range of motion.      Right lower leg: No edema.      Left lower leg: No edema.   Skin:     General: Skin is warm and dry.   Neurological:      Mental Status: She is alert and oriented to person, place, and time.         Result Review     The following data was reviewed by " Ashley King Gail, APRN on 12/12/22.    proBNP   Date Value Ref Range Status   08/25/2022 45.6 0.0 - 900.0 pg/mL Final     CMP    CMP 8/25/22 8/25/22 8/25/22 8/26/22 8/27/22 1824 1958 2129     Glucose 177 (A) 176 (A) 188 (A) 143 (A) 104 (A)   BUN 14 12 11 13 9   Creatinine 0.70 0.57 0.61 0.85 0.60   Sodium 140 141 139 137 141   Potassium 2.9 (A) 3.1 (A) 4.4 4.6 3.4 (A)   Chloride 110 (A) 110 (A) 110 (A) 108 (A) 109 (A)   Calcium 7.3 (A) 7.4 (A) 7.6 (A) 7.7 (A) 8.6   Albumin 2.80 (A) 3.70 3.60 3.60 3.50   Total Bilirubin <0.2 0.3 0.2 0.2 0.4   Alkaline Phosphatase 62 81 74 67 63   AST (SGOT) 24 37 (A) 50 (A) 89 (A) 119 (A)   ALT (SGPT) 21 28 29 35 (A) 42 (A)   (A) Abnormal value       Comments are available for some flowsheets but are not being displayed.           CBC w/diff    CBC w/Diff 8/25/22 8/26/22 8/27/22   WBC 18.09 (A) 18.55 (A) 12.10 (A)   RBC 5.10 4.21 3.83   Hemoglobin 15.8 13.0 12.0   Hematocrit 48.1 (A) 39.9 35.7   MCV 94.3 94.8 93.2   MCH 31.0 30.9 31.3   MCHC 32.8 32.6 33.6   RDW 13.9 14.1 14.3   Platelets 297 279 208   Neutrophil Rel % 65.4 81.6 (A)    Immature Granulocyte Rel % 1.0 (A) 0.7 (A)    Lymphocyte Rel % 25.5 11.5 (A)    Monocyte Rel % 5.6 5.9    Eosinophil Rel % 1.8 0.1 (A)    Basophil Rel % 0.7 0.2    (A) Abnormal value             Lab Results   Component Value Date    TSH 2.020 08/26/2022      Lab Results   Component Value Date    FREET4 1.04 05/05/2022      No results found for: DDIMERQUANT  Magnesium   Date Value Ref Range Status   08/27/2022 2.0 1.6 - 2.6 mg/dL Final      No results found for: DIGOXIN   Lab Results   Component Value Date    TROPONINT 2.050 (C) 08/27/2022           Lipid Panel    Lipid Panel 5/5/22 8/26/22   Total Cholesterol 263 (A) 190   Triglycerides 140 156 (A)   HDL Cholesterol 36 (A) 35 (A)   VLDL Cholesterol 26 28   LDL Cholesterol  201 (A) 127 (A)   LDL/HDL Ratio 5.53 3.54   (A) Abnormal value              Results for orders placed during the hospital  encounter of 08/25/22    Adult Transthoracic Echo Complete w/ Color, Spectral and Contrast if necessary per protocol    Interpretation Summary  · The aortic root measures 3.2 cm.  · Left ventricular ejection fraction appears to be 51 - 55%.  · The following left ventricular wall segments are hypokinetic: mid inferior, mid inferoseptal and basal inferior.  · Left ventricular diastolic dysfunction is noted.  · No evidence of significant valvular disease         Assessment and Plan   Diagnoses and all orders for this visit:    1. Coronary artery disease involving native coronary artery of native heart without angina pectoris (Primary)  Assessment & Plan:  Stable, without complaints of chest pain.   Shortness of breath is likely a combination of underlying COPD and side effect of Brilinta.  We discussed at length option to transition to Plavix, she would like to work on smoking cessation first.   Continue DAPT with Brilinta and aspirin, beta-blocker and statin therapy.       Orders:   -     aspirin 81 MG EC tablet; Take 1 tablet by mouth Daily for 90 days.  Dispense: 90 tablet; Refill: 3  -     ticagrelor (BRILINTA) 90 MG tablet tablet; Take 1 tablet by mouth 2 (Two) Times a Day.  Dispense: 180 tablet; Refill: 3  - bisoprolol (ZEBeta) 5 MG tablet; Take 1 tablet by mouth Daily for 90 days.  Dispense: 90 tablet; Refill: 3    2. Mixed hyperlipidemia  Assessment & Plan:   Most recent , not at goal.  However this was at the time of her initiation to statin therapy.  Continue atorvastatin, and instructed patient there are already orders placed to recheck her lipid profile.  She is agreeable to have those labs drawn this week.  Can make adjustment to statins as needed once lipid profile is resulted.   No current complaints of myalgias, she did have elevation of her AST on lab work in August.  We will reevaluate those with previously ordered chemistry panel.      3. Essential hypertension  Assessment & Plan:  Blood  pressure is well controlled.  Upon reviewing medication management with her, she explained that she never picked up prescriptions with increased dose of losartan,(was increased to 50 mg every visit) and had been taking the bisoprolol 5 mg as a whole tablet instead of cutting in half.   Since her blood pressure has been well controlled without regiment, I will change her prescriptions to match this.  Advised her to monitor her blood pressure at home, and to contact us if she has abnormal readings.    Orders:  - losartan (COZAAR) 25 MG tablet; Take 1 tablet by mouth Daily.  Dispense: 90 tablet; Refill: 3    4. Ischemic cardiomyopathy  Echocardiogram at time of her MI showed LV with an EF of  50%, with mild inferior hypokinesis.  No volume overload noted on physical exam.   We will schedule her to really evaluate her LV function with repeat echocardiogram.       5. History of ST elevation myocardial infarction (STEMI)  Assessment & Plan:  Counseled on the importance of DAPT therapy.  We did discuss the option to change Brilinta to Plavix due to her shortness of breath.   She is willing to continue the Brilinta and see if smoking cessation helps her.  She will have follow-up in 9 months, which would put her at the 1 year jose from her PCI.  If she is continuing on Brilinta at that time we can decrease to 60 mg twice daily.        6.  Nicotine dependence with current use  Assessment & Plan:  She voiced understanding of the risk associated with smoking to include worsening of her respiratory status, malignancies, and risk for restenosis of coronary stent.  She wishes to pursue smoking cessation, and request that I resend prescription for nicotine patches to see if her insurance will cover a 30-day supply.  We also discussed overall cost of cash price of nicotine versus cigarettes.  If this is still not an option for her and the NicoDerm is not feel, I would encourage her to consider other pharmaceutical management to  assist her in smoking cessation.     Orders:  -     nicotine (Nicoderm CQ) 21 MG/24HR patch; Place 1 patch on the skin as directed by provider Daily for 30 days.  Dispense: 30 patch; Refill: 0            Follow Up   Return in about 9 months (around 9/12/2023) for with Dr. Helms.    Patient was given instructions and counseling regarding her condition or for health maintenance advice. Please see specific information pulled into the AVS if appropriate.     Ashley Reynolds, SOFIA  12/12/22  13:01 EST    Dictated Utilizing Dragon Dictation

## 2022-12-13 NOTE — ASSESSMENT & PLAN NOTE
She voiced understanding of the risk associated with smoking to include worsening of her respiratory status, malignancies, and risk for restenosis of coronary stent.  She wishes to pursue smoking cessation, and request that I resend prescription for nicotine patches to see if her insurance will cover a 30-day supply.  We also discussed overall cost of cash price of nicotine versus cigarettes.  If this is still not an option for her and the NicoDerm is not feel, I would encourage her to consider other pharmaceutical management to assist her in smoking cessation.

## 2022-12-13 NOTE — ASSESSMENT & PLAN NOTE
Echocardiogram at time of her MI showed LV with an EF of  50%, with mild inferior hypokinesis.  No volume overload noted on physical exam.   We will schedule her to really evaluate her LV function with repeat echocardiogram.

## 2022-12-13 NOTE — ASSESSMENT & PLAN NOTE
Counseled on the importance of DAPT therapy.  We did discuss the option to change Brilinta to Plavix due to her shortness of breath.   She is willing to continue the Brilinta and see if smoking cessation helps her.  She will have follow-up in 9 months, which would put her at the 1 year jose from her PCI.  If she is continuing on Brilinta at that time we can decrease to 60 mg twice daily.

## 2022-12-13 NOTE — ASSESSMENT & PLAN NOTE
Stable, without complaints of chest pain.   Shortness of breath is likely a combination of underlying COPD and side effect of Brilinta.  We discussed at length option to transition to Plavix, she would like to work on smoking cessation first.   Continue DAPT with Brilinta and aspirin, beta-blocker and statin therapy.

## 2022-12-13 NOTE — ASSESSMENT & PLAN NOTE
Most recent , not at goal.  However this was at the time of her initiation to statin therapy.  Continue atorvastatin, and instructed patient there are already orders placed to recheck her lipid profile.  She is agreeable to have those labs drawn this week.  Can make adjustment to statins as needed once lipid profile is resulted.  No current complaints of myalgias, she did have elevation of her AST on lab work in August.  We will reevaluate those with previously ordered chemistry panel.

## 2022-12-13 NOTE — ASSESSMENT & PLAN NOTE
Blood pressure is well controlled.  Upon reviewing medication management with her, she explained that she never picked up prescriptions with increased dose of losartan,(was increased to 50 mg every visit) and had been taking the bisoprolol 5 mg as a whole tablet instead of cutting in half.   Since her blood pressure has been well controlled without regiment, I will change her prescriptions to match this.  Advised her to monitor her blood pressure at home, and to contact us if she has abnormal readings.

## 2023-01-29 DIAGNOSIS — J44.1 COPD WITH EXACERBATION: ICD-10-CM

## 2023-01-30 RX ORDER — FLUTICASONE FUROATE AND VILANTEROL 200; 25 UG/1; UG/1
POWDER RESPIRATORY (INHALATION)
Qty: 60 EACH | Refills: 1 | Status: SHIPPED | OUTPATIENT
Start: 2023-01-30 | End: 2023-04-03

## 2023-02-01 ENCOUNTER — TRANSCRIBE ORDERS (OUTPATIENT)
Dept: ADMINISTRATIVE | Facility: HOSPITAL | Age: 58
End: 2023-02-01
Payer: COMMERCIAL

## 2023-02-01 ENCOUNTER — HOSPITAL ENCOUNTER (OUTPATIENT)
Dept: GENERAL RADIOLOGY | Facility: HOSPITAL | Age: 58
Discharge: HOME | End: 2023-02-01
Payer: COMMERCIAL

## 2023-02-01 DIAGNOSIS — R06.02 SHORTNESS OF BREATH: ICD-10-CM

## 2023-02-01 DIAGNOSIS — M54.6 PAIN IN THORACIC SPINE: Primary | ICD-10-CM

## 2023-02-01 DIAGNOSIS — M54.6 PAIN IN THORACIC SPINE: ICD-10-CM

## 2023-02-01 PROCEDURE — 72072 X-RAY EXAM THORAC SPINE 3VWS: CPT

## 2023-02-01 PROCEDURE — 71046 X-RAY EXAM CHEST 2 VIEWS: CPT

## 2023-02-07 DIAGNOSIS — F32.0 CURRENT MILD EPISODE OF MAJOR DEPRESSIVE DISORDER WITHOUT PRIOR EPISODE: ICD-10-CM

## 2023-02-07 DIAGNOSIS — F41.9 ANXIETY: ICD-10-CM

## 2023-02-08 RX ORDER — SERTRALINE HYDROCHLORIDE 100 MG/1
TABLET, FILM COATED ORAL
Qty: 45 TABLET | Refills: 0 | Status: SHIPPED | OUTPATIENT
Start: 2023-02-08 | End: 2023-03-16

## 2023-02-11 DIAGNOSIS — I10 ESSENTIAL HYPERTENSION: ICD-10-CM

## 2023-02-13 RX ORDER — LOSARTAN POTASSIUM 25 MG/1
25 TABLET ORAL
Qty: 90 TABLET | Refills: 3 | Status: SHIPPED | OUTPATIENT
Start: 2023-02-13

## 2023-03-14 DIAGNOSIS — F32.0 CURRENT MILD EPISODE OF MAJOR DEPRESSIVE DISORDER WITHOUT PRIOR EPISODE: ICD-10-CM

## 2023-03-14 DIAGNOSIS — F41.9 ANXIETY: ICD-10-CM

## 2023-03-15 ENCOUNTER — TRANSCRIBE ORDERS (OUTPATIENT)
Dept: ADMINISTRATIVE | Facility: HOSPITAL | Age: 58
End: 2023-03-15
Payer: COMMERCIAL

## 2023-03-15 ENCOUNTER — HOSPITAL ENCOUNTER (OUTPATIENT)
Dept: GENERAL RADIOLOGY | Facility: HOSPITAL | Age: 58
Discharge: HOME | End: 2023-03-15
Payer: COMMERCIAL

## 2023-03-15 DIAGNOSIS — M51.36 OTHER INTERVERTEBRAL DISC DEGENERATION, LUMBAR REGION: Primary | ICD-10-CM

## 2023-03-15 DIAGNOSIS — M51.36 OTHER INTERVERTEBRAL DISC DEGENERATION, LUMBAR REGION: ICD-10-CM

## 2023-03-15 PROCEDURE — 72100 X-RAY EXAM L-S SPINE 2/3 VWS: CPT

## 2023-03-16 RX ORDER — SERTRALINE HYDROCHLORIDE 100 MG/1
TABLET, FILM COATED ORAL
Qty: 45 TABLET | Refills: 0 | Status: SHIPPED | OUTPATIENT
Start: 2023-03-16

## 2023-03-29 ENCOUNTER — OFFICE VISIT (OUTPATIENT)
Dept: NEUROSURGERY | Facility: CLINIC | Age: 58
End: 2023-03-29
Payer: COMMERCIAL

## 2023-03-29 VITALS
HEART RATE: 89 BPM | HEIGHT: 65 IN | DIASTOLIC BLOOD PRESSURE: 80 MMHG | WEIGHT: 198 LBS | BODY MASS INDEX: 32.99 KG/M2 | SYSTOLIC BLOOD PRESSURE: 140 MMHG

## 2023-03-29 DIAGNOSIS — M51.36 DDD (DEGENERATIVE DISC DISEASE), LUMBAR: Primary | ICD-10-CM

## 2023-03-29 DIAGNOSIS — M54.42 CHRONIC MIDLINE LOW BACK PAIN WITH BILATERAL SCIATICA: ICD-10-CM

## 2023-03-29 DIAGNOSIS — G89.29 CHRONIC MIDLINE LOW BACK PAIN WITH BILATERAL SCIATICA: ICD-10-CM

## 2023-03-29 DIAGNOSIS — M54.41 CHRONIC MIDLINE LOW BACK PAIN WITH BILATERAL SCIATICA: ICD-10-CM

## 2023-03-29 PROCEDURE — 3079F DIAST BP 80-89 MM HG: CPT | Performed by: PHYSICIAN ASSISTANT

## 2023-03-29 PROCEDURE — 99214 OFFICE O/P EST MOD 30 MIN: CPT | Performed by: PHYSICIAN ASSISTANT

## 2023-03-29 PROCEDURE — 1160F RVW MEDS BY RX/DR IN RCRD: CPT | Performed by: PHYSICIAN ASSISTANT

## 2023-03-29 PROCEDURE — 3077F SYST BP >= 140 MM HG: CPT | Performed by: PHYSICIAN ASSISTANT

## 2023-03-29 PROCEDURE — 1159F MED LIST DOCD IN RCRD: CPT | Performed by: PHYSICIAN ASSISTANT

## 2023-03-29 NOTE — PROGRESS NOTES
"Chief Complaint  Back Pain, Hip Pain (Bilateral  (right)), and Numbness (Bilateral (right))    Subjective          Lindsey Mckinnon who is a 57 y.o. year old female who presents to [facility] for Evaluation of the Spine.     The patient complains of pain located in the Lumbar Spine.  Patients states the pain has been present for \"several years\"  The pain came on gradually.  The pain scaled level is 5.  The pain does radiate. Dermatomes are located bilaterally Lumbar at: to the calves, right greater than left.  The pain is Intermittent and described as burning.  The pain is worse at no particular time of day. Patient states nothing improves the pain.  Patient states Prolonged Standing, Prolonged Walking and Bending makes the pain worse.    Associated Symptoms Include: Numbness and Tingling, she report subjective weakness in both legs.  Conservative Interventions Include: Physical Therapy that was ineffective., Epidural Steroids that were ineffective., Pain Medications that were somewhat effective. and Muscle Relaxants that were somewhat effective.    Was this the result of an injury or accident?: No    History of Previous Spinal Surgery?: No     reports that she has been smoking cigarettes. She has a 7.50 pack-year smoking history. She has never used smokeless tobacco.    Review of Systems   Musculoskeletal: Positive for back pain.        Objective   Vital Signs:   /80   Pulse 89   Ht 165.1 cm (65\")   Wt 89.8 kg (198 lb)   BMI 32.95 kg/m²       Physical Exam  Constitutional:       Appearance: Normal appearance. She is obese.   Pulmonary:      Effort: Pulmonary effort is normal.   Musculoskeletal:         General: Tenderness (midline lumbar spine and bilateral lumbar paraspinals) present.      Comments: SLR on the right causes pain in the lower back   Neurological:      General: No focal deficit present.      Mental Status: She is alert and oriented to person, " place, and time.      Sensory: No sensory deficit.      Motor: No weakness.      Deep Tendon Reflexes: Reflexes normal.   Psychiatric:         Mood and Affect: Mood normal.         Behavior: Behavior normal.        Neurologic Exam     Mental Status   Oriented to person, place, and time.        Result Review     I have personally reviewed the x-ray of lumbar spine from 3/15/2023 which shows mild to moderate degenerative changes of the lumbar spine.    I personally reviewed the radiology report for MRI of the lumbar spine without contrast from 11/9/2021 which shows multilevel degenerative disc disease without high-grade central canal or foraminal stenosis.  The worst finding was moderate right foraminal narrowing at L5-S1 and moderate left foraminal narrowing at L4-L5.  There is also moderate bilateral foraminal stenosis at L2-L3.     Assessment and Plan    Diagnoses and all orders for this visit:    1. DDD (degenerative disc disease), lumbar (Primary)  -     MRI Lumbar Spine With & Without Contrast; Future    2. Chronic midline low back pain with bilateral sciatica  -     MRI Lumbar Spine With & Without Contrast; Future    Her pain is in the lower back and down both legs to the calves.    She has degenerative changes on the x-ray of the lumbar spine from 3/15/23.    She had previous MRI on 2021 of the lumbar spine showing stenosis at L4-L5 and L5-S1.    I am going to order a new MRI of the lumbar spine without contrast to evaluate for worsening lumbar pathology.    The patient was counseled on basic recommendations for the reduction and prevention of back, neck, or spine pain in association with spinal disorders, including: cessation/avoidance of nicotine use, maintenance of a healthy BMI and weight, focusing on building/maintaining core strength through core exercise, and avoidance of activities which worsen the pain. The patient will monitor for changes in symptoms and notify our clinic of these changes as  needed.    She will follow-up here in 4 weeks to reassess and review the MRI, sooner if needed.    Follow Up   Return in about 4 weeks (around 4/26/2023).  Patient was given instructions and counseling regarding her condition or for health maintenance advice. Please see specific information pulled into the AVS if appropriate.

## 2023-03-30 ENCOUNTER — PATIENT ROUNDING (BHMG ONLY) (OUTPATIENT)
Dept: NEUROSURGERY | Facility: CLINIC | Age: 58
End: 2023-03-30
Payer: COMMERCIAL

## 2023-03-30 DIAGNOSIS — J44.1 COPD WITH EXACERBATION: ICD-10-CM

## 2023-04-03 RX ORDER — FLUTICASONE FUROATE AND VILANTEROL 200; 25 UG/1; UG/1
POWDER RESPIRATORY (INHALATION)
Qty: 60 EACH | Refills: 1 | Status: SHIPPED | OUTPATIENT
Start: 2023-04-03

## 2023-04-26 ENCOUNTER — OFFICE VISIT (OUTPATIENT)
Dept: FAMILY MEDICINE CLINIC | Facility: CLINIC | Age: 58
End: 2023-04-26
Payer: COMMERCIAL

## 2023-04-26 ENCOUNTER — LAB (OUTPATIENT)
Dept: LAB | Facility: HOSPITAL | Age: 58
End: 2023-04-26
Payer: COMMERCIAL

## 2023-04-26 VITALS
HEIGHT: 65 IN | HEART RATE: 96 BPM | SYSTOLIC BLOOD PRESSURE: 124 MMHG | DIASTOLIC BLOOD PRESSURE: 87 MMHG | OXYGEN SATURATION: 96 % | WEIGHT: 194 LBS | BODY MASS INDEX: 32.32 KG/M2

## 2023-04-26 DIAGNOSIS — J44.9 COPD ON LONG-TERM INHALED STEROID THERAPY: Primary | ICD-10-CM

## 2023-04-26 DIAGNOSIS — E78.2 MIXED HYPERLIPIDEMIA: ICD-10-CM

## 2023-04-26 DIAGNOSIS — E78.49 OTHER HYPERLIPIDEMIA: ICD-10-CM

## 2023-04-26 DIAGNOSIS — E55.9 VITAMIN D DEFICIENCY: ICD-10-CM

## 2023-04-26 DIAGNOSIS — Z13.29 THYROID DISORDER SCREEN: ICD-10-CM

## 2023-04-26 DIAGNOSIS — K21.00 GASTROESOPHAGEAL REFLUX DISEASE WITH ESOPHAGITIS WITHOUT HEMORRHAGE: Chronic | ICD-10-CM

## 2023-04-26 DIAGNOSIS — F32.0 CURRENT MILD EPISODE OF MAJOR DEPRESSIVE DISORDER WITHOUT PRIOR EPISODE: ICD-10-CM

## 2023-04-26 DIAGNOSIS — I25.10 CORONARY ARTERY DISEASE INVOLVING NATIVE CORONARY ARTERY OF NATIVE HEART WITHOUT ANGINA PECTORIS: ICD-10-CM

## 2023-04-26 DIAGNOSIS — Z13.29 SCREENING FOR THYROID DISORDER: ICD-10-CM

## 2023-04-26 DIAGNOSIS — Z79.51 COPD ON LONG-TERM INHALED STEROID THERAPY: Primary | ICD-10-CM

## 2023-04-26 DIAGNOSIS — Z79.899 MEDICATION MANAGEMENT: ICD-10-CM

## 2023-04-26 DIAGNOSIS — Z72.0 TOBACCO ABUSE: ICD-10-CM

## 2023-04-26 DIAGNOSIS — F41.9 ANXIETY: ICD-10-CM

## 2023-04-26 DIAGNOSIS — R73.09 ELEVATED HEMOGLOBIN A1C: ICD-10-CM

## 2023-04-26 DIAGNOSIS — J30.9 ALLERGIC RHINITIS, UNSPECIFIED SEASONALITY, UNSPECIFIED TRIGGER: ICD-10-CM

## 2023-04-26 PROBLEM — M54.6 THORACIC BACK PAIN: Status: ACTIVE | Noted: 2023-01-20

## 2023-04-26 PROBLEM — G89.29 CHRONIC PAIN: Status: ACTIVE | Noted: 2023-04-26

## 2023-04-26 LAB
25(OH)D3 SERPL-MCNC: 37.9 NG/ML (ref 30–100)
ALBUMIN SERPL-MCNC: 4.3 G/DL (ref 3.5–5.2)
ALBUMIN/GLOB SERPL: 1.4 G/DL
ALP SERPL-CCNC: 93 U/L (ref 39–117)
ALT SERPL W P-5'-P-CCNC: 13 U/L (ref 1–33)
ANION GAP SERPL CALCULATED.3IONS-SCNC: 11 MMOL/L (ref 5–15)
AST SERPL-CCNC: 15 U/L (ref 1–32)
BASOPHILS # BLD AUTO: 0.09 10*3/MM3 (ref 0–0.2)
BASOPHILS NFR BLD AUTO: 0.8 % (ref 0–1.5)
BILIRUB SERPL-MCNC: 0.2 MG/DL (ref 0–1.2)
BUN SERPL-MCNC: 9 MG/DL (ref 6–20)
BUN/CREAT SERPL: 11.8 (ref 7–25)
CALCIUM SPEC-SCNC: 9.8 MG/DL (ref 8.6–10.5)
CHLORIDE SERPL-SCNC: 105 MMOL/L (ref 98–107)
CHOLEST SERPL-MCNC: 251 MG/DL (ref 0–200)
CO2 SERPL-SCNC: 24 MMOL/L (ref 22–29)
CREAT SERPL-MCNC: 0.76 MG/DL (ref 0.57–1)
DEPRECATED RDW RBC AUTO: 43.7 FL (ref 37–54)
EGFRCR SERPLBLD CKD-EPI 2021: 91.5 ML/MIN/1.73
EOSINOPHIL # BLD AUTO: 0.23 10*3/MM3 (ref 0–0.4)
EOSINOPHIL NFR BLD AUTO: 1.9 % (ref 0.3–6.2)
ERYTHROCYTE [DISTWIDTH] IN BLOOD BY AUTOMATED COUNT: 13.1 % (ref 12.3–15.4)
GLOBULIN UR ELPH-MCNC: 3.1 GM/DL
GLUCOSE SERPL-MCNC: 72 MG/DL (ref 65–99)
HBA1C MFR BLD: 6 % (ref 4.8–5.6)
HCT VFR BLD AUTO: 46.6 % (ref 34–46.6)
HDLC SERPL-MCNC: 37 MG/DL (ref 40–60)
HGB BLD-MCNC: 15.4 G/DL (ref 12–15.9)
IMM GRANULOCYTES # BLD AUTO: 0.06 10*3/MM3 (ref 0–0.05)
IMM GRANULOCYTES NFR BLD AUTO: 0.5 % (ref 0–0.5)
LDLC SERPL CALC-MCNC: 193 MG/DL (ref 0–100)
LDLC/HDLC SERPL: 5.15 {RATIO}
LYMPHOCYTES # BLD AUTO: 3.17 10*3/MM3 (ref 0.7–3.1)
LYMPHOCYTES NFR BLD AUTO: 26.8 % (ref 19.6–45.3)
MCH RBC QN AUTO: 30.4 PG (ref 26.6–33)
MCHC RBC AUTO-ENTMCNC: 33 G/DL (ref 31.5–35.7)
MCV RBC AUTO: 92.1 FL (ref 79–97)
MONOCYTES # BLD AUTO: 0.76 10*3/MM3 (ref 0.1–0.9)
MONOCYTES NFR BLD AUTO: 6.4 % (ref 5–12)
NEUTROPHILS NFR BLD AUTO: 63.6 % (ref 42.7–76)
NEUTROPHILS NFR BLD AUTO: 7.51 10*3/MM3 (ref 1.7–7)
NRBC BLD AUTO-RTO: 0 /100 WBC (ref 0–0.2)
PLATELET # BLD AUTO: 314 10*3/MM3 (ref 140–450)
PMV BLD AUTO: 10.9 FL (ref 6–12)
POTASSIUM SERPL-SCNC: 3.7 MMOL/L (ref 3.5–5.2)
PROT SERPL-MCNC: 7.4 G/DL (ref 6–8.5)
RBC # BLD AUTO: 5.06 10*6/MM3 (ref 3.77–5.28)
SODIUM SERPL-SCNC: 140 MMOL/L (ref 136–145)
TRIGL SERPL-MCNC: 117 MG/DL (ref 0–150)
TSH SERPL DL<=0.05 MIU/L-ACNC: 2.79 UIU/ML (ref 0.27–4.2)
VLDLC SERPL-MCNC: 21 MG/DL (ref 5–40)
WBC NRBC COR # BLD: 11.82 10*3/MM3 (ref 3.4–10.8)

## 2023-04-26 PROCEDURE — 84443 ASSAY THYROID STIM HORMONE: CPT

## 2023-04-26 PROCEDURE — 85025 COMPLETE CBC W/AUTO DIFF WBC: CPT

## 2023-04-26 PROCEDURE — 36415 COLL VENOUS BLD VENIPUNCTURE: CPT

## 2023-04-26 PROCEDURE — 80061 LIPID PANEL: CPT

## 2023-04-26 PROCEDURE — 83036 HEMOGLOBIN GLYCOSYLATED A1C: CPT

## 2023-04-26 PROCEDURE — 80053 COMPREHEN METABOLIC PANEL: CPT

## 2023-04-26 PROCEDURE — 82306 VITAMIN D 25 HYDROXY: CPT

## 2023-04-26 RX ORDER — SERTRALINE HYDROCHLORIDE 100 MG/1
150 TABLET, FILM COATED ORAL NIGHTLY
Qty: 45 TABLET | Refills: 5 | Status: SHIPPED | OUTPATIENT
Start: 2023-04-26

## 2023-04-26 RX ORDER — PANTOPRAZOLE SODIUM 40 MG/1
40 TABLET, DELAYED RELEASE ORAL DAILY
Qty: 30 TABLET | Refills: 5 | Status: SHIPPED | OUTPATIENT
Start: 2023-04-26

## 2023-04-26 RX ORDER — FLUTICASONE FUROATE AND VILANTEROL 200; 25 UG/1; UG/1
1 POWDER RESPIRATORY (INHALATION) DAILY
Qty: 60 EACH | Refills: 1 | Status: SHIPPED | OUTPATIENT
Start: 2023-04-26

## 2023-04-26 RX ORDER — LORATADINE 10 MG/1
10 TABLET ORAL DAILY
Qty: 30 TABLET | Refills: 5 | Status: SHIPPED | OUTPATIENT
Start: 2023-04-26

## 2023-04-26 NOTE — PROGRESS NOTES
Chief Complaint   PT HERE TO DISCUSS BACK PAIN     SUBJECTIVE  Lindsey Mckinnon presents to [facility]     LUMBAR BACK PAIN-currently followed by pain management. Currently taking oxycodone and skelaxin daily.     GERD-taking protonix daily with good control of symptoms. Pt does not have to supplement with any additional medication.    Anxiety/depression-taking zoloft daily with good control of symptoms.    COPD-pt currently using  breo,  and spiriva. Reports she is not using her rescue inhaler. Pt continues to smoke.    HTN-currently taking losartan and bisoprolol daily with good control of blood pressure. Denies any chest pain, headache or dizziness. Pt consults cardiology, Dr. Helms.        History of Present Illness  Past Medical History:   Diagnosis Date   • Acute ST elevation myocardial infarction (STEMI) involving right coronary artery 08/25/2022   • Alcoholism    • Anemia    • Anxiety    • Arthritis    • Asthma    • Bipolar disorder    • Breast lump    • Cervical spine pain    • Chronic allergic rhinitis    • Chronic pain disorder    • COPD (chronic obstructive pulmonary disease) 04/15/2021   • Coronary artery disease    • Deep vein thrombosis    • Degenerative disc disease, cervical    • Depression    • Esophageal reflux    • Essential hypertension 04/15/2021   • Fibromyalgia    • Forgetfulness    • Gall stones    • GERD (gastroesophageal reflux disease)    • H/O emphysema    • Head injury    • Heart attack 08/2022   • Hemorrhoid    • Hernia cerebri    • Hernia, hiatal    • Herniated disc, cervical    • Hyperlipidemia 04/15/2021   • Kidney stone    • Limb swelling    • Lumbar pain    • Lumbosacral disc disease    • Migraine    • Mood disorder    • Muscle cramps    • Nicotine dependence 04/15/2021   • Peripheral neuropathy    • Reflux esophagitis    • Shortness of breath    • Spinal stenosis    • STEMI (ST elevation myocardial infarction) 08/25/2022   • Thoracic disc  disorder    • Tremor 04/15/2021      Family History   Problem Relation Age of Onset   • Depression Mother    • Bipolar disorder Mother    • Lung cancer Mother         MALIGNANT   • Diabetes Mother         UNSPECIFIED TYPE   • Cancer Mother         BLADDER   • Osteoporosis Mother    • Arthritis Mother    • Lung cancer Father         MALIGNANT   • Diabetes Father         UNSPECIFIED TYPE/ MELLITUS   • Heart attack Father    • Kidney cancer Father    • Cancer Father         BLADDER   • Arthritis Father    • Heart disease Father    • OCD Brother    • Depression Brother    • Diabetes Brother         UNSPECIFIED TYPE/MELLITUS   • Kidney cancer Brother    • Arthritis Brother    • Paranoid behavior Brother    • OCD Brother    • Arthritis Son       Past Surgical History:   Procedure Laterality Date   • CARDIAC CATHETERIZATION N/A 2022    Procedure: Left Heart Cath;  Surgeon: Adam Helms MD;  Location: [facility] INVASIVE LOCATION;  Service: Cardiovascular;  Laterality: N/A;   • CERVICAL EPIDURAL      STERIOD INJECTIONS    •  SECTION      X 2   •  SECTION  ,   • CHOLECYSTECTOMY     • ENDOSCOPY     • EPIDURAL BLOCK     • GALLBLADDER SURGERY     • HYSTERECTOMY     • HYSTERECTOMY     • LUMBAR EPIDURAL INJECTION      STERIOD   • TRIGGER POINT INJECTION          Current Outpatient Medications:   •  aspirin 81 MG chewable tablet, Chew 1 tablet Daily., Disp: 99 tablet, Rfl: 99  •  Fluticasone Furoate-Vilanterol (BREO ELLIPTA) 200-25 MCG/ACT inhaler, Inhale 1 puff Daily., Disp: 60 each, Rfl: 1  •  loratadine (CLARITIN) 10 MG tablet, Take 1 tablet by mouth Daily., Disp: 30 tablet, Rfl: 5  •  losartan (COZAAR) 25 MG tablet, TAKE 1 TABLET BY MOUTH DAILY, Disp: 90 tablet, Rfl: 3  •  oxyCODONE ER (oxyCONTIN) 15 MG tablet extended-release 12 hour, Take 1 tablet by mouth Every 12 (Twelve) Hours., Disp: , Rfl:   •  pantoprazole (PROTONIX) 40 MG EC tablet, Take 1 tablet by mouth Daily., Disp: 30  "tablet, Rfl: 5  •  sertraline (ZOLOFT) 100 MG tablet, Take 1.5 tablets by mouth Every Night., Disp: 45 tablet, Rfl: 5  •  ticagrelor (BRILINTA) 90 MG tablet tablet, Take 1 tablet by mouth 2 (Two) Times a Day. Indications: Acute Coronary Syndrome, Disp: 180 tablet, Rfl: 3  •  tiotropium (Spiriva HandiHaler) 18 MCG per inhalation capsule, Place 1 capsule into inhaler and inhale Daily., Disp: 90 capsule, Rfl: 1  •  bisoprolol (ZEBeta) 5 MG tablet, Take 1 tablet by mouth Daily for 90 days., Disp: 90 tablet, Rfl: 3  •  metaxalone (SKELAXIN) 800 MG tablet, Take 1 tablet by mouth 3 (Three) Times a Day As Needed. (Patient not taking: Reported on 4/26/2023), Disp: , Rfl:     OBJECTIVE  Vital Signs:   /87   Pulse 96   Ht 165.1 cm (65\")   Wt 88 kg (194 lb)   SpO2 96%   BMI 32.28 kg/m²    Estimated body mass index is 32.28 kg/m² as calculated from the following:    Height as of this encounter: 165.1 cm (65\").    Weight as of this encounter: 88 kg (194 lb).     Wt Readings from Last 3 Encounters:   04/26/23 88 kg (194 lb)   03/29/23 89.8 kg (198 lb)   12/12/22 96.2 kg (212 lb)     BP Readings from Last 3 Encounters:   04/26/23 124/87   03/29/23 140/80   12/12/22 121/86       Physical Exam  Vitals reviewed.   Constitutional:       Appearance: Normal appearance. She is well-developed.   HENT:      Head: Normocephalic and atraumatic.      Right Ear: External ear normal.      Left Ear: External ear normal.      Mouth/Throat:      Pharynx: No oropharyngeal exudate.   Eyes:      Conjunctiva/sclera: Conjunctivae normal.      Pupils: Pupils are equal, round, and reactive to light.   Neck:      Vascular: No carotid bruit.   Cardiovascular:      Rate and Rhythm: Normal rate and regular rhythm.      Pulses: Normal pulses.      Heart sounds: Normal heart sounds. No murmur heard.    No friction rub. No gallop.   Pulmonary:      Effort: Pulmonary effort is normal.      Breath sounds: Rhonchi present. No wheezing.   Skin:     " General: Skin is warm and dry.   Neurological:      Mental Status: She is alert and oriented to person, place, and time.      Cranial Nerves: No cranial nerve deficit.   Psychiatric:         Mood and Affect: Mood and affect normal.         Behavior: Behavior normal.         Thought Content: Thought content normal.         Judgment: Judgment normal.          Result Review        XR Spine Thoracic 3 View    Result Date: 2/1/2023    1. Multilevel degenerative disc disease of the thoracic spine most notably involving the lower thoracic spine.  No evidence of compression fracture.      SPEEDY REBOLLEDO MD       Electronically Signed and Approved By: SPEEDY REBOLLEDO MD on 2/01/2023 at 11:48             XR Spine Lumbar 2 or 3 View    Result Date: 3/16/2023   Mild-to-moderate multilevel degenerative changes particularly at the lumbosacral junction.  These have slightly progressed radiographically since 2018.     CONI MCELROY DO       Electronically Signed and Approved By: CONI MCELROY DO on 3/16/2023 at 11:22             XR Chest PA & Lateral    Result Date: 2/1/2023    No acute cardiopulmonary process.      YSUUF HURLEY MD       Electronically Signed and Approved By: YUSUF HURLEY MD on 2/01/2023 at 11:18                The above data has been reviewed by SOFIA Grullon 04/26/2023 11:12 EDT.          Patient Care Team:  Mady Mcnally APRN as PCP - General (Family Medicine)  Arminda Arteaga APRN as Nurse Practitioner (Pain Medicine)  Adam Helms MD as Consulting Physician (Cardiology)  Franco Landa PA-C as Physician Assistant (Physician Assistant)    BMI is >= 30 and <35. (Class 1 Obesity). The following options were offered after discussion;: weight loss educational material (shared in after visit summary)       ASSESSMENT & PLAN    Diagnoses and all orders for this visit:    1. COPD on long-term inhaled steroid therapy (Primary)  Comments:  Continue inhalers, will check chest x-ray, encourage  smoking cessation.  Orders:  -     Fluticasone Furoate-Vilanterol (BREO ELLIPTA) 200-25 MCG/ACT inhaler; Inhale 1 puff Daily.  Dispense: 60 each; Refill: 1  -     tiotropium (Spiriva HandiHaler) 18 MCG per inhalation capsule; Place 1 capsule into inhaler and inhale Daily.  Dispense: 90 capsule; Refill: 1    2. Gastroesophageal reflux disease with esophagitis without hemorrhage  Comments:  Stable on protonix 40mg daily  Orders:  -     pantoprazole (PROTONIX) 40 MG EC tablet; Take 1 tablet by mouth Daily.  Dispense: 30 tablet; Refill: 5    3. Allergic rhinitis, unspecified seasonality, unspecified trigger  -     loratadine (CLARITIN) 10 MG tablet; Take 1 tablet by mouth Daily.  Dispense: 30 tablet; Refill: 5    4. Coronary artery disease involving native coronary artery of native heart without angina pectoris  -     US AAA Screen Limited; Future  -     ticagrelor (BRILINTA) 90 MG tablet tablet; Take 1 tablet by mouth 2 (Two) Times a Day. Indications: Acute Coronary Syndrome  Dispense: 180 tablet; Refill: 3  -     Comprehensive Metabolic Panel; Future  -     CBC & Differential; Future    5. Anxiety  Comments:  Well-controlled with Zoloft, continue current medication  Orders:  -     sertraline (ZOLOFT) 100 MG tablet; Take 1.5 tablets by mouth Every Night.  Dispense: 45 tablet; Refill: 5    6. Current mild episode of major depressive disorder without prior episode  Comments:  Well-controlled with Zoloft, continue current medication  Orders:  -     sertraline (ZOLOFT) 100 MG tablet; Take 1.5 tablets by mouth Every Night.  Dispense: 45 tablet; Refill: 5    7. Vitamin D deficiency  -     Vitamin D,25-Hydroxy; Future  -     Vitamin D 25 hydroxy; Future    8. Screening for thyroid disorder  -     TSH; Future    9. Mixed hyperlipidemia  -     Lipid Panel; Future    10. Elevated hemoglobin A1c  -     Hemoglobin A1c; Future    11. Tobacco abuse  Comments:  encouraged smoking cessation     Lindsey Mckinnon  reports that she  has been smoking cigarettes. She has a 7.50 pack-year smoking history. She has never used smokeless tobacco.. I have educated her on the risk of diseases from using tobacco products such as cancer, COPD and heart disease.     I advised her to quit and she is not willing to quit.    I spent 10 minutes counseling the patient.           Tobacco Use: High Risk   • Smoking Tobacco Use: Every Day   • Smokeless Tobacco Use: Never   • Passive Exposure: Not on file       Follow Up     Return in about 4 months (around 8/26/2023).        Patient was given instructions and counseling regarding her condition or for health maintenance advice. Please see specific information pulled into the AVS if appropriate.   I have reviewed information obtained and documented by others and I have confirmed the accuracy of this documented note.    Mady Mcnally APRN

## 2023-04-28 ENCOUNTER — TELEPHONE (OUTPATIENT)
Dept: CARDIOLOGY | Facility: CLINIC | Age: 58
End: 2023-04-28
Payer: COMMERCIAL

## 2023-04-28 NOTE — TELEPHONE ENCOUNTER
----- Message from SOFIA Burnham sent at 4/27/2023  8:18 PM EDT -----  Cholesterol levels have increased significantly, please check and see if she is up to atorvastatin?

## 2023-05-03 ENCOUNTER — HOSPITAL ENCOUNTER (OUTPATIENT)
Dept: ULTRASOUND IMAGING | Facility: HOSPITAL | Age: 58
Discharge: HOME | End: 2023-05-03
Admitting: NURSE PRACTITIONER
Payer: COMMERCIAL

## 2023-05-03 DIAGNOSIS — I25.10 CORONARY ARTERY DISEASE INVOLVING NATIVE CORONARY ARTERY OF NATIVE HEART WITHOUT ANGINA PECTORIS: ICD-10-CM

## 2023-05-03 PROCEDURE — 76706 US ABDL AORTA SCREEN AAA: CPT

## 2023-05-04 ENCOUNTER — HOSPITAL ENCOUNTER (OUTPATIENT)
Dept: MRI IMAGING | Facility: HOSPITAL | Age: 58
Discharge: HOME | End: 2023-05-04
Payer: COMMERCIAL

## 2023-05-04 DIAGNOSIS — M51.36 DDD (DEGENERATIVE DISC DISEASE), LUMBAR: ICD-10-CM

## 2023-05-04 DIAGNOSIS — M54.42 CHRONIC MIDLINE LOW BACK PAIN WITH BILATERAL SCIATICA: ICD-10-CM

## 2023-05-04 DIAGNOSIS — G89.29 CHRONIC MIDLINE LOW BACK PAIN WITH BILATERAL SCIATICA: ICD-10-CM

## 2023-05-04 DIAGNOSIS — M54.41 CHRONIC MIDLINE LOW BACK PAIN WITH BILATERAL SCIATICA: ICD-10-CM

## 2023-05-04 PROCEDURE — 72158 MRI LUMBAR SPINE W/O & W/DYE: CPT

## 2023-05-04 PROCEDURE — A9577 INJ MULTIHANCE: HCPCS | Performed by: PHYSICIAN ASSISTANT

## 2023-05-04 PROCEDURE — 0 GADOBENATE DIMEGLUMINE 529 MG/ML SOLUTION: Performed by: PHYSICIAN ASSISTANT

## 2023-05-04 RX ADMIN — GADOBENATE DIMEGLUMINE 18 ML: 529 INJECTION, SOLUTION INTRAVENOUS at 15:14

## 2023-05-10 ENCOUNTER — OFFICE VISIT (OUTPATIENT)
Dept: NEUROSURGERY | Facility: CLINIC | Age: 58
End: 2023-05-10
Payer: COMMERCIAL

## 2023-05-10 VITALS
BODY MASS INDEX: 31.82 KG/M2 | WEIGHT: 191 LBS | HEART RATE: 111 BPM | DIASTOLIC BLOOD PRESSURE: 92 MMHG | SYSTOLIC BLOOD PRESSURE: 133 MMHG | HEIGHT: 65 IN

## 2023-05-10 DIAGNOSIS — M51.36 DDD (DEGENERATIVE DISC DISEASE), LUMBAR: ICD-10-CM

## 2023-05-10 DIAGNOSIS — M54.42 CHRONIC MIDLINE LOW BACK PAIN WITH BILATERAL SCIATICA: ICD-10-CM

## 2023-05-10 DIAGNOSIS — M54.41 CHRONIC MIDLINE LOW BACK PAIN WITH BILATERAL SCIATICA: ICD-10-CM

## 2023-05-10 DIAGNOSIS — M47.816 LUMBAR FACET ARTHROPATHY: Primary | ICD-10-CM

## 2023-05-10 DIAGNOSIS — G89.29 CHRONIC MIDLINE LOW BACK PAIN WITH BILATERAL SCIATICA: ICD-10-CM

## 2023-05-10 NOTE — PROGRESS NOTES
Patient being seen for today for Follow-up  .    Subjective    Lindsey Mckinnon is a 57 y.o. female that presents with Follow-up  .    HPI  Previously: Last seen on 3/29/2023 for pain in the lower back and down both legs to the calves.  She had degenerative changes on the x-ray of lumbar spine from 3/15/2023.  She had previous MRI in 2021 showing lumbar spine stenosis at L4-L5 and L5-S1 in particular.  There was an order for new MRI of the lumbar spine without contrast to evaluate for worsening lumbar pathology.    Today: She rates her pain 5/10. She denies any new complaints.     reports that she has been smoking cigarettes. She has a 7.50 pack-year smoking history. She has never used smokeless tobacco.    Review of Systems   Musculoskeletal: Positive for back pain.   Neurological: Positive for weakness and numbness.       Objective   Vitals:    05/10/23 1402   BP: 133/92   Pulse: 111        Physical Exam  Constitutional:       Appearance: Normal appearance. She is obese.   Pulmonary:      Effort: Pulmonary effort is normal.   Musculoskeletal:         General: Tenderness (midline lumbar spine and bilateral lumbar paraspinals) present.      Comments: SLR negative bilaterally   Neurological:      General: No focal deficit present.      Mental Status: She is alert and oriented to person, place, and time.      Sensory: No sensory deficit.      Motor: No weakness.      Deep Tendon Reflexes: Reflexes normal.   Psychiatric:         Mood and Affect: Mood normal.         Behavior: Behavior normal.          Result Review   I personally reviewed the MRI of lumbar spine without contrast from 5/4/2023 showing severe facet arthritis on the right at L5-S1 with possible reactive enhancement and signal abnormality which is most likely inflammatory in nature.  There is also moderate to severe right foraminal narrowing at L5-S1.  Otherwise there is no high-grade central canal or foraminal stenosis in the lumbar spine.      Assessment and Plan {CC Problem List  Visit Diagnosis  ROS  Review (Popup)  [facility]  Quality  BestPractice  Medications  SmartSets  SnapShot Encounters  Media :23}   Diagnoses and all orders for this visit:    1. Lumbar facet arthropathy (Primary)    2. DDD (degenerative disc disease), lumbar    3. Chronic midline low back pain with bilateral sciatica    Her pain continues to be in the back and down the legs to the knees.    She has severe facet arthritis, especially at L5-S1 on the right. We did discuss that we cannot rule out infectious arthropathy on the right at L5-S1. We discussed possible CRP and ESR lab testing to assess further. She is deferring today.    We discussed her treatment options including PT, LESB vs MBBs/RFA in the lumbar spine, and spinal surgery.    She has tried and failed most conservative treatment. She is not at a place where she would want to consider surgery.    The patient was counseled on basic recommendations for the reduction and prevention of back, neck, or spine pain in association with spinal disorders, including: cessation/avoidance of nicotine use, maintenance of a healthy BMI and weight, focusing on building/maintaining core strength through core exercise, and avoidance of activities which worsen the pain. The patient will monitor for changes in symptoms and notify our clinic of these changes as needed.    She will follow-up here PRN for failure to improve or worsening symptoms.  Follow Up {Instructions Charge Capture  Follow-up Communications :23}   Return if symptoms worsen or fail to improve.

## 2023-06-07 DIAGNOSIS — Z79.51 COPD ON LONG-TERM INHALED STEROID THERAPY: ICD-10-CM

## 2023-06-07 DIAGNOSIS — J44.9 COPD ON LONG-TERM INHALED STEROID THERAPY: ICD-10-CM

## 2023-06-09 RX ORDER — TIOTROPIUM BROMIDE 18 UG/1
CAPSULE ORAL; RESPIRATORY (INHALATION)
Qty: 90 CAPSULE | Refills: 1 | Status: SHIPPED | OUTPATIENT
Start: 2023-06-09

## 2023-07-31 ENCOUNTER — TRANSCRIBE ORDERS (OUTPATIENT)
Dept: ADMINISTRATIVE | Facility: HOSPITAL | Age: 58
End: 2023-07-31
Payer: COMMERCIAL

## 2023-07-31 DIAGNOSIS — M43.02 CERVICAL SPONDYLOLYSIS: Primary | ICD-10-CM

## 2023-08-25 ENCOUNTER — LAB (OUTPATIENT)
Dept: LAB | Facility: HOSPITAL | Age: 58
End: 2023-08-25
Payer: COMMERCIAL

## 2023-08-25 DIAGNOSIS — E78.2 MIXED HYPERLIPIDEMIA: ICD-10-CM

## 2023-08-25 DIAGNOSIS — I25.10 CORONARY ARTERY DISEASE INVOLVING NATIVE CORONARY ARTERY OF NATIVE HEART WITHOUT ANGINA PECTORIS: ICD-10-CM

## 2023-08-25 LAB
ALBUMIN SERPL-MCNC: 4.2 G/DL (ref 3.5–5.2)
ALBUMIN/GLOB SERPL: 1.5 G/DL
ALP SERPL-CCNC: 77 U/L (ref 39–117)
ALT SERPL W P-5'-P-CCNC: 12 U/L (ref 1–33)
ANION GAP SERPL CALCULATED.3IONS-SCNC: 10.2 MMOL/L (ref 5–15)
AST SERPL-CCNC: 13 U/L (ref 1–32)
BILIRUB SERPL-MCNC: 0.3 MG/DL (ref 0–1.2)
BUN SERPL-MCNC: 13 MG/DL (ref 6–20)
BUN/CREAT SERPL: 18.1 (ref 7–25)
CALCIUM SPEC-SCNC: 9.9 MG/DL (ref 8.6–10.5)
CHLORIDE SERPL-SCNC: 105 MMOL/L (ref 98–107)
CO2 SERPL-SCNC: 26.8 MMOL/L (ref 22–29)
CREAT SERPL-MCNC: 0.72 MG/DL (ref 0.57–1)
EGFRCR SERPLBLD CKD-EPI 2021: 97.1 ML/MIN/1.73
GLOBULIN UR ELPH-MCNC: 2.8 GM/DL
GLUCOSE SERPL-MCNC: 84 MG/DL (ref 65–99)
POTASSIUM SERPL-SCNC: 4.5 MMOL/L (ref 3.5–5.2)
PROT SERPL-MCNC: 7 G/DL (ref 6–8.5)
SODIUM SERPL-SCNC: 142 MMOL/L (ref 136–145)

## 2023-08-25 PROCEDURE — 80053 COMPREHEN METABOLIC PANEL: CPT

## 2023-08-25 PROCEDURE — 36415 COLL VENOUS BLD VENIPUNCTURE: CPT

## 2023-08-28 ENCOUNTER — TELEPHONE (OUTPATIENT)
Dept: CARDIOLOGY | Facility: CLINIC | Age: 58
End: 2023-08-28
Payer: COMMERCIAL

## 2023-08-28 DIAGNOSIS — E78.2 MIXED HYPERLIPIDEMIA: Primary | ICD-10-CM

## 2023-08-28 NOTE — TELEPHONE ENCOUNTER
----- Message from SOFIA Burnham sent at 8/28/2023  3:43 PM EDT -----  Her lab work shows normal electrolytes, renal function and liver enzymes.  There was not a cholesterol panel checked on this lab work, please see if lab can add on a lipid, if not please asked patient to have a fasting lipid profile drawn a few days prior to her appointment with Dr. Helms next month.

## 2023-08-29 ENCOUNTER — LAB (OUTPATIENT)
Dept: LAB | Facility: HOSPITAL | Age: 58
End: 2023-08-29
Payer: COMMERCIAL

## 2023-08-29 ENCOUNTER — TRANSCRIBE ORDERS (OUTPATIENT)
Dept: LAB | Facility: HOSPITAL | Age: 58
End: 2023-08-29
Payer: COMMERCIAL

## 2023-08-29 ENCOUNTER — OFFICE VISIT (OUTPATIENT)
Dept: FAMILY MEDICINE CLINIC | Facility: CLINIC | Age: 58
End: 2023-08-29
Payer: COMMERCIAL

## 2023-08-29 VITALS
HEIGHT: 65 IN | OXYGEN SATURATION: 96 % | SYSTOLIC BLOOD PRESSURE: 130 MMHG | BODY MASS INDEX: 34.22 KG/M2 | WEIGHT: 205.4 LBS | DIASTOLIC BLOOD PRESSURE: 80 MMHG | HEART RATE: 88 BPM

## 2023-08-29 DIAGNOSIS — K21.00 GASTROESOPHAGEAL REFLUX DISEASE WITH ESOPHAGITIS WITHOUT HEMORRHAGE: Chronic | ICD-10-CM

## 2023-08-29 DIAGNOSIS — F41.9 ANXIETY: ICD-10-CM

## 2023-08-29 DIAGNOSIS — F32.0 CURRENT MILD EPISODE OF MAJOR DEPRESSIVE DISORDER WITHOUT PRIOR EPISODE: ICD-10-CM

## 2023-08-29 DIAGNOSIS — E78.5 HYPERLIPIDEMIA, UNSPECIFIED HYPERLIPIDEMIA TYPE: ICD-10-CM

## 2023-08-29 DIAGNOSIS — R35.0 URINARY FREQUENCY: ICD-10-CM

## 2023-08-29 DIAGNOSIS — Z78.0 MENOPAUSE: ICD-10-CM

## 2023-08-29 DIAGNOSIS — Z72.0 TOBACCO ABUSE: ICD-10-CM

## 2023-08-29 DIAGNOSIS — Z00.00 ANNUAL PHYSICAL EXAM: ICD-10-CM

## 2023-08-29 DIAGNOSIS — I10 ESSENTIAL HYPERTENSION: ICD-10-CM

## 2023-08-29 DIAGNOSIS — Z79.51 COPD ON LONG-TERM INHALED STEROID THERAPY: ICD-10-CM

## 2023-08-29 DIAGNOSIS — R73.09 ELEVATED HEMOGLOBIN A1C: ICD-10-CM

## 2023-08-29 DIAGNOSIS — J44.9 COPD ON LONG-TERM INHALED STEROID THERAPY: ICD-10-CM

## 2023-08-29 DIAGNOSIS — J30.9 ALLERGIC RHINITIS, UNSPECIFIED SEASONALITY, UNSPECIFIED TRIGGER: ICD-10-CM

## 2023-08-29 DIAGNOSIS — E78.5 HYPERLIPIDEMIA, UNSPECIFIED HYPERLIPIDEMIA TYPE: Primary | ICD-10-CM

## 2023-08-29 DIAGNOSIS — Z12.31 SCREENING MAMMOGRAM, ENCOUNTER FOR: ICD-10-CM

## 2023-08-29 DIAGNOSIS — I10 ESSENTIAL HYPERTENSION, MALIGNANT: ICD-10-CM

## 2023-08-29 DIAGNOSIS — Z00.00 ANNUAL PHYSICAL EXAM: Primary | ICD-10-CM

## 2023-08-29 LAB
ALBUMIN SERPL-MCNC: 4.1 G/DL (ref 3.5–5.2)
ALBUMIN UR-MCNC: <1.2 MG/DL
ALBUMIN/GLOB SERPL: 1.4 G/DL
ALP SERPL-CCNC: 74 U/L (ref 39–117)
ALT SERPL W P-5'-P-CCNC: 15 U/L (ref 1–33)
ANION GAP SERPL CALCULATED.3IONS-SCNC: 11.7 MMOL/L (ref 5–15)
AST SERPL-CCNC: 15 U/L (ref 1–32)
BACTERIA UR QL AUTO: ABNORMAL /HPF
BASOPHILS # BLD AUTO: 0.07 10*3/MM3 (ref 0–0.2)
BASOPHILS NFR BLD AUTO: 0.7 % (ref 0–1.5)
BILIRUB BLD-MCNC: NEGATIVE MG/DL
BILIRUB SERPL-MCNC: <0.2 MG/DL (ref 0–1.2)
BILIRUB UR QL STRIP: NEGATIVE
BUN SERPL-MCNC: 16 MG/DL (ref 6–20)
BUN/CREAT SERPL: 18.6 (ref 7–25)
CALCIUM SPEC-SCNC: 9.4 MG/DL (ref 8.6–10.5)
CHLORIDE SERPL-SCNC: 104 MMOL/L (ref 98–107)
CHOLEST SERPL-MCNC: 245 MG/DL (ref 0–200)
CLARITY UR: CLEAR
CLARITY, POC: CLEAR
CO2 SERPL-SCNC: 24.3 MMOL/L (ref 22–29)
COLOR UR: YELLOW
COLOR UR: YELLOW
CREAT SERPL-MCNC: 0.86 MG/DL (ref 0.57–1)
CREAT UR-MCNC: 46.9 MG/DL
DEPRECATED RDW RBC AUTO: 45.4 FL (ref 37–54)
EGFRCR SERPLBLD CKD-EPI 2021: 78.4 ML/MIN/1.73
EOSINOPHIL # BLD AUTO: 0.22 10*3/MM3 (ref 0–0.4)
EOSINOPHIL NFR BLD AUTO: 2.2 % (ref 0.3–6.2)
ERYTHROCYTE [DISTWIDTH] IN BLOOD BY AUTOMATED COUNT: 13.1 % (ref 12.3–15.4)
EXPIRATION DATE: ABNORMAL
GLOBULIN UR ELPH-MCNC: 3 GM/DL
GLUCOSE SERPL-MCNC: 83 MG/DL (ref 65–99)
GLUCOSE UR STRIP-MCNC: NEGATIVE MG/DL
GLUCOSE UR STRIP-MCNC: NEGATIVE MG/DL
HCT VFR BLD AUTO: 42.1 % (ref 34–46.6)
HDLC SERPL-MCNC: 50 MG/DL (ref 40–60)
HGB BLD-MCNC: 14 G/DL (ref 12–15.9)
HGB UR QL STRIP.AUTO: ABNORMAL
HYALINE CASTS UR QL AUTO: ABNORMAL /LPF
IMM GRANULOCYTES # BLD AUTO: 0.04 10*3/MM3 (ref 0–0.05)
IMM GRANULOCYTES NFR BLD AUTO: 0.4 % (ref 0–0.5)
KETONES UR QL STRIP: NEGATIVE
KETONES UR QL: NEGATIVE
LDLC SERPL CALC-MCNC: 176 MG/DL (ref 0–100)
LDLC/HDLC SERPL: 3.47 {RATIO}
LEUKOCYTE EST, POC: NEGATIVE
LEUKOCYTE ESTERASE UR QL STRIP.AUTO: ABNORMAL
LYMPHOCYTES # BLD AUTO: 2.74 10*3/MM3 (ref 0.7–3.1)
LYMPHOCYTES NFR BLD AUTO: 27.1 % (ref 19.6–45.3)
Lab: ABNORMAL
MCH RBC QN AUTO: 31.5 PG (ref 26.6–33)
MCHC RBC AUTO-ENTMCNC: 33.3 G/DL (ref 31.5–35.7)
MCV RBC AUTO: 94.6 FL (ref 79–97)
MICROALBUMIN/CREAT UR: NORMAL MG/G{CREAT}
MONOCYTES # BLD AUTO: 0.74 10*3/MM3 (ref 0.1–0.9)
MONOCYTES NFR BLD AUTO: 7.3 % (ref 5–12)
NEUTROPHILS NFR BLD AUTO: 6.31 10*3/MM3 (ref 1.7–7)
NEUTROPHILS NFR BLD AUTO: 62.3 % (ref 42.7–76)
NITRITE UR QL STRIP: NEGATIVE
NITRITE UR-MCNC: NEGATIVE MG/ML
NRBC BLD AUTO-RTO: 0 /100 WBC (ref 0–0.2)
PH UR STRIP.AUTO: 6 [PH] (ref 5–8)
PH UR: 6 [PH] (ref 5–8)
PLATELET # BLD AUTO: 327 10*3/MM3 (ref 140–450)
PMV BLD AUTO: 10.6 FL (ref 6–12)
POTASSIUM SERPL-SCNC: 4.2 MMOL/L (ref 3.5–5.2)
PROT SERPL-MCNC: 7.1 G/DL (ref 6–8.5)
PROT UR QL STRIP: NEGATIVE
PROT UR STRIP-MCNC: NEGATIVE MG/DL
RBC # BLD AUTO: 4.45 10*6/MM3 (ref 3.77–5.28)
RBC # UR STRIP: ABNORMAL /HPF
RBC # UR STRIP: ABNORMAL /UL
REF LAB TEST METHOD: ABNORMAL
SODIUM SERPL-SCNC: 140 MMOL/L (ref 136–145)
SP GR UR STRIP: 1.01 (ref 1–1.03)
SP GR UR: 1.01 (ref 1–1.03)
SQUAMOUS #/AREA URNS HPF: ABNORMAL /HPF
TRIGL SERPL-MCNC: 108 MG/DL (ref 0–150)
TSH SERPL DL<=0.05 MIU/L-ACNC: 3.24 UIU/ML (ref 0.27–4.2)
UROBILINOGEN UR QL STRIP: ABNORMAL
UROBILINOGEN UR QL: ABNORMAL
VLDLC SERPL-MCNC: 19 MG/DL (ref 5–40)
WBC # UR STRIP: ABNORMAL /HPF
WBC NRBC COR # BLD: 10.12 10*3/MM3 (ref 3.4–10.8)

## 2023-08-29 PROCEDURE — 3079F DIAST BP 80-89 MM HG: CPT | Performed by: NURSE PRACTITIONER

## 2023-08-29 PROCEDURE — 85025 COMPLETE CBC W/AUTO DIFF WBC: CPT

## 2023-08-29 PROCEDURE — 36415 COLL VENOUS BLD VENIPUNCTURE: CPT

## 2023-08-29 PROCEDURE — 87086 URINE CULTURE/COLONY COUNT: CPT | Performed by: NURSE PRACTITIONER

## 2023-08-29 PROCEDURE — 1159F MED LIST DOCD IN RCRD: CPT | Performed by: NURSE PRACTITIONER

## 2023-08-29 PROCEDURE — 81001 URINALYSIS AUTO W/SCOPE: CPT | Performed by: NURSE PRACTITIONER

## 2023-08-29 PROCEDURE — 1160F RVW MEDS BY RX/DR IN RCRD: CPT | Performed by: NURSE PRACTITIONER

## 2023-08-29 PROCEDURE — 84443 ASSAY THYROID STIM HORMONE: CPT

## 2023-08-29 PROCEDURE — 83036 HEMOGLOBIN GLYCOSYLATED A1C: CPT

## 2023-08-29 PROCEDURE — 82043 UR ALBUMIN QUANTITATIVE: CPT

## 2023-08-29 PROCEDURE — 99396 PREV VISIT EST AGE 40-64: CPT | Performed by: NURSE PRACTITIONER

## 2023-08-29 PROCEDURE — 80053 COMPREHEN METABOLIC PANEL: CPT

## 2023-08-29 PROCEDURE — 80061 LIPID PANEL: CPT

## 2023-08-29 PROCEDURE — 82570 ASSAY OF URINE CREATININE: CPT

## 2023-08-29 PROCEDURE — 81003 URINALYSIS AUTO W/O SCOPE: CPT | Performed by: NURSE PRACTITIONER

## 2023-08-29 PROCEDURE — 3075F SYST BP GE 130 - 139MM HG: CPT | Performed by: NURSE PRACTITIONER

## 2023-08-29 RX ORDER — OXYCODONE HYDROCHLORIDE 15 MG/1
15 TABLET, FILM COATED, EXTENDED RELEASE ORAL EVERY 12 HOURS SCHEDULED
Status: CANCELLED | OUTPATIENT
Start: 2023-08-29

## 2023-08-29 RX ORDER — FLUTICASONE FUROATE AND VILANTEROL 200; 25 UG/1; UG/1
1 POWDER RESPIRATORY (INHALATION) DAILY
Qty: 60 EACH | Refills: 1 | Status: SHIPPED | OUTPATIENT
Start: 2023-08-29

## 2023-08-29 RX ORDER — BISOPROLOL FUMARATE 5 MG/1
5 TABLET, FILM COATED ORAL DAILY
Qty: 90 TABLET | Refills: 3 | Status: CANCELLED | OUTPATIENT
Start: 2023-08-29 | End: 2023-11-27

## 2023-08-29 RX ORDER — ASPIRIN 81 MG/1
81 TABLET, CHEWABLE ORAL DAILY
Qty: 90 TABLET | Refills: 1 | Status: SHIPPED | OUTPATIENT
Start: 2023-08-29

## 2023-08-29 RX ORDER — PANTOPRAZOLE SODIUM 40 MG/1
40 TABLET, DELAYED RELEASE ORAL DAILY
Qty: 90 TABLET | Refills: 1 | Status: SHIPPED | OUTPATIENT
Start: 2023-08-29

## 2023-08-29 RX ORDER — LOSARTAN POTASSIUM 50 MG/1
50 TABLET ORAL DAILY
Qty: 90 TABLET | Refills: 1 | Status: SHIPPED | OUTPATIENT
Start: 2023-08-29

## 2023-08-29 RX ORDER — SERTRALINE HYDROCHLORIDE 100 MG/1
150 TABLET, FILM COATED ORAL NIGHTLY
Qty: 135 TABLET | Refills: 1 | Status: SHIPPED | OUTPATIENT
Start: 2023-08-29

## 2023-08-29 RX ORDER — LOSARTAN POTASSIUM 25 MG/1
25 TABLET ORAL
Qty: 90 TABLET | Refills: 3 | Status: CANCELLED | OUTPATIENT
Start: 2023-08-29

## 2023-08-29 RX ORDER — LORATADINE 10 MG/1
10 TABLET ORAL DAILY
Qty: 90 TABLET | Refills: 1 | Status: SHIPPED | OUTPATIENT
Start: 2023-08-29

## 2023-08-29 RX ORDER — METAXALONE 800 MG/1
800 TABLET ORAL 3 TIMES DAILY PRN
Status: CANCELLED | OUTPATIENT
Start: 2023-08-29

## 2023-08-29 NOTE — PROGRESS NOTES
Chief Complaint    Annual physical exam with lab  Follow-up, Hypertension, Heartburn, and COPD    SUBJECTIVE  Lindsey Mckinnon presents to [facility]     Annual physical exam with lab    History of Present Illness  Pt here for f/u copd, Htn, gerd. Pt having frequency urine, craving sweets. Got a UA no blood in urine. Pt states B/P has been elevated at home. Pt did have a h/a this AM. Pt does need refills on medications.         Pt due for vaccines    Pt c/o urinary frequency.  Patient states she feels like she is emptying her bladder completely.  Denies any burning.    Patient states her blood pressure has been running elevated at home.  She was prescribed 25 mg of losartan, but she reports that she takes sometimes 50-75 depending on how her blood pressure is running.  She states she knows her blood pressure is elevated when she has the dull headache.  Past Medical History:   Diagnosis Date    Acute ST elevation myocardial infarction (STEMI) involving right coronary artery 08/25/2022    Alcoholism     Anemia     Anxiety     Arthritis     Asthma     Bipolar disorder     Breast lump     Cervical spine pain     Chronic allergic rhinitis     Chronic pain disorder     COPD (chronic obstructive pulmonary disease) 04/15/2021    Coronary artery disease     Deep vein thrombosis     Degenerative disc disease, cervical     Depression     Esophageal reflux     Essential hypertension 04/15/2021    Fibromyalgia     Forgetfulness     Gall stones     GERD (gastroesophageal reflux disease)     H/O emphysema     Head injury     Heart attack 08/2022    Hemorrhoid     Hernia cerebri     Hernia, hiatal     Herniated disc, cervical     Hyperlipidemia 04/15/2021    Kidney stone     Limb swelling     Lumbar pain     Lumbosacral disc disease     Migraine     Mood disorder     Muscle cramps     Nicotine dependence 04/15/2021    Peripheral neuropathy     Reflux esophagitis     Shortness of breath      Spinal stenosis     STEMI (ST elevation myocardial infarction) 2022    Thoracic disc disorder     Tremor 04/15/2021      Family History   Problem Relation Age of Onset    Depression Mother     Bipolar disorder Mother     Lung cancer Mother         MALIGNANT    Diabetes Mother         UNSPECIFIED TYPE    Cancer Mother         BLADDER    Osteoporosis Mother     Arthritis Mother     Lung cancer Father         MALIGNANT    Diabetes Father         UNSPECIFIED TYPE/ MELLITUS    Heart attack Father     Kidney cancer Father     Cancer Father         BLADDER    Arthritis Father     Heart disease Father     OCD Brother     Depression Brother     Diabetes Brother         UNSPECIFIED TYPE/MELLITUS    Kidney cancer Brother     Arthritis Brother     Paranoid behavior Brother     OCD Brother     Arthritis Son       Past Surgical History:   Procedure Laterality Date    CARDIAC CATHETERIZATION N/A 2022    Procedure: Left Heart Cath;  Surgeon: Adam Helms MD;  Location: [facility] CATH INVASIVE LOCATION;  Service: Cardiovascular;  Laterality: N/A;    CAROTID STENT      CERVICAL EPIDURAL      STERIOD INJECTIONS      SECTION      X 2     SECTION  ,    CHOLECYSTECTOMY      ENDOSCOPY      EPIDURAL BLOCK      GALLBLADDER SURGERY      HYSTERECTOMY      HYSTERECTOMY      LUMBAR EPIDURAL INJECTION      STERIOD    TRIGGER POINT INJECTION      VASCULAR SURGERY          Current Outpatient Medications:     aspirin 81 MG chewable tablet, Chew 1 tablet Daily., Disp: 90 tablet, Rfl: 1    Fluticasone Furoate-Vilanterol (BREO ELLIPTA) 200-25 MCG/ACT inhaler, Inhale 1 puff Daily., Disp: 60 each, Rfl: 1    loratadine (CLARITIN) 10 MG tablet, Take 1 tablet by mouth Daily., Disp: 90 tablet, Rfl: 1    metaxalone (SKELAXIN) 800 MG tablet, Take 1 tablet by mouth 3 (Three) Times a Day As Needed., Disp: , Rfl:     oxyCODONE ER (oxyCONTIN) 15 MG tablet extended-release 12 hour, Take 1 tablet by mouth Every 12  "(Twelve) Hours., Disp: , Rfl:     pantoprazole (PROTONIX) 40 MG EC tablet, Take 1 tablet by mouth Daily., Disp: 90 tablet, Rfl: 1    sertraline (ZOLOFT) 100 MG tablet, Take 1.5 tablets by mouth Every Night., Disp: 135 tablet, Rfl: 1    ticagrelor (BRILINTA) 90 MG tablet tablet, Take 1 tablet by mouth 2 (Two) Times a Day. Indications: Acute Coronary Syndrome, Disp: 180 tablet, Rfl: 3    tiotropium (Spiriva HandiHaler) 18 MCG per inhalation capsule, Place 1 capsule into inhaler and inhale Daily., Disp: 90 capsule, Rfl: 1    bisoprolol (ZEBeta) 5 MG tablet, Take 1 tablet by mouth Daily for 90 days., Disp: 90 tablet, Rfl: 3    losartan (Cozaar) 50 MG tablet, Take 1 tablet by mouth Daily., Disp: 90 tablet, Rfl: 1    OBJECTIVE  Vital Signs:   /80   Pulse 88   Ht 165.1 cm (65\")   Wt 93.2 kg (205 lb 6.4 oz)   SpO2 96%   BMI 34.18 kg/mý    Estimated body mass index is 34.18 kg/mý as calculated from the following:    Height as of this encounter: 165.1 cm (65\").    Weight as of this encounter: 93.2 kg (205 lb 6.4 oz).     Wt Readings from Last 3 Encounters:   08/29/23 93.2 kg (205 lb 6.4 oz)   05/10/23 86.6 kg (191 lb)   04/26/23 88 kg (194 lb)     BP Readings from Last 3 Encounters:   08/29/23 130/80   05/10/23 133/92   04/26/23 124/87       Physical Exam  Vitals reviewed.   Constitutional:       Appearance: Normal appearance. She is well-developed.   HENT:      Head: Normocephalic and atraumatic.      Right Ear: External ear normal.      Left Ear: External ear normal.      Mouth/Throat:      Pharynx: No oropharyngeal exudate.   Eyes:      Conjunctiva/sclera: Conjunctivae normal.      Pupils: Pupils are equal, round, and reactive to light.   Cardiovascular:      Rate and Rhythm: Normal rate and regular rhythm.      Pulses: Normal pulses.      Heart sounds: Normal heart sounds. No murmur heard.    No friction rub. No gallop.   Pulmonary:      Effort: Pulmonary effort is normal.      Breath sounds: Wheezing present. " No rhonchi.   Skin:     General: Skin is warm and dry.   Neurological:      Mental Status: She is alert and oriented to person, place, and time.      Cranial Nerves: No cranial nerve deficit.   Psychiatric:         Mood and Affect: Mood and affect normal.         Behavior: Behavior normal.         Thought Content: Thought content normal.         Judgment: Judgment normal.        Result Review        US aaa screen limited    Result Date: 5/3/2023   Proximal abdominal aorta is mildly aneurysmal measuring up to 3 centimetres.  The Society for vascular surgery recommends 3 year interval follow-up for aneurysm of this size.     IVÁN ERWIN MD       Electronically Signed and Approved By: IVÁN ERWIN MD on 5/03/2023 at 12:14             MRI Lumbar Spine With & Without Contrast    Result Date: 5/6/2023    1. There is severe facet osteoarthritis on the right at L5-S1 with probably associated reactive enhancement and signal abnormality.  Septic arthritis involving the right facet joint cannot be excluded entirely.  Please correlate with pertinent lab values, such as C-reactive protein (CRP), erythrocyte sedimentation rate (ESR), and white blood cell (WBC) count.  There is no associated focal sizable (drainable) fluid collection in this region.  There is a suspected right L5-S1 facet joint effusion.  2. There is moderate-to-severe right foraminal narrowing at L5-S1.  3. There may have been some progression of the degenerative changes involving the L2-3, L4-5, and L5-S1 levels.  4. Please see above comments for further detail.      Please note that portions of this note were completed with a voice recognition program.  STEVO OJEDA JR, MD       Electronically Signed and Approved By: STEVO OJEDA JR, MD on 5/06/2023 at 6:31                 The above data has been reviewed by SOFIA Grullon 08/29/2023 10:58 EDT.          Patient Care Team:  Mady Mcnally APRN as PCP - General (Family Medicine)  Arminda Arteaga APRN  as Nurse Practitioner (Pain Medicine)  Adam Helms MD as Consulting Physician (Cardiology)  Franco Landa PA-C as Physician Assistant (Physician Assistant)            ASSESSMENT & PLAN    Diagnoses and all orders for this visit:    1. Annual physical exam (Primary)  -     Comprehensive Metabolic Panel; Future  -     Lipid Panel; Future  -     Hemoglobin A1c; Future  -     Microalbumin / Creatinine Urine Ratio - Urine, Clean Catch; Future  -     Urinalysis With Culture If Indicated - Urine, Clean Catch  -     CBC Auto Differential; Future    2. COPD on long-term inhaled steroid therapy  Comments:  Continue inhalers, will check chest x-ray, encourage smoking cessation.  Orders:  -     Fluticasone Furoate-Vilanterol (BREO ELLIPTA) 200-25 MCG/ACT inhaler; Inhale 1 puff Daily.  Dispense: 60 each; Refill: 1  -     tiotropium (Spiriva HandiHaler) 18 MCG per inhalation capsule; Place 1 capsule into inhaler and inhale Daily.  Dispense: 90 capsule; Refill: 1    3. Allergic rhinitis, unspecified seasonality, unspecified trigger  Comments:  Symptoms well controlled with current medication regimen, cont. Current meds.  Orders:  -     loratadine (CLARITIN) 10 MG tablet; Take 1 tablet by mouth Daily.  Dispense: 90 tablet; Refill: 1    4. Essential hypertension  Comments:  not at goal, increase losartan to 50mg daily, close BP monitoring advised.  Orders:  -     losartan (Cozaar) 50 MG tablet; Take 1 tablet by mouth Daily.  Dispense: 90 tablet; Refill: 1    5. Gastroesophageal reflux disease with esophagitis without hemorrhage  Comments:  Stable on protonix 40mg daily  Orders:  -     pantoprazole (PROTONIX) 40 MG EC tablet; Take 1 tablet by mouth Daily.  Dispense: 90 tablet; Refill: 1    6. Anxiety  Comments:  Well-controlled with Zoloft, continue current medication  Orders:  -     sertraline (ZOLOFT) 100 MG tablet; Take 1.5 tablets by mouth Every Night.  Dispense: 135 tablet; Refill: 1    7. Current mild episode of  major depressive disorder without prior episode  Comments:  Well-controlled with Zoloft, continue current medication  Orders:  -     sertraline (ZOLOFT) 100 MG tablet; Take 1.5 tablets by mouth Every Night.  Dispense: 135 tablet; Refill: 1    8. Urinary frequency  -     POCT urinalysis dipstick, automated    9. Screening mammogram, encounter for  -     Mammo Screening Digital Tomosynthesis Bilateral With CAD; Future    10. Menopause  -     DEXA Bone Density Axial; Future    11. Tobacco abuse  -      CT Chest Low Dose Cancer Screening WO; Future    Other orders  -     aspirin 81 MG chewable tablet; Chew 1 tablet Daily.  Dispense: 90 tablet; Refill: 1     Lindsey Mckinnon  reports that she has been smoking cigarettes. She has a 7.50 pack-year smoking history. She has never used smokeless tobacco.. I have educated her on the risk of diseases from using tobacco products such as cancer, COPD, and heart disease.     I advised her to quit and she is not willing to quit.    I spent 10 minutes counseling the patient.           Tobacco Use: High Risk    Smoking Tobacco Use: Every Day    Smokeless Tobacco Use: Never    Passive Exposure: Not on file       Follow Up     Return in about 6 months (around 2/29/2024).        Patient was given instructions and counseling regarding her condition or for health maintenance advice. Please see specific information pulled into the AVS if appropriate.   I have reviewed information obtained and documented by others and I have confirmed the accuracy of this documented note.    Mady Mcnally, SOFIA        Answers submitted by the patient for this visit:  Primary Reason for Visit (Submitted on 8/22/2023)  What is the primary reason for your visit?: High Blood Pressure

## 2023-08-30 ENCOUNTER — HOSPITAL ENCOUNTER (OUTPATIENT)
Dept: MRI IMAGING | Facility: HOSPITAL | Age: 58
Discharge: HOME | End: 2023-08-30
Payer: COMMERCIAL

## 2023-08-30 DIAGNOSIS — M43.02 CERVICAL SPONDYLOLYSIS: ICD-10-CM

## 2023-08-30 LAB
BACTERIA SPEC AEROBE CULT: NO GROWTH
HBA1C MFR BLD: 5.8 % (ref 4.8–5.6)

## 2023-08-30 PROCEDURE — 72141 MRI NECK SPINE W/O DYE: CPT

## 2023-08-31 DIAGNOSIS — R31.9 URINARY TRACT INFECTION WITH HEMATURIA, SITE UNSPECIFIED: Primary | ICD-10-CM

## 2023-08-31 DIAGNOSIS — N39.0 URINARY TRACT INFECTION WITH HEMATURIA, SITE UNSPECIFIED: Primary | ICD-10-CM

## 2023-08-31 RX ORDER — NITROFURANTOIN 25; 75 MG/1; MG/1
100 CAPSULE ORAL 2 TIMES DAILY
Qty: 20 CAPSULE | Refills: 0 | Status: SHIPPED | OUTPATIENT
Start: 2023-08-31

## 2023-09-26 ENCOUNTER — OFFICE VISIT (OUTPATIENT)
Dept: CARDIOLOGY | Facility: CLINIC | Age: 58
End: 2023-09-26
Payer: COMMERCIAL

## 2023-09-26 VITALS
DIASTOLIC BLOOD PRESSURE: 103 MMHG | BODY MASS INDEX: 34.32 KG/M2 | HEART RATE: 87 BPM | SYSTOLIC BLOOD PRESSURE: 131 MMHG | WEIGHT: 206 LBS | HEIGHT: 65 IN

## 2023-09-26 DIAGNOSIS — E78.2 MIXED HYPERLIPIDEMIA: ICD-10-CM

## 2023-09-26 DIAGNOSIS — I10 ESSENTIAL HYPERTENSION: ICD-10-CM

## 2023-09-26 DIAGNOSIS — I25.10 CORONARY ARTERY DISEASE INVOLVING NATIVE CORONARY ARTERY OF NATIVE HEART WITHOUT ANGINA PECTORIS: Primary | ICD-10-CM

## 2023-09-26 DIAGNOSIS — I25.5 ISCHEMIC CARDIOMYOPATHY: ICD-10-CM

## 2023-09-26 PROBLEM — I49.01 VENTRICULAR FIBRILLATION: Status: RESOLVED | Noted: 2022-08-25 | Resolved: 2023-09-26

## 2023-09-26 PROCEDURE — 3080F DIAST BP >= 90 MM HG: CPT | Performed by: INTERNAL MEDICINE

## 2023-09-26 PROCEDURE — 1159F MED LIST DOCD IN RCRD: CPT | Performed by: INTERNAL MEDICINE

## 2023-09-26 PROCEDURE — 3075F SYST BP GE 130 - 139MM HG: CPT | Performed by: INTERNAL MEDICINE

## 2023-09-26 PROCEDURE — 1160F RVW MEDS BY RX/DR IN RCRD: CPT | Performed by: INTERNAL MEDICINE

## 2023-09-26 PROCEDURE — 99214 OFFICE O/P EST MOD 30 MIN: CPT | Performed by: INTERNAL MEDICINE

## 2023-09-26 RX ORDER — LOSARTAN POTASSIUM 50 MG/1
75 TABLET ORAL DAILY
Qty: 135 TABLET | Refills: 1 | Status: SHIPPED | OUTPATIENT
Start: 2023-09-26

## 2023-09-26 RX ORDER — CLOPIDOGREL BISULFATE 75 MG/1
75 TABLET ORAL DAILY
Qty: 90 TABLET | Refills: 3 | Status: SHIPPED | OUTPATIENT
Start: 2023-09-26

## 2023-09-26 NOTE — ASSESSMENT & PLAN NOTE
She is currently stable with no anginal-like symptoms.  We will repeat echocardiogram now to reevaluate LV function.  She reported shortness of breath with Brilinta and she completed 1 year of dual antiplatelet therapy.  We will discontinue Brilinta and initiate Plavix 75 mg once daily.  Continue aspirin  and beta-blockers.  She is intolerant to statins.  Also counseled regarding tobacco cessation, she is not ready to quit at this time.

## 2023-09-26 NOTE — ASSESSMENT & PLAN NOTE
Diastolic blood pressure on the higher side.  She is on 50 mg of losartan for the past 1 month.  Will increase dose to 75 mg daily.  Continue bisoprolol at the current dose.  Encouraged to keep home blood pressure log.

## 2023-09-26 NOTE — ASSESSMENT & PLAN NOTE
Most recent LDL is 176, which is significantly elevated.  Today, patient reported that she never took statin medications since discharge from the hospital last year after index STEMI.  She has intolerance to multiple statins which were tried in the past.  The options were discussed with the patient in detail.  We will start Repatha 140 mg subcutaneous twice weekly.  Prescription sent to [facility] today.  We will repeat lipid panel in 3 months

## 2023-09-26 NOTE — LETTER
September 26, 2023     SOFIA Grullon  [address]  [address]  [place] [address]    Patient: Lindsey Mckinnon   YOB: 1965   Date of Visit: 9/26/2023       Dear SOFIA Grullno    Lindsey Mckinnon was in my office today. Below is a copy of my note.    If you have questions, please do not hesitate to call me. I look forward to following Lindsey along with you.         Sincerely,        Adam Helms MD        CC: OPAL Orta APRN      CARDIOLOGY FOLLOW-UP PROGRESS NOTE        Chief Complaint  Follow-up and Coronary Artery Disease    Subjective            Lindsey Mckinnon presents to [facility] CARDIOLOG[facility]  History of Present Illness    Ms Mckinnon is here for routine 6-month follow-up visit.  She denies any recent episodes of chest pain.  She continues to report fatigue and shortness of breath.  She stopped taking Brilinta at the 1 year anniversary and there was some improvement in shortness of breath since then.  Today, Ms. Mckinnon informed me that, he never took statin medication since discharge from the hospital 1 year back.  She was tried on 4 different statins in the past and could not take them due to significant muscle pain.  Her most recent labs showed an LDL above 170.  She is fairly active at baseline.  Denies any palpitations or dizziness.  She continues to smoke cigarettes and not ready to quit at this time.  Her blood pressure generally running high at home.      Past History:    Coronary artery disease : Index presentation acute inferior STEMI on 8/25/2022, status post primary angioplasty and stent placement to mid right coronary artery.  Procedure was complicated by a recurrent ventricular tachycardia, intubation.     Essential hypertension  Mixed hyperlipidemia  Chronic obstructive pulm disease  Chronic pain syndrome    Medical History:  Past Medical History:   Diagnosis Date   • Acute ST elevation myocardial infarction  (STEMI) involving right coronary artery 2022   • Alcoholism    • Anemia    • Anxiety    • Arthritis    • Asthma    • Bipolar disorder    • Breast lump    • Cervical spine pain    • Chronic allergic rhinitis    • Chronic pain disorder    • COPD (chronic obstructive pulmonary disease) 04/15/2021   • Coronary artery disease    • Deep vein thrombosis    • Degenerative disc disease, cervical    • Depression    • Esophageal reflux    • Essential hypertension 04/15/2021   • Fibromyalgia    • Forgetfulness    • Gall stones    • GERD (gastroesophageal reflux disease)    • H/O emphysema    • Head injury    • Heart attack 2022   • Hemorrhoid    • Hernia cerebri    • Hernia, hiatal    • Herniated disc, cervical    • Hyperlipidemia 04/15/2021   • Kidney stone    • Limb swelling    • Lumbar pain    • Lumbosacral disc disease    • Migraine    • Mood disorder    • Muscle cramps    • Nicotine dependence 04/15/2021   • Peripheral neuropathy    • Reflux esophagitis    • Shortness of breath    • Spinal stenosis    • STEMI (ST elevation myocardial infarction) 2022   • Thoracic disc disorder    • Tremor 04/15/2021       Surgical History: has a past surgical history that includes  section; Hysterectomy; Cervical Facetectomy;  section (); Esophagogastroduodenoscopy; Gallbladder surgery (); Hysterectomy (); Lumbar epidural injection; Cholecystectomy; Cardiac catheterization (N/A, 2022); Epidural block injection; Trigger point injection; Vascular surgery; and Carotid stent.     Family History: family history includes Arthritis in her brother, father, mother, and son; Bipolar disorder in her mother; Cancer in her father and mother; Depression in her brother and mother; Diabetes in her brother, father, and mother; Heart attack in her father; Heart disease in her father; Kidney cancer in her brother and father; Lung cancer in her father and mother; OCD in her brother and brother;  "Osteoporosis in her mother; Paranoid behavior in her brother.     Social History: reports that she has been smoking cigarettes. She has a 7.50 pack-year smoking history. She has never used smokeless tobacco. She reports that she does not currently use alcohol. She reports that she does not use drugs.    Allergies: Morphine and Sulfa antibiotics    Current Outpatient Medications on File Prior to Visit   Medication Sig   • aspirin 81 MG chewable tablet Chew 1 tablet Daily.   • bisoprolol (ZEBeta) 5 MG tablet Take 1 tablet by mouth Daily for 90 days.   • Fluticasone Furoate-Vilanterol (BREO ELLIPTA) 200-25 MCG/ACT inhaler Inhale 1 puff Daily.   • loratadine (CLARITIN) 10 MG tablet Take 1 tablet by mouth Daily.   • metaxalone (SKELAXIN) 800 MG tablet Take 1 tablet by mouth 3 (Three) Times a Day As Needed.   • nitrofurantoin, macrocrystal-monohydrate, (Macrobid) 100 MG capsule Take 1 capsule by mouth 2 (Two) Times a Day.   • oxyCODONE ER (oxyCONTIN) 15 MG tablet extended-release 12 hour Take 1 tablet by mouth Every 12 (Twelve) Hours.   • pantoprazole (PROTONIX) 40 MG EC tablet Take 1 tablet by mouth Daily.   • sertraline (ZOLOFT) 100 MG tablet Take 1.5 tablets by mouth Every Night.   • tiotropium (Spiriva HandiHaler) 18 MCG per inhalation capsule Place 1 capsule into inhaler and inhale Daily.   •  losartan (Cozaar) 50 MG tablet Take 1 tablet by mouth Daily.         Review of Systems   Constitutional:  Positive for fatigue.   Respiratory:  Positive for shortness of breath. Negative for cough and wheezing.    Cardiovascular:  Negative for chest pain, palpitations and leg swelling.   Gastrointestinal:  Negative for nausea and vomiting.   Neurological:  Negative for dizziness and syncope.      Objective     BP (!) 131/103   Pulse 87   Ht 165.1 cm (65\")   Wt 93.4 kg (206 lb)   BMI 34.28 kg/m²       Physical Exam    General : Alert, awake, no acute distress  Neck : Supple, no carotid bruit, no jugular venous " distention  CVS : Regular rate and rhythm, no murmur, rubs or gallops  Lungs: Clear to auscultation bilaterally, no crackles or rhonchi  Abdomen: Soft, nontender, bowel sounds heard in all 4 quadrants  Extremities: Warm, well-perfused, no pedal edema    Result Review :     The following data was reviewed by: Adam Helms MD on 09/26/2023:    CMP          4/26/2023    12:16 8/25/2023    09:11 8/29/2023    11:57   CMP   Glucose 72  84  83    BUN 9  13  16    Creatinine 0.76  0.72  0.86    EGFR 91.5  97.1  78.4    Sodium 140  142  140    Potassium 3.7  4.5  4.2    Chloride 105  105  104    Calcium 9.8  9.9  9.4    Total Protein 7.4  7.0  7.1    Albumin 4.3  4.2  4.1    Globulin 3.1  2.8  3.0    Total Bilirubin 0.2  0.3  <0.2    Alkaline Phosphatase 93  77  74    AST (SGOT) 15  13  15    ALT (SGPT) 13  12  15    Albumin/Globulin Ratio 1.4  1.5  1.4    BUN/Creatinine Ratio 11.8  18.1  18.6    Anion Gap 11.0  10.2  11.7      CBC          4/26/2023    12:16 8/29/2023    11:57   CBC   WBC 11.82  10.12    RBC 5.06  4.45    Hemoglobin 15.4  14.0    Hematocrit 46.6  42.1    MCV 92.1  94.6    MCH 30.4  31.5    MCHC 33.0  33.3    RDW 13.1  13.1    Platelets 314  327      TSH          4/26/2023    12:16 8/29/2023    11:57   TSH   TSH 2.790  3.240      Lipid Panel          4/26/2023    12:16 8/29/2023    11:57   Lipid Panel   Total Cholesterol 251  245    Triglycerides 117  108    HDL Cholesterol 37  50    VLDL Cholesterol 21  19    LDL Cholesterol  193  176    LDL/HDL Ratio 5.15  3.47           Data reviewed: Cardiology studies        Results for orders placed during the hospital encounter of 08/25/22    Adult Transthoracic Echo Complete w/ Color, Spectral and Contrast if necessary per protocol    Interpretation Summary  · The aortic root measures 3.2 cm.  · Left ventricular ejection fraction appears to be 51 - 55%.  · The following left ventricular wall segments are hypokinetic: mid inferior, mid inferoseptal and basal  inferior.  · Left ventricular diastolic dysfunction is noted.  · No evidence of significant valvular disease                   Assessment and Plan        Diagnoses and all orders for this visit:    1. Coronary artery disease involving native coronary artery of native heart without angina pectoris (Primary)  Assessment & Plan:  She is currently stable with no anginal-like symptoms.  We will repeat echocardiogram now to reevaluate LV function.  She reported shortness of breath with Brilinta and she completed 1 year of dual antiplatelet therapy.  We will discontinue Brilinta and initiate Plavix 75 mg once daily.  Continue aspirin  and beta-blockers.  She is intolerant to statins.  Also counseled regarding tobacco cessation, she is not ready to quit at this time.    Orders:  -     clopidogrel (PLAVIX) 75 MG tablet; Take 1 tablet by mouth Daily.  Dispense: 90 tablet; Refill: 3  -     Adult Transthoracic Echo Complete W/ Cont if Necessary Per Protocol; Future    2. Mixed hyperlipidemia  Assessment & Plan:  Most recent LDL is 176, which is significantly elevated.  Today, patient reported that she never took statin medications since discharge from the hospital last year after index STEMI.  She has intolerance to multiple statins which were tried in the past.  The options were discussed with the patient in detail.  We will start Repatha 140 mg subcutaneous twice weekly.  Prescription sent to [facility] today.  We will repeat lipid panel in 3 months    Orders:  -     Evolocumab (REPATHA) solution auto-injector SureClick injection; Inject 1 mL under the skin into the appropriate area as directed Every 14 (Fourteen) Days.  Dispense: 6 mL; Refill: 3  -     Lipid Panel; Future  -     Comprehensive Metabolic Panel; Future    3. Essential hypertension  Assessment & Plan:  Diastolic blood pressure on the higher side.  She is on 50 mg of losartan for the past 1 month.  Will increase dose to 75 mg daily.  Continue bisoprolol at the  current dose.  Encouraged to keep home blood pressure log.    Orders:  -     losartan (Cozaar) 50 MG tablet; Take 1.5 tablets by mouth Daily.  Dispense: 135 tablet; Refill: 1    4. Ischemic cardiomyopathy  Assessment & Plan:  LVEF 50% per echocardiogram done immediately after myocardial infarction.  She is clinically not volume overloaded.  We will do repeat echo now to reevaluate LV function and wall motion.  Continue losartan and bisoprolol.                Follow Up     Return in about 4 months (around 1/26/2024) for Next scheduled follow up, ok TO USE A NEW PATIENT SLOT IF NEEDED .    Patient was given instructions and counseling regarding her condition or for health maintenance advice. Please see specific information pulled into the AVS if appropriate.

## 2023-09-26 NOTE — ASSESSMENT & PLAN NOTE
LVEF 50% per echocardiogram done immediately after myocardial infarction.  She is clinically not volume overloaded.  We will do repeat echo now to reevaluate LV function and wall motion.  Continue losartan and bisoprolol.

## 2023-09-26 NOTE — PROGRESS NOTES
CARDIOLOGY FOLLOW-UP PROGRESS NOTE        Chief Complaint  Follow-up and Coronary Artery Disease    Subjective            Lindsey Mckinnon presents to [facility] CARDIOLOGY  History of Present Illness    Ms Mckinnon is here for routine 6-month follow-up visit.  She denies any recent episodes of chest pain.  She continues to report fatigue and shortness of breath.  She stopped taking Brilinta at the 1 year anniversary and there was some improvement in shortness of breath since then.  Today, Ms. Mckinnon informed me that, he never took statin medication since discharge from the hospital 1 year back.  She was tried on 4 different statins in the past and could not take them due to significant muscle pain.  Her most recent labs showed an LDL above 170.  She is fairly active at baseline.  Denies any palpitations or dizziness.  She continues to smoke cigarettes and not ready to quit at this time.  Her blood pressure generally running high at home.      Past History:    Coronary artery disease : Index presentation acute inferior STEMI on 8/25/2022, status post primary angioplasty and stent placement to mid right coronary artery.  Procedure was complicated by a recurrent ventricular tachycardia, intubation.     Essential hypertension  Mixed hyperlipidemia  Chronic obstructive pulm disease  Chronic pain syndrome    Medical History:  Past Medical History:   Diagnosis Date    Acute ST elevation myocardial infarction (STEMI) involving right coronary artery 08/25/2022    Alcoholism     Anemia     Anxiety     Arthritis     Asthma     Bipolar disorder     Breast lump     Cervical spine pain     Chronic allergic rhinitis     Chronic pain disorder     COPD (chronic obstructive pulmonary disease) 04/15/2021    Coronary artery disease     Deep vein thrombosis     Degenerative disc disease, cervical     Depression     Esophageal reflux     Essential hypertension 04/15/2021    Fibromyalgia     Forgetfulness      Gall stones     GERD (gastroesophageal reflux disease)     H/O emphysema     Head injury     Heart attack 2022    Hemorrhoid     Hernia cerebri     Hernia, hiatal     Herniated disc, cervical     Hyperlipidemia 04/15/2021    Kidney stone     Limb swelling     Lumbar pain     Lumbosacral disc disease     Migraine     Mood disorder     Muscle cramps     Nicotine dependence 04/15/2021    Peripheral neuropathy     Reflux esophagitis     Shortness of breath     Spinal stenosis     STEMI (ST elevation myocardial infarction) 2022    Thoracic disc disorder     Tremor 04/15/2021       Surgical History: has a past surgical history that includes  section; Hysterectomy; Cervical Facetectomy;  section (,); Esophagogastroduodenoscopy; Gallbladder surgery (); Hysterectomy (); Lumbar epidural injection; Cholecystectomy; Cardiac catheterization (N/A, 2022); Epidural block injection; Trigger point injection; Vascular surgery; and Carotid stent.     Family History: family history includes Arthritis in her brother, father, mother, and son; Bipolar disorder in her mother; Cancer in her father and mother; Depression in her brother and mother; Diabetes in her brother, father, and mother; Heart attack in her father; Heart disease in her father; Kidney cancer in her brother and father; Lung cancer in her father and mother; OCD in her brother and brother; Osteoporosis in her mother; Paranoid behavior in her brother.     Social History: reports that she has been smoking cigarettes. She has a 7.50 pack-year smoking history. She has never used smokeless tobacco. She reports that she does not currently use alcohol. She reports that she does not use drugs.    Allergies: Morphine and Sulfa antibiotics    Current Outpatient Medications on File Prior to Visit   Medication Sig    aspirin 81 MG chewable tablet Chew 1 tablet Daily.    bisoprolol (ZEBeta) 5 MG tablet Take 1 tablet by mouth Daily for 90  "days.    Fluticasone Furoate-Vilanterol (BREO ELLIPTA) 200-25 MCG/ACT inhaler Inhale 1 puff Daily.    loratadine (CLARITIN) 10 MG tablet Take 1 tablet by mouth Daily.    metaxalone (SKELAXIN) 800 MG tablet Take 1 tablet by mouth 3 (Three) Times a Day As Needed.    nitrofurantoin, macrocrystal-monohydrate, (Macrobid) 100 MG capsule Take 1 capsule by mouth 2 (Two) Times a Day.    oxyCODONE ER (oxyCONTIN) 15 MG tablet extended-release 12 hour Take 1 tablet by mouth Every 12 (Twelve) Hours.    pantoprazole (PROTONIX) 40 MG EC tablet Take 1 tablet by mouth Daily.    sertraline (ZOLOFT) 100 MG tablet Take 1.5 tablets by mouth Every Night.    tiotropium (Spiriva HandiHaler) 18 MCG per inhalation capsule Place 1 capsule into inhaler and inhale Daily.     losartan (Cozaar) 50 MG tablet Take 1 tablet by mouth Daily.         Review of Systems   Constitutional:  Positive for fatigue.   Respiratory:  Positive for shortness of breath. Negative for cough and wheezing.    Cardiovascular:  Negative for chest pain, palpitations and leg swelling.   Gastrointestinal:  Negative for nausea and vomiting.   Neurological:  Negative for dizziness and syncope.      Objective     BP (!) 131/103   Pulse 87   Ht 165.1 cm (65\")   Wt 93.4 kg (206 lb)   BMI 34.28 kg/m²       Physical Exam    General : Alert, awake, no acute distress  Neck : Supple, no carotid bruit, no jugular venous distention  CVS : Regular rate and rhythm, no murmur, rubs or gallops  Lungs: Clear to auscultation bilaterally, no crackles or rhonchi  Abdomen: Soft, nontender, bowel sounds heard in all 4 quadrants  Extremities: Warm, well-perfused, no pedal edema    Result Review :     The following data was reviewed by: Adam Helms MD on 09/26/2023:    CMP          4/26/2023    12:16 8/25/2023    09:11 8/29/2023    11:57   CMP   Glucose 72  84  83    BUN 9  13  16    Creatinine 0.76  0.72  0.86    EGFR 91.5  97.1  78.4    Sodium 140  142  140    Potassium 3.7  4.5  4.2  "   Chloride 105  105  104    Calcium 9.8  9.9  9.4    Total Protein 7.4  7.0  7.1    Albumin 4.3  4.2  4.1    Globulin 3.1  2.8  3.0    Total Bilirubin 0.2  0.3  <0.2    Alkaline Phosphatase 93  77  74    AST (SGOT) 15  13  15    ALT (SGPT) 13  12  15    Albumin/Globulin Ratio 1.4  1.5  1.4    BUN/Creatinine Ratio 11.8  18.1  18.6    Anion Gap 11.0  10.2  11.7      CBC          4/26/2023    12:16 8/29/2023    11:57   CBC   WBC 11.82  10.12    RBC 5.06  4.45    Hemoglobin 15.4  14.0    Hematocrit 46.6  42.1    MCV 92.1  94.6    MCH 30.4  31.5    MCHC 33.0  33.3    RDW 13.1  13.1    Platelets 314  327      TSH          4/26/2023    12:16 8/29/2023    11:57   TSH   TSH 2.790  3.240      Lipid Panel          4/26/2023    12:16 8/29/2023    11:57   Lipid Panel   Total Cholesterol 251  245    Triglycerides 117  108    HDL Cholesterol 37  50    VLDL Cholesterol 21  19    LDL Cholesterol  193  176    LDL/HDL Ratio 5.15  3.47           Data reviewed: Cardiology studies        Results for orders placed during the hospital encounter of 08/25/22    Adult Transthoracic Echo Complete w/ Color, Spectral and Contrast if necessary per protocol    Interpretation Summary  · The aortic root measures 3.2 cm.  · Left ventricular ejection fraction appears to be 51 - 55%.  · The following left ventricular wall segments are hypokinetic: mid inferior, mid inferoseptal and basal inferior.  · Left ventricular diastolic dysfunction is noted.  · No evidence of significant valvular disease                   Assessment and Plan        Diagnoses and all orders for this visit:    1. Coronary artery disease involving native coronary artery of native heart without angina pectoris (Primary)  Assessment & Plan:  She is currently stable with no anginal-like symptoms.  We will repeat echocardiogram now to reevaluate LV function.  She reported shortness of breath with Brilinta and she completed 1 year of dual antiplatelet therapy.  We will discontinue  Brilinta and initiate Plavix 75 mg once daily.  Continue aspirin  and beta-blockers.  She is intolerant to statins.  Also counseled regarding tobacco cessation, she is not ready to quit at this time.    Orders:  -     clopidogrel (PLAVIX) 75 MG tablet; Take 1 tablet by mouth Daily.  Dispense: 90 tablet; Refill: 3  -     Adult Transthoracic Echo Complete W/ Cont if Necessary Per Protocol; Future    2. Mixed hyperlipidemia  Assessment & Plan:  Most recent LDL is 176, which is significantly elevated.  Today, patient reported that she never took statin medications since discharge from the hospital last year after index STEMI.  She has intolerance to multiple statins which were tried in the past.  The options were discussed with the patient in detail.  We will start Repatha 140 mg subcutaneous twice weekly.  Prescription sent to [facility] today.  We will repeat lipid panel in 3 months    Orders:  -     Evolocumab (REPATHA) solution auto-injector SureClick injection; Inject 1 mL under the skin into the appropriate area as directed Every 14 (Fourteen) Days.  Dispense: 6 mL; Refill: 3  -     Lipid Panel; Future  -     Comprehensive Metabolic Panel; Future    3. Essential hypertension  Assessment & Plan:  Diastolic blood pressure on the higher side.  She is on 50 mg of losartan for the past 1 month.  Will increase dose to 75 mg daily.  Continue bisoprolol at the current dose.  Encouraged to keep home blood pressure log.    Orders:  -     losartan (Cozaar) 50 MG tablet; Take 1.5 tablets by mouth Daily.  Dispense: 135 tablet; Refill: 1    4. Ischemic cardiomyopathy  Assessment & Plan:  LVEF 50% per echocardiogram done immediately after myocardial infarction.  She is clinically not volume overloaded.  We will do repeat echo now to reevaluate LV function and wall motion.  Continue losartan and bisoprolol.                Follow Up     Return in about 4 months (around 1/26/2024) for Next scheduled follow up, ok TO USE A NEW PATIENT  SLOT IF NEEDED .    Patient was given instructions and counseling regarding her condition or for health maintenance advice. Please see specific information pulled into the AVS if appropriate.

## 2023-09-28 ENCOUNTER — TELEPHONE (OUTPATIENT)
Dept: CARDIOLOGY | Facility: CLINIC | Age: 58
End: 2023-09-28
Payer: COMMERCIAL

## 2023-09-28 NOTE — TELEPHONE ENCOUNTER
[facility] received a fax that requires your attention. The document has been indexed to the patient’s chart for your review.      Reason for sending: EXTERNAL MEDICAL RECORD NOTIFICATION     Documents Description: MEDS-REPATHA [id number]    Name of Sender: STEPHEN     Date Indexed: 9.27.23

## 2023-09-28 NOTE — TELEPHONE ENCOUNTER
[employer]NA [employer] APPROVED    The request has been approved. The authorization is effective from 09/28/2023 to 03/27/2024, as long as the member is enrolled in their current health plan. The request was approved as submitted. A written notification letter will follow with additional details.

## 2023-09-29 RX ORDER — ASPIRIN 81 MG/1
TABLET, CHEWABLE ORAL
Qty: 99 TABLET | Refills: 0 | Status: SHIPPED | OUTPATIENT
Start: 2023-09-29

## 2023-10-04 ENCOUNTER — TELEPHONE (OUTPATIENT)
Dept: FAMILY MEDICINE CLINIC | Facility: CLINIC | Age: 58
End: 2023-10-04
Payer: COMMERCIAL

## 2023-10-04 NOTE — TELEPHONE ENCOUNTER
Hi, this is Demi  from [facility]. Please be reminded that your provider ordered a Mammogram and DEXA Scan  for you to get done.   You may get this done at the [facility] or at [facility] ([facility] in [facility].    Please call [phone], [facility] [phone] to get this scheduled if you haven't already.    Thank you.

## 2023-10-04 NOTE — TELEPHONE ENCOUNTER
Hi, this is Demi  from [facility]. Please be reminded that your provider ordered a CT Chest Low dose  for you to get done.   You may get this done at the [facility] or at [facility] ([facility] in [facility].    Please call [phone], [facility] [phone] to get this scheduled if you haven't already.    Thank you.

## 2023-10-10 ENCOUNTER — LAB (OUTPATIENT)
Dept: LAB | Facility: HOSPITAL | Age: 58
End: 2023-10-10
Payer: COMMERCIAL

## 2023-10-10 DIAGNOSIS — N39.0 URINARY TRACT INFECTION WITH HEMATURIA, SITE UNSPECIFIED: ICD-10-CM

## 2023-10-10 DIAGNOSIS — R31.9 URINARY TRACT INFECTION WITH HEMATURIA, SITE UNSPECIFIED: ICD-10-CM

## 2023-10-10 LAB — HOLD SPECIMEN: NORMAL

## 2023-10-10 PROCEDURE — 81001 URINALYSIS AUTO W/SCOPE: CPT

## 2023-10-11 LAB
BACTERIA UR QL AUTO: ABNORMAL /HPF
BILIRUB UR QL STRIP: NEGATIVE
CLARITY UR: CLEAR
COD CRY URNS QL: ABNORMAL /HPF
COLOR UR: ABNORMAL
GLUCOSE UR STRIP-MCNC: NEGATIVE MG/DL
HGB UR QL STRIP.AUTO: NEGATIVE
HYALINE CASTS UR QL AUTO: ABNORMAL /LPF
KETONES UR QL STRIP: NEGATIVE
LEUKOCYTE ESTERASE UR QL STRIP.AUTO: ABNORMAL
MUCOUS THREADS URNS QL MICRO: ABNORMAL /HPF
NITRITE UR QL STRIP: NEGATIVE
PH UR STRIP.AUTO: 6 [PH] (ref 5–8)
PROT UR QL STRIP: NEGATIVE
RBC # UR STRIP: ABNORMAL /HPF
REF LAB TEST METHOD: ABNORMAL
SP GR UR STRIP: 1.03 (ref 1–1.03)
SQUAMOUS #/AREA URNS HPF: ABNORMAL /HPF
UROBILINOGEN UR QL STRIP: ABNORMAL
WBC # UR STRIP: ABNORMAL /HPF

## 2023-10-12 ENCOUNTER — TELEPHONE (OUTPATIENT)
Dept: FAMILY MEDICINE CLINIC | Facility: CLINIC | Age: 58
End: 2023-10-12
Payer: COMMERCIAL

## 2023-10-12 NOTE — TELEPHONE ENCOUNTER
Patient called regarding her urine test results and is concerned. PT would like to be called to discuss this.     [facility] [phone]

## 2023-10-19 ENCOUNTER — OFFICE VISIT (OUTPATIENT)
Dept: NEUROSURGERY | Facility: CLINIC | Age: 58
End: 2023-10-19
Payer: COMMERCIAL

## 2023-10-19 VITALS
HEIGHT: 65 IN | BODY MASS INDEX: 34.52 KG/M2 | DIASTOLIC BLOOD PRESSURE: 77 MMHG | SYSTOLIC BLOOD PRESSURE: 113 MMHG | WEIGHT: 207.2 LBS | HEART RATE: 101 BPM

## 2023-10-19 DIAGNOSIS — M50.30 DDD (DEGENERATIVE DISC DISEASE), CERVICAL: Primary | ICD-10-CM

## 2023-10-19 DIAGNOSIS — R20.2 POSITIVE TINEL'S SIGN: ICD-10-CM

## 2023-10-19 DIAGNOSIS — M54.2 CERVICALGIA: ICD-10-CM

## 2023-10-19 DIAGNOSIS — M47.812 FACET ARTHRITIS OF CERVICAL REGION: ICD-10-CM

## 2023-10-19 NOTE — PROGRESS NOTES
Patient being seen for today for Neck Pain (Here for neck pain.  MRI completed 8/30)  .    Subjective    Lindsey Mckinnon is a 58 y.o. female that presents with Neck Pain (Here for neck pain.  MRI completed 8/30)  .    HPI  Previously:  Last seen on 5/10/2023 for continued pain in the back and down the legs to the knees.  She had severe facet arthritis especially on the right at L5-S1.  We discussed that we cannot rule out infectious arthropathy on the right at L5-S1.  We discussed possible CRP and ESR lab testing to evaluate further, which she deferred.  Scrips treatment options including physical therapy, spinal injections and spinal surgery.  She has tried and failed conservative treatment previously.  She was nonambulatory she wanted to consider a surgical approach.  There was plan to follow-up as needed for failure to improve or worsening symptoms.    Today: She presents today for evaluation of the cervical spine.    She reports pain in the neck and both shoulders and the left shoulder blade. The neck pain has been present for many years, but the pain worsened and started to move to the left shoulder blade in August prior to her MRI. She very rarely has pain into the arms to about the elbows. She rates her pain overall 4/10 today. The neck pain is constant, dull and throbbing.    She reports nothing improves the pain. Tilting or turning the head worsens the pain. It bothers her more at no specific time of day.     She does report numbness and tingling to all the fingertips intermittently. She reports subjective weakness in the hands.    She has done some epidural injections in the neck previously which was ineffective. Pain medication is somewhat effective. Muscle relaxer is somewhat effective.    She has never had surgery in the spine.     reports that she has been smoking cigarettes. She has a 7.50 pack-year smoking history. She has never used smokeless tobacco.    Review of Systems   Musculoskeletal:   Positive for neck pain.       Objective   Vitals:    10/19/23 1303   BP: 113/77   Pulse: 101        Physical Exam  Constitutional:       Appearance: Normal appearance.   Neck:      Comments: Pain with ROM  Pulmonary:      Effort: Pulmonary effort is normal.   Musculoskeletal:         General: Tenderness (midline cervical and bilateral cervical muscles) present.      Comments: Aguero's negative bilaterally,  Tinel's test positive at the left wrist   Neurological:      General: No focal deficit present.      Mental Status: She is alert and oriented to person, place, and time.      Sensory: No sensory deficit.      Motor: No weakness.      Deep Tendon Reflexes: Reflexes normal.   Psychiatric:         Mood and Affect: Mood normal.         Behavior: Behavior normal.          Result Review   I have personally reviewed the MRI of cervical spine without contrast from 8/30/2023 which shows moderate multilevel spondylosis and facet arthritis without high-grade central canal or foraminal stenosis.  There is moderate left foraminal stenosis at C3-C4 and C4-C5.  There is mild to moderate bilateral foraminal stenosis at C5-C6.  There is moderate left foraminal narrowing at C6-C7 and mild to moderate left foraminal narrowing at C7-T1.     Assessment and Plan {CC Problem List  Visit Diagnosis  ROS  Review (Popup)  [facility]  Quality  BestPractice  Medications  SmartSets  SnapShot Encounters  Media :23}   Diagnoses and all orders for this visit:    1. DDD (degenerative disc disease), cervical (Primary)  -     Ambulatory Referral to Physical Therapy Evaluate and treat; Stretching (core, cervical), ROM, Strengthening    2. Facet arthritis of cervical region  -     Ambulatory Referral to Physical Therapy Evaluate and treat; Stretching (core, cervical), ROM, Strengthening    3. Cervicalgia  -     Ambulatory Referral to Physical Therapy Evaluate and treat; Stretching (core, cervical), ROM, Strengthening    4.  Positive Tinel's sign    Most of her pain is in the neck and the left shoulder blade.    I do not expect surgery to help this pain.    She does not have any high-grade stenosis in the cervical spine. There is multilevel mild to moderate narrowing.    I expect she could benefit from CESB vs MBBs/RFA in the past. She reports having done these treatments previously without benefit, however.    She may benefit from PT. Again, this was not helpful in the past, however.    I will refer her to physical therapy targeting the cervical spine to assess benefit now.    We discussed the importance of smoking/nicotine cessation. Smoking/nicotine use has multiple health risks. In particular related to the spine, nicotine increases the incidence of lower back pain, speeds up the progression of degenerative disc disease and dramatically reduces healing after spine surgery (particularly a fusion operation).     The patient was counseled on basic recommendations for the reduction and prevention of back, neck, or spine pain in association with spinal disorders, including: cessation/avoidance of nicotine use, maintenance of a healthy BMI and weight, focusing on building/maintaining core strength through core exercise, and avoidance of activities which worsen the pain. The patient will monitor for changes in symptoms and notify our clinic of these changes as needed.    She will follow-up here PRN.  Follow Up {Instructions Charge Capture  Follow-up Communications :23}   Return if symptoms worsen or fail to improve.

## 2023-12-05 ENCOUNTER — TELEPHONE (OUTPATIENT)
Dept: FAMILY MEDICINE CLINIC | Facility: CLINIC | Age: 58
End: 2023-12-05
Payer: COMMERCIAL

## 2024-01-02 DIAGNOSIS — I10 ESSENTIAL HYPERTENSION: ICD-10-CM

## 2024-01-02 RX ORDER — LOSARTAN POTASSIUM 50 MG/1
50 TABLET ORAL DAILY
Qty: 90 TABLET | Refills: 0 | Status: SHIPPED | OUTPATIENT
Start: 2024-01-02

## 2024-01-29 ENCOUNTER — TRANSCRIBE ORDERS (OUTPATIENT)
Dept: ADMINISTRATIVE | Facility: HOSPITAL | Age: 59
End: 2024-01-29
Payer: COMMERCIAL

## 2024-01-29 DIAGNOSIS — R10.84 GENERALIZED ABDOMINAL PAIN: Primary | ICD-10-CM

## 2024-01-31 ENCOUNTER — TRANSCRIBE ORDERS (OUTPATIENT)
Dept: ADMINISTRATIVE | Facility: HOSPITAL | Age: 59
End: 2024-01-31
Payer: COMMERCIAL

## 2024-01-31 DIAGNOSIS — Z12.2 SCREENING FOR LUNG CANCER: Primary | ICD-10-CM

## 2024-02-05 ENCOUNTER — HOSPITAL ENCOUNTER (OUTPATIENT)
Dept: CT IMAGING | Facility: HOSPITAL | Age: 59
Discharge: HOME | End: 2024-02-05
Admitting: STUDENT IN AN ORGANIZED HEALTH CARE EDUCATION/TRAINING PROGRAM
Payer: COMMERCIAL

## 2024-02-05 DIAGNOSIS — Z12.2 SCREENING FOR LUNG CANCER: ICD-10-CM

## 2024-02-05 PROCEDURE — 71271 CT THORAX LUNG CANCER SCR C-: CPT

## 2024-02-12 ENCOUNTER — OFFICE VISIT (OUTPATIENT)
Dept: CARDIOLOGY | Facility: CLINIC | Age: 59
End: 2024-02-12
Payer: COMMERCIAL

## 2024-02-12 VITALS
BODY MASS INDEX: 36.19 KG/M2 | HEIGHT: 65 IN | WEIGHT: 217.2 LBS | DIASTOLIC BLOOD PRESSURE: 91 MMHG | HEART RATE: 102 BPM | SYSTOLIC BLOOD PRESSURE: 140 MMHG

## 2024-02-12 DIAGNOSIS — E78.2 MIXED HYPERLIPIDEMIA: ICD-10-CM

## 2024-02-12 DIAGNOSIS — I25.5 ISCHEMIC CARDIOMYOPATHY: ICD-10-CM

## 2024-02-12 DIAGNOSIS — I25.10 CORONARY ARTERY DISEASE INVOLVING NATIVE CORONARY ARTERY OF NATIVE HEART WITHOUT ANGINA PECTORIS: Primary | ICD-10-CM

## 2024-02-12 DIAGNOSIS — I10 ESSENTIAL HYPERTENSION: ICD-10-CM

## 2024-02-12 PROCEDURE — 3077F SYST BP >= 140 MM HG: CPT | Performed by: FAMILY MEDICINE

## 2024-02-12 PROCEDURE — 3080F DIAST BP >= 90 MM HG: CPT | Performed by: FAMILY MEDICINE

## 2024-02-12 PROCEDURE — 99214 OFFICE O/P EST MOD 30 MIN: CPT | Performed by: FAMILY MEDICINE

## 2024-02-12 RX ORDER — RABEPRAZOLE SODIUM 20 MG/1
1 TABLET, DELAYED RELEASE ORAL DAILY
COMMUNITY
Start: 2024-01-24

## 2024-02-12 RX ORDER — CLOPIDOGREL BISULFATE 75 MG/1
75 TABLET ORAL DAILY
Qty: 90 TABLET | Refills: 3 | Status: SHIPPED | OUTPATIENT
Start: 2024-02-12

## 2024-02-12 NOTE — PROGRESS NOTES
Chief Complaint  Coronary Artery Disease and Follow-up    Subjective        History of Present Illness  Lindsey Mckinnon presents to [facility] CARDIOLOGY   Ms. Mckinnon is a 58-year-old female patient coming in for routine follow-up of coronary artery disease.  Cardiac wise, she denies any new concerns.  She has no complaints of chest pains, any significant shortness of breath, palpitations, lightheadedness or dizziness.  She has ongoing chronic back pain, and is being seen pain management, and neurosurgery.        Past History:     Coronary artery disease : Index presentation acute inferior STEMI on 8/25/2022, status post primary angioplasty and stent placement to mid right coronary artery.  Procedure was complicated by a recurrent ventricular tachycardia, intubation.     Essential hypertension  Mixed hyperlipidemia  Chronic obstructive pulm disease  Chronic pain syndrome      Past Medical History:   Diagnosis Date    Acute ST elevation myocardial infarction (STEMI) involving right coronary artery 08/25/2022    Alcoholism     Anemia     Anxiety     Arthritis     Asthma     Bipolar disorder     Breast lump     Cervical spine pain     Chronic allergic rhinitis     Chronic pain disorder     COPD (chronic obstructive pulmonary disease) 04/15/2021    Coronary artery disease     Deep vein thrombosis     Degenerative disc disease, cervical     Depression     Esophageal reflux     Essential hypertension 04/15/2021    Fibromyalgia     Forgetfulness     Gall stones     GERD (gastroesophageal reflux disease)     H/O emphysema     Head injury     Heart attack 08/2022    Hemorrhoid     Hernia cerebri     Hernia, hiatal     Herniated disc, cervical     Hyperlipidemia 04/15/2021    Kidney stone     Limb swelling     Lumbar pain     Lumbosacral disc disease     Migraine     Mood disorder     Muscle cramps     Nicotine dependence 04/15/2021    Peripheral neuropathy     Reflux esophagitis     Shortness of  breath     Spinal stenosis     STEMI (ST elevation myocardial infarction) 2022    Thoracic disc disorder     Tremor 04/15/2021       Allergies   Allergen Reactions    Morphine Itching    Sulfa Antibiotics Nausea And Vomiting        Past Surgical History:   Procedure Laterality Date    CARDIAC CATHETERIZATION N/A 2022    Procedure: Left Heart Cath;  Surgeon: Adam Helms MD;  Location: [facility] CATH INVASIVE LOCATION;  Service: Cardiovascular;  Laterality: N/A;    CAROTID STENT      CERVICAL EPIDURAL      STERIOD INJECTIONS      SECTION      X 2     SECTION  ,    CHOLECYSTECTOMY      ENDOSCOPY      EPIDURAL BLOCK      GALLBLADDER SURGERY      HYSTERECTOMY      HYSTERECTOMY      LUMBAR EPIDURAL INJECTION      STERIOD    TRIGGER POINT INJECTION      VASCULAR SURGERY          Social History  She  reports that she has been smoking cigarettes. She has a 7.50 pack-year smoking history. She has never used smokeless tobacco. She reports that she does not currently use alcohol. She reports that she does not use drugs.    Family History  Her family history includes Arthritis in her brother, father, mother, and son; Bipolar disorder in her mother; Cancer in her father and mother; Depression in her brother and mother; Diabetes in her brother, father, and mother; Heart attack in her father; Heart disease in her father; Kidney cancer in her brother and father; Lung cancer in her father and mother; OCD in her brother and brother; Osteoporosis in her mother; Paranoid behavior in her brother.       Current Outpatient Medications on File Prior to Visit   Medication Sig    loratadine (CLARITIN) 10 MG tablet Take 1 tablet by mouth Daily.    losartan (COZAAR) 50 MG tablet TAKE 1 TABLET BY MOUTH DAILY    metaxalone (SKELAXIN) 800 MG tablet Take 1 tablet by mouth 3 (Three) Times a Day As Needed.    oxyCODONE ER (oxyCONTIN) 15 MG tablet extended-release 12 hour Take 1 tablet by mouth Every 12  "(Twelve) Hours.    RABEprazole (ACIPHEX) 20 MG EC tablet Take 1 tablet by mouth Daily.    sertraline (ZOLOFT) 100 MG tablet Take 1.5 tablets by mouth Every Night.    tiotropium (Spiriva HandiHaler) 18 MCG per inhalation capsule Place 1 capsule into inhaler and inhale Daily.    aspirin 81 MG chewable tablet CHEW AND SWALLOW 1 TABLET BY MOUTH DAILY (Patient not taking: Reported on 2/12/2024)    bisoprolol (ZEBeta) 5 MG tablet Take 1 tablet by mouth Daily for 90 days.    Fluticasone Furoate-Vilanterol (BREO ELLIPTA) 200-25 MCG/ACT inhaler Inhale 1 puff Daily. (Patient not taking: Reported on 2/12/2024)     No current facility-administered medications on file prior to visit.         Review of Systems   Constitutional:  Negative for fatigue.   Respiratory:  Negative for cough, chest tightness and shortness of breath.    Cardiovascular:  Negative for chest pain, palpitations and leg swelling.   Gastrointestinal:  Negative for nausea and vomiting.   Musculoskeletal:  Positive for arthralgias and back pain.   Neurological:  Negative for dizziness and syncope.        Objective   Vitals:    02/12/24 1314   BP: 140/91   Pulse: 102   Weight: 98.5 kg (217 lb 3.2 oz)   Height: 165.1 cm (65\")         Physical Exam  General : Alert, awake, no acute distress  Neck : Supple, no carotid bruit, no jugular venous distention  CVS : Regular rate and rhythm, no murmur, rubs or gallops  Lungs: Clear to auscultation bilaterally, no crackles or rhonchi  Abdomen: Soft, nontender, bowel sounds active  Extremities: Warm, well-perfused, no pedal edema      Result Review     The following data was reviewed by SOFIA Burnham  proBNP   Date Value Ref Range Status   08/25/2022 45.6 0.0 - 900.0 pg/mL Final     CMP          8/25/2023    09:11 8/29/2023    11:57 2/20/2024    06:43   CMP   Glucose 84  83  119    BUN 13  16  8    Creatinine 0.72  0.86  0.68    EGFR 97.1  78.4  101.1    Sodium 142  140  140    Potassium 4.5  4.2  4.0    Chloride 105  " "104  105    Calcium 9.9  9.4  9.5    Total Protein 7.0  7.1  6.9    Albumin 4.2  4.1  3.8    Globulin 2.8  3.0  3.1    Total Bilirubin 0.3  <0.2  0.3    Alkaline Phosphatase 77  74  81    AST (SGOT) 13  15  14    ALT (SGPT) 12  15  22    Albumin/Globulin Ratio 1.5  1.4  1.2    BUN/Creatinine Ratio 18.1  18.6  11.8    Anion Gap 10.2  11.7  9.9      CBC w/diff          4/26/2023    12:16 8/29/2023    11:57   CBC w/Diff   WBC 11.82  10.12    RBC 5.06  4.45    Hemoglobin 15.4  14.0    Hematocrit 46.6  42.1    MCV 92.1  94.6    MCH 30.4  31.5    MCHC 33.0  33.3    RDW 13.1  13.1    Platelets 314  327    Neutrophil Rel % 63.6  62.3    Immature Granulocyte Rel % 0.5  0.4    Lymphocyte Rel % 26.8  27.1    Monocyte Rel % 6.4  7.3    Eosinophil Rel % 1.9  2.2    Basophil Rel % 0.8  0.7       Lab Results   Component Value Date    TSH 3.240 08/29/2023      Lab Results   Component Value Date    FREET4 1.04 05/05/2022      No results found for: \"DDIMERQUANT\"  Magnesium   Date Value Ref Range Status   08/27/2022 2.0 1.6 - 2.6 mg/dL Final      No results found for: \"DIGOXIN\"   Lab Results   Component Value Date    TROPONINT 2.050 (C) 08/27/2022           Lipid Panel          4/26/2023    12:16 8/29/2023    11:57   Lipid Panel   Total Cholesterol 251  245    Triglycerides 117  108    HDL Cholesterol 37  50    VLDL Cholesterol 21  19    LDL Cholesterol  193  176    LDL/HDL Ratio 5.15  3.47        Results for orders placed in visit on 10/19/23    Adult Transthoracic Echo Complete W/ Cont if Necessary Per Protocol    Interpretation Summary    Left ventricular systolic function is normal. Left ventricular ejection fraction appears to be 56 - 60%.  There are no obvious regional wall motion abnormalities.    Left ventricular wall thickness is consistent with mild concentric hypertrophy.    Left ventricular diastolic function is consistent with (grade I) impaired relaxation.    There are no significant valvular abnormalities.    Estimated " right ventricular systolic pressure from tricuspid regurgitation is normal (<35 mmHg).         Assessment and Plan   Diagnoses and all orders for this visit:    1. Coronary artery disease involving native coronary artery of native heart without angina pectoris (Primary)  Assessment & Plan:  She is stable without any general symptoms.  She is experiencing shortness of breath with Brilinta, after 1 year of this, she was transition to Plavix, and is tolerating this well.  Continue DAPT therapy with aspirin, Plavix, and beta-blocker.    Orders:  -     clopidogrel (PLAVIX) 75 MG tablet; Take 1 tablet by mouth Daily.  Dispense: 90 tablet; Refill: 3    2. Ischemic cardiomyopathy  Assessment & Plan:  She had mildly reduced LVEF of 50% following her MI, echocardiogram last fall shows normalization with LVEF of 56 to 60%.  She does not have any volume overload.  Will continue current doses of losartan and bisoprolol.      3. Essential hypertension  -     Comprehensive Metabolic Panel; Future    4. Mixed hyperlipidemia  Assessment & Plan:  Most recent LDL elevated at 176.  She has been intolerant to statin therapies, was not on any medication at that time, she has since started on Repatha.  Need to update fasting lipid profile.    Orders:  -     Comprehensive Metabolic Panel; Future  -     Lipid Panel; Future  -     Evolocumab (REPATHA) solution auto-injector SureClick injection; Inject 1 mL under the skin into the appropriate area as directed Every 14 (Fourteen) Days.  Dispense: 6 mL; Refill: 3        Follow Up   Return in about 6 months (around 8/12/2024) for with Dr. Helms.    Patient was given instructions and counseling regarding her condition or for health maintenance advice. Please see specific information pulled into the AVS if appropriate.     Signed,  Ashley Reynolds, APRN  02/12/2024     Dictated Utilizing Dragon Dictation: Please note that portions of this note were completed with a voice recognition program.   Part of this note may be an electronic transcription/translation of spoken language to printed text using the Dragon Dictation System.

## 2024-02-20 ENCOUNTER — APPOINTMENT (OUTPATIENT)
Dept: CT IMAGING | Facility: HOSPITAL | Age: 59
End: 2024-02-20
Payer: COMMERCIAL

## 2024-02-20 ENCOUNTER — HOSPITAL ENCOUNTER (EMERGENCY)
Facility: HOSPITAL | Age: 59
Discharge: HOME | End: 2024-02-20
Attending: EMERGENCY MEDICINE | Admitting: EMERGENCY MEDICINE
Payer: COMMERCIAL

## 2024-02-20 VITALS
TEMPERATURE: 97.5 F | SYSTOLIC BLOOD PRESSURE: 141 MMHG | WEIGHT: 220.46 LBS | RESPIRATION RATE: 22 BRPM | OXYGEN SATURATION: 94 % | BODY MASS INDEX: 36.73 KG/M2 | HEIGHT: 65 IN | DIASTOLIC BLOOD PRESSURE: 99 MMHG | HEART RATE: 93 BPM

## 2024-02-20 DIAGNOSIS — J03.90 TONSILLITIS: Primary | ICD-10-CM

## 2024-02-20 LAB
ALBUMIN SERPL-MCNC: 3.8 G/DL (ref 3.5–5.2)
ALBUMIN/GLOB SERPL: 1.2 G/DL
ALP SERPL-CCNC: 81 U/L (ref 39–117)
ALT SERPL W P-5'-P-CCNC: 22 U/L (ref 1–33)
ANION GAP SERPL CALCULATED.3IONS-SCNC: 9.9 MMOL/L (ref 5–15)
AST SERPL-CCNC: 14 U/L (ref 1–32)
BASOPHILS # BLD AUTO: 0.08 10*3/MM3 (ref 0–0.2)
BASOPHILS NFR BLD AUTO: 0.6 % (ref 0–1.5)
BILIRUB SERPL-MCNC: 0.3 MG/DL (ref 0–1.2)
BUN SERPL-MCNC: 8 MG/DL (ref 6–20)
BUN/CREAT SERPL: 11.8 (ref 7–25)
CALCIUM SPEC-SCNC: 9.5 MG/DL (ref 8.6–10.5)
CHLORIDE SERPL-SCNC: 105 MMOL/L (ref 98–107)
CO2 SERPL-SCNC: 25.1 MMOL/L (ref 22–29)
CREAT SERPL-MCNC: 0.68 MG/DL (ref 0.57–1)
DEPRECATED RDW RBC AUTO: 50.1 FL (ref 37–54)
EGFRCR SERPLBLD CKD-EPI 2021: 101.1 ML/MIN/1.73
EOSINOPHIL # BLD AUTO: 0.33 10*3/MM3 (ref 0–0.4)
EOSINOPHIL NFR BLD AUTO: 2.5 % (ref 0.3–6.2)
ERYTHROCYTE [DISTWIDTH] IN BLOOD BY AUTOMATED COUNT: 14.6 % (ref 12.3–15.4)
GLOBULIN UR ELPH-MCNC: 3.1 GM/DL
GLUCOSE SERPL-MCNC: 119 MG/DL (ref 65–99)
HCT VFR BLD AUTO: 44.6 % (ref 34–46.6)
HETEROPH AB SER QL LA: NEGATIVE
HGB BLD-MCNC: 14.5 G/DL (ref 12–15.9)
HOLD SPECIMEN: NORMAL
HOLD SPECIMEN: NORMAL
IMM GRANULOCYTES # BLD AUTO: 0.06 10*3/MM3 (ref 0–0.05)
IMM GRANULOCYTES NFR BLD AUTO: 0.4 % (ref 0–0.5)
LYMPHOCYTES # BLD AUTO: 2.58 10*3/MM3 (ref 0.7–3.1)
LYMPHOCYTES NFR BLD AUTO: 19.3 % (ref 19.6–45.3)
MCH RBC QN AUTO: 30.8 PG (ref 26.6–33)
MCHC RBC AUTO-ENTMCNC: 32.5 G/DL (ref 31.5–35.7)
MCV RBC AUTO: 94.7 FL (ref 79–97)
MONOCYTES # BLD AUTO: 0.99 10*3/MM3 (ref 0.1–0.9)
MONOCYTES NFR BLD AUTO: 7.4 % (ref 5–12)
NEUTROPHILS NFR BLD AUTO: 69.8 % (ref 42.7–76)
NEUTROPHILS NFR BLD AUTO: 9.33 10*3/MM3 (ref 1.7–7)
NRBC BLD AUTO-RTO: 0 /100 WBC (ref 0–0.2)
PLATELET # BLD AUTO: 293 10*3/MM3 (ref 140–450)
PMV BLD AUTO: 9.8 FL (ref 6–12)
POTASSIUM SERPL-SCNC: 4 MMOL/L (ref 3.5–5.2)
PROT SERPL-MCNC: 6.9 G/DL (ref 6–8.5)
RBC # BLD AUTO: 4.71 10*6/MM3 (ref 3.77–5.28)
SODIUM SERPL-SCNC: 140 MMOL/L (ref 136–145)
WBC NRBC COR # BLD AUTO: 13.37 10*3/MM3 (ref 3.4–10.8)
WHOLE BLOOD HOLD COAG: NORMAL
WHOLE BLOOD HOLD SPECIMEN: NORMAL

## 2024-02-20 PROCEDURE — 25010000002 DEXAMETHASONE SODIUM PHOSPHATE 10 MG/ML SOLUTION: Performed by: EMERGENCY MEDICINE

## 2024-02-20 PROCEDURE — 25510000001 IOPAMIDOL PER 1 ML: Performed by: EMERGENCY MEDICINE

## 2024-02-20 PROCEDURE — 96375 TX/PRO/DX INJ NEW DRUG ADDON: CPT

## 2024-02-20 PROCEDURE — 25010000002 AMPICILLIN-SULBACTAM PER 1.5 G: Performed by: EMERGENCY MEDICINE

## 2024-02-20 PROCEDURE — 99285 EMERGENCY DEPT VISIT HI MDM: CPT

## 2024-02-20 PROCEDURE — 70491 CT SOFT TISSUE NECK W/DYE: CPT

## 2024-02-20 PROCEDURE — 96365 THER/PROPH/DIAG IV INF INIT: CPT

## 2024-02-20 PROCEDURE — 25010000002 KETOROLAC TROMETHAMINE PER 15 MG: Performed by: EMERGENCY MEDICINE

## 2024-02-20 PROCEDURE — 80053 COMPREHEN METABOLIC PANEL: CPT | Performed by: NURSE PRACTITIONER

## 2024-02-20 PROCEDURE — 85025 COMPLETE CBC W/AUTO DIFF WBC: CPT | Performed by: NURSE PRACTITIONER

## 2024-02-20 PROCEDURE — 86308 HETEROPHILE ANTIBODY SCREEN: CPT | Performed by: NURSE PRACTITIONER

## 2024-02-20 RX ORDER — DEXAMETHASONE SODIUM PHOSPHATE 10 MG/ML
10 INJECTION, SOLUTION INTRAMUSCULAR; INTRAVENOUS ONCE
Status: COMPLETED | OUTPATIENT
Start: 2024-02-20 | End: 2024-02-20

## 2024-02-20 RX ORDER — KETOROLAC TROMETHAMINE 15 MG/ML
15 INJECTION, SOLUTION INTRAMUSCULAR; INTRAVENOUS ONCE
Status: COMPLETED | OUTPATIENT
Start: 2024-02-20 | End: 2024-02-20

## 2024-02-20 RX ADMIN — AMPICILLIN AND SULBACTAM 3 G: 2; 1 INJECTION, POWDER, FOR SOLUTION INTRAVENOUS at 09:06

## 2024-02-20 RX ADMIN — KETOROLAC TROMETHAMINE 15 MG: 15 INJECTION, SOLUTION INTRAMUSCULAR; INTRAVENOUS at 09:05

## 2024-02-20 RX ADMIN — IOPAMIDOL 100 ML: 755 INJECTION, SOLUTION INTRAVENOUS at 07:42

## 2024-02-20 RX ADMIN — DEXAMETHASONE SODIUM PHOSPHATE 10 MG: 10 INJECTION INTRAMUSCULAR; INTRAVENOUS at 07:19

## 2024-02-20 NOTE — ED PROVIDER NOTES
Time: 7:09 AM EST  Date of encounter:  2/20/2024  Independent Historian/Clinical History and Information was obtained by:   Patient and Family()    History is limited by: N/A    Chief Complaint: Facial swelling       History of Present Illness:  Patient is a 58 y.o. year old female who presents to the emergency department for evaluation of left sided facial swelling. Pt states she had a left ear ache and sore throat that started yesterday. She went to urgent care, they did a flu, COVID and strep test which were all negative. She was started on Augmentin. Pt states it has gotten much worse since then. She is unable to swallow liquids and eat due to pain and her throat feels like it is closing up. She also has swelling in the left neck. Pt denies fever, chills, nausea, vomiting, shortness of breath.     HPI    Patient Care Team  Primary Care Provider: Mady Mcnally APRN    Past Medical History:     Allergies   Allergen Reactions    Morphine Itching    Sulfa Antibiotics Nausea And Vomiting     Past Medical History:   Diagnosis Date    Acute ST elevation myocardial infarction (STEMI) involving right coronary artery 08/25/2022    Alcoholism     Anemia     Anxiety     Arthritis     Asthma     Bipolar disorder     Breast lump     Cervical spine pain     Chronic allergic rhinitis     Chronic pain disorder     COPD (chronic obstructive pulmonary disease) 04/15/2021    Coronary artery disease     Deep vein thrombosis     Degenerative disc disease, cervical     Depression     Esophageal reflux     Essential hypertension 04/15/2021    Fibromyalgia     Forgetfulness     Gall stones     GERD (gastroesophageal reflux disease)     H/O emphysema     Head injury     Heart attack 08/2022    Hemorrhoid     Hernia cerebri     Hernia, hiatal     Herniated disc, cervical     Hyperlipidemia 04/15/2021    Kidney stone     Limb swelling     Lumbar pain     Lumbosacral disc disease     Migraine     Mood disorder     Muscle cramps      Nicotine dependence 04/15/2021    Peripheral neuropathy     Reflux esophagitis     Shortness of breath     Spinal stenosis     STEMI (ST elevation myocardial infarction) 2022    Thoracic disc disorder     Tremor 04/15/2021     Past Surgical History:   Procedure Laterality Date    CARDIAC CATHETERIZATION N/A 2022    Procedure: Left Heart Cath;  Surgeon: Adam Helms MD;  Location: [facility] INVASIVE LOCATION;  Service: Cardiovascular;  Laterality: N/A;    CAROTID STENT      CERVICAL EPIDURAL      STERIOD INJECTIONS      SECTION      X 2     SECTION  ,    CHOLECYSTECTOMY      ENDOSCOPY      EPIDURAL BLOCK      GALLBLADDER SURGERY      HYSTERECTOMY      HYSTERECTOMY      LUMBAR EPIDURAL INJECTION      STERIOD    TRIGGER POINT INJECTION      VASCULAR SURGERY       Family History   Problem Relation Age of Onset    Depression Mother     Bipolar disorder Mother     Lung cancer Mother         MALIGNANT    Diabetes Mother         UNSPECIFIED TYPE    Cancer Mother         BLADDER    Osteoporosis Mother     Arthritis Mother     Lung cancer Father         MALIGNANT    Diabetes Father         UNSPECIFIED TYPE/ MELLITUS    Heart attack Father     Kidney cancer Father     Cancer Father         BLADDER    Arthritis Father     Heart disease Father     OCD Brother     Depression Brother     Diabetes Brother         UNSPECIFIED TYPE/MELLITUS    Kidney cancer Brother     Arthritis Brother     Paranoid behavior Brother     OCD Brother     Arthritis Son        Home Medications:  Prior to Admission medications    Medication Sig Start Date End Date Taking? Authorizing Provider   amoxicillin-clavulanate (AUGMENTIN) 875-125 MG per tablet Take 1 tablet by mouth 2 (Two) Times a Day for 7 days. 24  Jesse Ellis,    aspirin 81 MG chewable tablet CHEW AND SWALLOW 1 TABLET BY MOUTH DAILY  Patient not taking: Reported on 2024   Melanie Gonzalez APRN    bisoprolol (ZEBeta) 5 MG tablet Take 1 tablet by mouth Daily for 90 days. 12/12/22 9/26/23  Ashley Reynolds APRN   clopidogrel (PLAVIX) 75 MG tablet Take 1 tablet by mouth Daily. 2/12/24   Ashley Reynolds APRN   Evolocumab (REPATHA) solution auto-injector SureClick injection Inject 1 mL under the skin into the appropriate area as directed Every 14 (Fourteen) Days. 2/12/24   Ashley Reynolds APRN   Fluticasone Furoate-Vilanterol (BREO ELLIPTA) 200-25 MCG/ACT inhaler Inhale 1 puff Daily.  Patient not taking: Reported on 2/12/2024 8/29/23   Mady Mcnally APRN   loratadine (CLARITIN) 10 MG tablet Take 1 tablet by mouth Daily. 8/29/23   Mady Mcnally APRN   losartan (COZAAR) 50 MG tablet TAKE 1 TABLET BY MOUTH DAILY 1/2/24   Mady Mcnally APRN   metaxalone (SKELAXIN) 800 MG tablet Take 1 tablet by mouth 3 (Three) Times a Day As Needed. 5/19/22   ProviderSal MD   oxyCODONE ER (oxyCONTIN) 15 MG tablet extended-release 12 hour Take 1 tablet by mouth Every 12 (Twelve) Hours.    ProviderSal MD   RABEprazole (ACIPHEX) 20 MG EC tablet Take 1 tablet by mouth Daily. 1/24/24   Sal Begum MD   sertraline (ZOLOFT) 100 MG tablet Take 1.5 tablets by mouth Every Night. 8/29/23   Mady Mcnally APRN   tiotropium (Spiriva HandiHaler) 18 MCG per inhalation capsule Place 1 capsule into inhaler and inhale Daily. 8/29/23   Mady Mcnally APRN        Social History:   Social History     Tobacco Use    Smoking status: Every Day     Packs/day: 0.50     Years: 15.00     Additional pack years: 0.00     Total pack years: 7.50     Types: Cigarettes    Smokeless tobacco: Never    Tobacco comments:     STARTED AT AGE 15   Vaping Use    Vaping Use: Never used   Substance Use Topics    Alcohol use: Not Currently    Drug use: Never     Types: Marijuana         Review of Systems:  Review of Systems   Constitutional:  Negative for fever.   HENT:  Positive for ear pain, facial swelling and sore throat.   "       Physical Exam:  /99   Pulse 93   Temp 97.5 °F (36.4 °C) (Oral)   Resp 22   Ht 165.1 cm (65\")   Wt 100 kg (220 lb 7.4 oz)   SpO2 94%   BMI 36.69 kg/m²     Physical Exam  Vitals and nursing note reviewed.   Constitutional:       General: She is in acute distress.      Appearance: Normal appearance.   HENT:      Head: Normocephalic and atraumatic.      Mouth/Throat:      Mouth: Mucous membranes are moist.      Pharynx: Uvula midline. Uvula swelling (significant) present. No oropharyngeal exudate.      Comments: There is tonsillar edema as well as uvular edema and swelling noted.  Eyes:      General: No scleral icterus.  Cardiovascular:      Rate and Rhythm: Normal rate and regular rhythm.      Heart sounds: Normal heart sounds.   Pulmonary:      Effort: Pulmonary effort is normal.      Breath sounds: Normal breath sounds.   Abdominal:      Palpations: Abdomen is soft.      Tenderness: There is no abdominal tenderness.   Musculoskeletal:         General: Normal range of motion.      Cervical back: Normal range of motion.   Lymphadenopathy:      Cervical: Cervical adenopathy present.   Skin:     Findings: No rash.   Neurological:      General: No focal deficit present.      Mental Status: She is alert.                  Procedures:  Procedures      Medical Decision Making:      Comorbidities that affect care:    Coronary Artery Disease, Hypertension    External Notes reviewed:    Reviewed urgent care note from yesterday      The following orders were placed and all results were independently analyzed by me:  Orders Placed This Encounter   Procedures    CT Soft Tissue Neck With Contrast    Comprehensive Metabolic Panel    [place] Draw    Mononucleosis Screen    CBC Auto Differential    Inpatient ENT Consult    CBC & Differential    Green Top (Gel)    Lavender Top    Gold Top - SST    Light Blue Top       Medications Given in the Emergency Department:  Medications   dexAMETHasone sodium phosphate " injection 10 mg (10 mg Intravenous Given 2/20/24 0719)   iopamidol (ISOVUE-370) 76 % injection 100 mL (100 mL Intravenous Given 2/20/24 0742)   ampicillin-sulbactam 3 g/100 mL 0.9% NS IVPB (3 g Intravenous New Bag 2/20/24 0906)   ketorolac (TORADOL) injection 15 mg (15 mg Intravenous Given 2/20/24 0905)        ED Course:    ED Course as of 02/20/24 0936 Tue Feb 20, 2024 0848 Spoke to Dr. Haddad who recommends steroids and continued abx.  States that she is able to tolerate liquids and is breathing normally she can be discharged home.  Will have continued pain and swelling for 7 to 10 days.  If worsens needs return. [MA]      ED Course User Index  [MA] Chad Michael MD       Labs:    Lab Results (last 24 hours)       Procedure Component Value Units Date/Time    POC Rapid Strep A [[id number]]  (Normal) Resulted: 02/19/24 1422    Specimen: Swab Updated: 02/19/24 1422     Rapid Strep A Screen Negative     Internal Control Passed     Lot Number [id number]     Expiration Date 2,272,025    POCT SARS-CoV-2 Antigen [[id number]] Collected: 02/19/24 1428    Specimen: Swab from Nasopharynx Updated: 02/19/24 1429     SARS Antigen Not Detected     Internal Control Passed     Lot Number [id number]     Expiration Date 7,032,024    POC Influenza A / B [[id number]] Collected: 02/19/24 1428    Specimen: Swab Updated: 02/19/24 1428     Rapid Influenza A Ag Negative     Rapid Influenza B Ag Negative     Internal Control Passed     Lot Number [id number]     Expiration Date 12,052,025    CBC & Differential [[id number]]  (Abnormal) Collected: 02/20/24 0643    Specimen: Blood Updated: 02/20/24 0650    Narrative:      The following orders were created for panel order CBC & Differential.  Procedure                               Abnormality         Status                     ---------                               -----------         ------                     CBC Auto Differential[[id number]]        Abnormal            Final result                  Please view results for these tests on the individual orders.    Comprehensive Metabolic Panel [[id number]]  (Abnormal) Collected: 02/20/24 0643    Specimen: Blood Updated: 02/20/24 0707     Glucose 119 mg/dL      BUN 8 mg/dL      Creatinine 0.68 mg/dL      Sodium 140 mmol/L      Potassium 4.0 mmol/L      Chloride 105 mmol/L      CO2 25.1 mmol/L      Calcium 9.5 mg/dL      Total Protein 6.9 g/dL      Albumin 3.8 g/dL      ALT (SGPT) 22 U/L      AST (SGOT) 14 U/L      Alkaline Phosphatase 81 U/L      Total Bilirubin 0.3 mg/dL      Globulin 3.1 gm/dL      A/G Ratio 1.2 g/dL      BUN/Creatinine Ratio 11.8     Anion Gap 9.9 mmol/L      eGFR 101.1 mL/min/1.73     Narrative:      GFR Normal >60  Chronic Kidney Disease <60  Kidney Failure <15      Mononucleosis Screen [[id number]]  (Normal) Collected: 02/20/24 0643    Specimen: Blood Updated: 02/20/24 0710     Monospot Negative    CBC Auto Differential [[id number]]  (Abnormal) Collected: 02/20/24 0643    Specimen: Blood Updated: 02/20/24 0650     WBC 13.37 10*3/mm3      RBC 4.71 10*6/mm3      Hemoglobin 14.5 g/dL      Hematocrit 44.6 %      MCV 94.7 fL      MCH 30.8 pg      MCHC 32.5 g/dL      RDW 14.6 %      RDW-SD 50.1 fl      MPV 9.8 fL      Platelets 293 10*3/mm3      Neutrophil % 69.8 %      Lymphocyte % 19.3 %      Monocyte % 7.4 %      Eosinophil % 2.5 %      Basophil % 0.6 %      Immature Grans % 0.4 %      Neutrophils, Absolute 9.33 10*3/mm3      Lymphocytes, Absolute 2.58 10*3/mm3      Monocytes, Absolute 0.99 10*3/mm3      Eosinophils, Absolute 0.33 10*3/mm3      Basophils, Absolute 0.08 10*3/mm3      Immature Grans, Absolute 0.06 10*3/mm3      nRBC 0.0 /100 WBC              Imaging:    CT Soft Tissue Neck With Contrast    Result Date: 2/20/2024  PROCEDURE: CT SOFT TISSUE NECK W CONTRAST  COMPARISON: None  INDICATIONS: neck swelling/pain/dysphagia since last night  PROTOCOL:   Standard imaging protocol performed    RADIATION:   DLP: 498.5mGy*cm   Automated  exposure control was utilized to minimize radiation dose. CONTRAST: 100cc Isovue 370 I.V.  TECHNIQUE: After obtaining the patient's consent, CT images were obtained with non-ionic intravenous contrast material.   FINDINGS:  There is left greater than right palatine tonsillar enlargement.  There is no walled off tonsillar or peritonsillar fluid collection.  The pharyngeal and lingual tonsillar tissues appear within normal limits.  The uvula appears swollen.  There is no retropharyngeal soft tissue swelling or fluid collection.  Epiglottis appears within normal limits.  No definite laryngeal abnormality demonstrated.  No abnormality of the floor of the mouth demonstrated.  Parotid and submandibular glands are within normal limits.  There are borderline level 2 internal jugular lymph nodes that are likely reactive.  There is no fluid in the paranasal sinuses.  There is mucosal edema of the nasal cavity.  No abnormality of the visualized intracranial structures is demonstrated.  There is emphysema in the lung apices       Findings compatible with tonsillitis and uvulitis.  No evidence of tonsillar/peritonsillar abscess     CINDY GLEASON MD       Electronically Signed and Approved By: CINDY GLEASON MD on 2/20/2024 at 8:23                Differential Diagnosis and Discussion:    Sore Throat: Differential diagnosis includes but is not limited to bacterial infection, viral infection, inhaled irritants, sinus drainage, thyroiditis, epiglottitis, and retropharyngeal abscess.    All labs were reviewed and interpreted by me.  CT scan radiology impression was interpreted by me.    MDM     Amount and/or Complexity of Data Reviewed  Clinical lab tests: reviewed  Tests in the radiology section of CPT®: reviewed       Patient is a 58-year-old female who presents with complaints of facial swelling and sore throat.  Was diagnosed with sore throat yesterday and placed on antibiotics.  Has had 2 doses of antibiotics.  Has significant  swelling of the oropharynx noted on exam today with erythema.  Has tonsillitis noted on CT scan.  Was given steroids and antibiotics here.  Is tolerating p.o.  I did speak with ENT who recommended outpatient follow-up.  Should she have new or worsening symptoms needs return to the emergency room.          Patient Care Considerations:          Consultants/Shared Management Plan:    Consultant: I have discussed the case with Dr. Jaffe who states recommends steroids, continue to biotics, outpatient follow-up.    Social Determinants of Health:    Patient is independent, reliable, and has access to care.       Disposition and Care Coordination:    Discharged: I considered escalation of care by admitting this patient to the hospital, however the patient has improved and is suitable and  stable for discharge.    I have explained the patient´s condition, diagnoses and treatment plan based on the information available to me at this time. I have answered questions and addressed any concerns. The patient has a good  understanding of the patient´s diagnosis, condition, and treatment plan as can be expected at this point. The vital signs have been stable. The patient´s condition is stable and appropriate for discharge from the emergency department.      The patient will pursue further outpatient evaluation with the primary care physician or other designated or consulting physician as outlined in the discharge instructions. They are agreeable to this plan of care and follow-up instructions have been explained in detail. The patient has received these instructions in written format and have expressed an understanding of the discharge instructions. The patient is aware that any significant change in condition or worsening of symptoms should prompt an immediate return to this or the closest emergency department or call to 911.      Final diagnoses:   Tonsillitis        ED Disposition       ED Disposition   Discharge    Condition    Stable    Comment   --               This medical record created using voice recognition software.             Chad Michael MD  02/20/24 0958

## 2024-02-29 DIAGNOSIS — I10 ESSENTIAL HYPERTENSION: ICD-10-CM

## 2024-02-29 RX ORDER — LOSARTAN POTASSIUM 25 MG/1
25 TABLET ORAL
Qty: 90 TABLET | Refills: 3 | OUTPATIENT
Start: 2024-02-29

## 2024-02-29 NOTE — ASSESSMENT & PLAN NOTE
Most recent LDL elevated at 176.  She has been intolerant to statin therapies, was not on any medication at that time, she has since started on Repatha.  Need to update fasting lipid profile.

## 2024-02-29 NOTE — ASSESSMENT & PLAN NOTE
She is stable without any general symptoms.  She is experiencing shortness of breath with Brilinta, after 1 year of this, she was transition to Plavix, and is tolerating this well.  Continue DAPT therapy with aspirin, Plavix, and beta-blocker.

## 2024-03-04 ENCOUNTER — OFFICE VISIT (OUTPATIENT)
Dept: FAMILY MEDICINE CLINIC | Facility: CLINIC | Age: 59
End: 2024-03-04
Payer: COMMERCIAL

## 2024-03-04 ENCOUNTER — LAB (OUTPATIENT)
Dept: LAB | Facility: HOSPITAL | Age: 59
End: 2024-03-04
Payer: COMMERCIAL

## 2024-03-04 VITALS
BODY MASS INDEX: 36.59 KG/M2 | TEMPERATURE: 98 F | HEART RATE: 92 BPM | DIASTOLIC BLOOD PRESSURE: 80 MMHG | OXYGEN SATURATION: 96 % | SYSTOLIC BLOOD PRESSURE: 115 MMHG | HEIGHT: 65 IN | WEIGHT: 219.6 LBS

## 2024-03-04 DIAGNOSIS — Z79.51 COPD ON LONG-TERM INHALED STEROID THERAPY: ICD-10-CM

## 2024-03-04 DIAGNOSIS — K21.9 GASTROESOPHAGEAL REFLUX DISEASE WITHOUT ESOPHAGITIS: ICD-10-CM

## 2024-03-04 DIAGNOSIS — J43.9 PULMONARY EMPHYSEMA, UNSPECIFIED EMPHYSEMA TYPE: Primary | ICD-10-CM

## 2024-03-04 DIAGNOSIS — Z72.0 TOBACCO USE: ICD-10-CM

## 2024-03-04 DIAGNOSIS — E78.2 MIXED HYPERLIPIDEMIA: ICD-10-CM

## 2024-03-04 DIAGNOSIS — F32.0 CURRENT MILD EPISODE OF MAJOR DEPRESSIVE DISORDER WITHOUT PRIOR EPISODE: ICD-10-CM

## 2024-03-04 DIAGNOSIS — J44.9 COPD ON LONG-TERM INHALED STEROID THERAPY: ICD-10-CM

## 2024-03-04 DIAGNOSIS — F41.9 ANXIETY: ICD-10-CM

## 2024-03-04 DIAGNOSIS — I10 ESSENTIAL HYPERTENSION: ICD-10-CM

## 2024-03-04 DIAGNOSIS — R35.0 URINARY FREQUENCY: ICD-10-CM

## 2024-03-04 DIAGNOSIS — R39.15 URINARY URGENCY: ICD-10-CM

## 2024-03-04 DIAGNOSIS — J30.9 ALLERGIC RHINITIS, UNSPECIFIED SEASONALITY, UNSPECIFIED TRIGGER: ICD-10-CM

## 2024-03-04 LAB
ALBUMIN SERPL-MCNC: 3.9 G/DL (ref 3.5–5.2)
ALBUMIN/GLOB SERPL: 1.3 G/DL
ALP SERPL-CCNC: 69 U/L (ref 39–117)
ALT SERPL W P-5'-P-CCNC: 20 U/L (ref 1–33)
ANION GAP SERPL CALCULATED.3IONS-SCNC: 10.6 MMOL/L (ref 5–15)
AST SERPL-CCNC: 20 U/L (ref 1–32)
BASOPHILS # BLD AUTO: 0.07 10*3/MM3 (ref 0–0.2)
BASOPHILS NFR BLD AUTO: 0.7 % (ref 0–1.5)
BILIRUB BLD-MCNC: NEGATIVE MG/DL
BILIRUB SERPL-MCNC: 0.2 MG/DL (ref 0–1.2)
BUN SERPL-MCNC: 11 MG/DL (ref 6–20)
BUN/CREAT SERPL: 16.7 (ref 7–25)
CALCIUM SPEC-SCNC: 9.4 MG/DL (ref 8.6–10.5)
CHLORIDE SERPL-SCNC: 103 MMOL/L (ref 98–107)
CHOLEST SERPL-MCNC: 234 MG/DL (ref 0–200)
CLARITY, POC: CLEAR
CO2 SERPL-SCNC: 22.4 MMOL/L (ref 22–29)
COLOR UR: YELLOW
CREAT SERPL-MCNC: 0.66 MG/DL (ref 0.57–1)
DEPRECATED RDW RBC AUTO: 45.6 FL (ref 37–54)
EGFRCR SERPLBLD CKD-EPI 2021: 101.8 ML/MIN/1.73
EOSINOPHIL # BLD AUTO: 0.26 10*3/MM3 (ref 0–0.4)
EOSINOPHIL NFR BLD AUTO: 2.4 % (ref 0.3–6.2)
ERYTHROCYTE [DISTWIDTH] IN BLOOD BY AUTOMATED COUNT: 13.5 % (ref 12.3–15.4)
EXPIRATION DATE: ABNORMAL
GLOBULIN UR ELPH-MCNC: 2.9 GM/DL
GLUCOSE SERPL-MCNC: 79 MG/DL (ref 65–99)
GLUCOSE UR STRIP-MCNC: NEGATIVE MG/DL
HCT VFR BLD AUTO: 41.8 % (ref 34–46.6)
HDLC SERPL-MCNC: 40 MG/DL (ref 40–60)
HGB BLD-MCNC: 14.1 G/DL (ref 12–15.9)
IMM GRANULOCYTES # BLD AUTO: 0.06 10*3/MM3 (ref 0–0.05)
IMM GRANULOCYTES NFR BLD AUTO: 0.6 % (ref 0–0.5)
KETONES UR QL: NEGATIVE
LDLC SERPL CALC-MCNC: 172 MG/DL (ref 0–100)
LDLC/HDLC SERPL: 4.26 {RATIO}
LEUKOCYTE EST, POC: ABNORMAL
LYMPHOCYTES # BLD AUTO: 2.61 10*3/MM3 (ref 0.7–3.1)
LYMPHOCYTES NFR BLD AUTO: 24.3 % (ref 19.6–45.3)
Lab: ABNORMAL
MCH RBC QN AUTO: 31.3 PG (ref 26.6–33)
MCHC RBC AUTO-ENTMCNC: 33.7 G/DL (ref 31.5–35.7)
MCV RBC AUTO: 92.9 FL (ref 79–97)
MONOCYTES # BLD AUTO: 0.77 10*3/MM3 (ref 0.1–0.9)
MONOCYTES NFR BLD AUTO: 7.2 % (ref 5–12)
NEUTROPHILS NFR BLD AUTO: 6.95 10*3/MM3 (ref 1.7–7)
NEUTROPHILS NFR BLD AUTO: 64.8 % (ref 42.7–76)
NITRITE UR-MCNC: NEGATIVE MG/ML
NRBC BLD AUTO-RTO: 0 /100 WBC (ref 0–0.2)
PH UR: 7 [PH] (ref 5–8)
PLATELET # BLD AUTO: 314 10*3/MM3 (ref 140–450)
PMV BLD AUTO: 10.4 FL (ref 6–12)
POTASSIUM SERPL-SCNC: 4.5 MMOL/L (ref 3.5–5.2)
PROT SERPL-MCNC: 6.8 G/DL (ref 6–8.5)
PROT UR STRIP-MCNC: NEGATIVE MG/DL
RBC # BLD AUTO: 4.5 10*6/MM3 (ref 3.77–5.28)
RBC # UR STRIP: ABNORMAL /UL
SODIUM SERPL-SCNC: 136 MMOL/L (ref 136–145)
SP GR UR: 1.02 (ref 1–1.03)
TRIGL SERPL-MCNC: 119 MG/DL (ref 0–150)
TSH SERPL DL<=0.05 MIU/L-ACNC: 1.72 UIU/ML (ref 0.27–4.2)
UROBILINOGEN UR QL: NORMAL
VLDLC SERPL-MCNC: 22 MG/DL (ref 5–40)
WBC NRBC COR # BLD AUTO: 10.72 10*3/MM3 (ref 3.4–10.8)

## 2024-03-04 PROCEDURE — 87086 URINE CULTURE/COLONY COUNT: CPT | Performed by: NURSE PRACTITIONER

## 2024-03-04 PROCEDURE — 80061 LIPID PANEL: CPT

## 2024-03-04 PROCEDURE — 36415 COLL VENOUS BLD VENIPUNCTURE: CPT

## 2024-03-04 PROCEDURE — 85025 COMPLETE CBC W/AUTO DIFF WBC: CPT

## 2024-03-04 PROCEDURE — 80053 COMPREHEN METABOLIC PANEL: CPT

## 2024-03-04 PROCEDURE — 84443 ASSAY THYROID STIM HORMONE: CPT

## 2024-03-04 RX ORDER — SERTRALINE HYDROCHLORIDE 100 MG/1
150 TABLET, FILM COATED ORAL NIGHTLY
Qty: 135 TABLET | Refills: 1 | Status: SHIPPED | OUTPATIENT
Start: 2024-03-04

## 2024-03-04 RX ORDER — ALBUTEROL SULFATE 0.63 MG/3ML
1 SOLUTION RESPIRATORY (INHALATION) EVERY 6 HOURS PRN
Qty: 3 ML | Refills: 12 | Status: SHIPPED | OUTPATIENT
Start: 2024-03-04

## 2024-03-04 RX ORDER — TIOTROPIUM BROMIDE 18 UG/1
1 CAPSULE ORAL; RESPIRATORY (INHALATION)
Qty: 90 CAPSULE | Refills: 1 | Status: SHIPPED | OUTPATIENT
Start: 2024-03-04

## 2024-03-04 RX ORDER — LOSARTAN POTASSIUM 50 MG/1
50 TABLET ORAL DAILY
Qty: 90 TABLET | Refills: 1 | Status: SHIPPED | OUTPATIENT
Start: 2024-03-04

## 2024-03-04 RX ORDER — LORATADINE 10 MG/1
10 TABLET ORAL DAILY
Qty: 90 TABLET | Refills: 1 | Status: SHIPPED | OUTPATIENT
Start: 2024-03-04

## 2024-03-04 NOTE — PROGRESS NOTES
Chief Complaint  Hypertension, Coronary Artery Disease, Hyperlipidemia, Heartburn, and COPD    SUBJECTIVE  Lindsey Mckinnon presents to [facility]    Hypertension/CAD:  Patient is taking Bisoprolol, Plavix, ASA, Losartan.  Patient's Blood Pressure in clinic today is 115/80.  Patient does not monitor blood pressure at home.  Patient denies chest pain, shortness of air, headache, flushing, abnormal swelling in feet/ankles.      Hyperlipidemia:  Patient is taking Repatha.  Patient denies nocturnal leg cramps, myalgias.  Patient attempts to maintain a diet low in fat and carbohydrates.    Gastroesophageal Reflux:  Patient is taking Aciphex, with good control of symptoms.  Patient does not need over the counter medications for breakthrough symptoms.  Patient tries to avoid trigger foods, eat frequent small meals, not lie down within 2 hours of eating, avoids NSAIDS medications and alcohol.    COPD:  Patient is taking Claritin, Spiriva.  Patient is no longer able to get Breo Ellipta due to insurance.    Patient seen at [facility] per her sister's PCP and diagnosed with UTI.  She completed antibiotics and would like to have her urine rechecked.    Pt c/o enlarged lymph nodes, she went to urgent care and then the ER and was referred to ENT. Pt completed augmentin for 10 days.  Patient had elevated white blood cell count while at the hospital.  Patient states her symptoms are now resolved.  She is no longer having neck lymphadenopathy or difficulty swallowing.  Since completing the antibiotic.    History of Present Illness  Past Medical History:   Diagnosis Date    Acute ST elevation myocardial infarction (STEMI) involving right coronary artery 08/25/2022    Alcoholism     Anemia     Anxiety     Arthritis     Asthma     Bipolar disorder     Breast lump     Cervical spine pain     Chronic allergic rhinitis     Chronic pain disorder     COPD (chronic obstructive pulmonary  disease) 04/15/2021    Coronary artery disease     Deep vein thrombosis     Degenerative disc disease, cervical     Depression     Esophageal reflux     Essential hypertension 04/15/2021    Fibromyalgia     Forgetfulness     Gall stones     GERD (gastroesophageal reflux disease)     H/O emphysema     Head injury     Heart attack 2022    Hemorrhoid     Hernia cerebri     Hernia, hiatal     Herniated disc, cervical     Hyperlipidemia 04/15/2021    Kidney stone     Limb swelling     Lumbar pain     Lumbosacral disc disease     Migraine     Mood disorder     Muscle cramps     Nicotine dependence 04/15/2021    Peripheral neuropathy     Reflux esophagitis     Shortness of breath     Spinal stenosis     STEMI (ST elevation myocardial infarction) 2022    Thoracic disc disorder     Tremor 04/15/2021      Family History   Problem Relation Age of Onset    Depression Mother     Bipolar disorder Mother     Lung cancer Mother         MALIGNANT    Diabetes Mother         UNSPECIFIED TYPE    Cancer Mother         BLADDER    Osteoporosis Mother     Arthritis Mother     Lung cancer Father         MALIGNANT    Diabetes Father         UNSPECIFIED TYPE/ MELLITUS    Heart attack Father     Kidney cancer Father     Cancer Father         BLADDER    Arthritis Father     Heart disease Father     OCD Brother     Depression Brother     Diabetes Brother         UNSPECIFIED TYPE/MELLITUS    Kidney cancer Brother     Arthritis Brother     Paranoid behavior Brother     OCD Brother     Arthritis Son       Past Surgical History:   Procedure Laterality Date    CARDIAC CATHETERIZATION N/A 2022    Procedure: Left Heart Cath;  Surgeon: Adam Helms MD;  Location: [facility] INVASIVE LOCATION;  Service: Cardiovascular;  Laterality: N/A;    CAROTID STENT      CERVICAL EPIDURAL      STERIOD INJECTIONS      SECTION      X 2     SECTION  ,    CHOLECYSTECTOMY      ENDOSCOPY      EPIDURAL BLOCK      GALLBLADDER  "SURGERY  2020    HYSTERECTOMY      HYSTERECTOMY  2005    LUMBAR EPIDURAL INJECTION      STERIOD    TRIGGER POINT INJECTION      VASCULAR SURGERY          Current Outpatient Medications:     aspirin 81 MG chewable tablet, CHEW AND SWALLOW 1 TABLET BY MOUTH DAILY, Disp: 99 tablet, Rfl: 0    bisoprolol (ZEBeta) 5 MG tablet, Take 1 tablet by mouth Daily for 90 days., Disp: 90 tablet, Rfl: 3    clopidogrel (PLAVIX) 75 MG tablet, Take 1 tablet by mouth Daily., Disp: 90 tablet, Rfl: 3    Evolocumab (REPATHA) solution auto-injector SureClick injection, Inject 1 mL under the skin into the appropriate area as directed Every 14 (Fourteen) Days., Disp: 6 mL, Rfl: 3    loratadine (CLARITIN) 10 MG tablet, Take 1 tablet by mouth Daily., Disp: 90 tablet, Rfl: 1    losartan (COZAAR) 50 MG tablet, Take 1 tablet by mouth Daily., Disp: 90 tablet, Rfl: 1    metaxalone (SKELAXIN) 800 MG tablet, Take 1 tablet by mouth 3 (Three) Times a Day As Needed., Disp: , Rfl:     oxyCODONE ER (oxyCONTIN) 15 MG tablet extended-release 12 hour, Take 1 tablet by mouth Every 12 (Twelve) Hours., Disp: , Rfl:     RABEprazole (ACIPHEX) 20 MG EC tablet, Take 1 tablet by mouth Daily., Disp: , Rfl:     sertraline (ZOLOFT) 100 MG tablet, Take 1.5 tablets by mouth Every Night., Disp: 135 tablet, Rfl: 1    tiotropium (Spiriva HandiHaler) 18 MCG per inhalation capsule, Place 1 capsule into inhaler and inhale Daily., Disp: 90 capsule, Rfl: 1    albuterol (ACCUNEB) 0.63 MG/3ML nebulizer solution, Take 3 mL by nebulization Every 6 (Six) Hours As Needed for Wheezing., Disp: 3 mL, Rfl: 12    OBJECTIVE  Vital Signs:   /80 (BP Location: Left arm, Patient Position: Sitting, Cuff Size: Large Adult)   Pulse 92   Temp 98 °F (36.7 °C) (Oral)   Ht 165.1 cm (65\")   Wt 99.6 kg (219 lb 9.6 oz)   SpO2 96%   BMI 36.54 kg/m²    Estimated body mass index is 36.54 kg/m² as calculated from the following:    Height as of this encounter: 165.1 cm (65\").    Weight as of this " encounter: 99.6 kg (219 lb 9.6 oz).     Wt Readings from Last 3 Encounters:   03/04/24 99.6 kg (219 lb 9.6 oz)   02/20/24 100 kg (220 lb 7.4 oz)   02/12/24 98.5 kg (217 lb 3.2 oz)     BP Readings from Last 3 Encounters:   03/04/24 115/80   02/20/24 141/99   02/19/24 158/88       Physical Exam  Vitals reviewed.   Constitutional:       Appearance: Normal appearance. She is well-developed.   HENT:      Head: Normocephalic and atraumatic.      Right Ear: External ear normal.      Left Ear: External ear normal.      Mouth/Throat:      Pharynx: No oropharyngeal exudate.   Eyes:      Conjunctiva/sclera: Conjunctivae normal.      Pupils: Pupils are equal, round, and reactive to light.   Neck:      Vascular: No carotid bruit.   Cardiovascular:      Rate and Rhythm: Normal rate and regular rhythm.      Pulses: Normal pulses.      Heart sounds: Normal heart sounds. No murmur heard.     No friction rub. No gallop.   Pulmonary:      Effort: Pulmonary effort is normal.      Breath sounds: Normal breath sounds. No wheezing or rhonchi.   Skin:     General: Skin is warm and dry.   Neurological:      Mental Status: She is alert and oriented to person, place, and time.      Cranial Nerves: No cranial nerve deficit.   Psychiatric:         Mood and Affect: Mood and affect normal.         Behavior: Behavior normal.         Thought Content: Thought content normal.         Judgment: Judgment normal.          Result Review    CBC          4/26/2023    12:16 8/29/2023    11:57 2/20/2024    06:43   CBC   WBC 11.82  10.12  13.37    RBC 5.06  4.45  4.71    Hemoglobin 15.4  14.0  14.5    Hematocrit 46.6  42.1  44.6    MCV 92.1  94.6  94.7    MCH 30.4  31.5  30.8    MCHC 33.0  33.3  32.5    RDW 13.1  13.1  14.6    Platelets 314  327  293          POCT urinalysis dipstick, automated  Order: [id number]  Status: Final result       Visible to patient: No (scheduled for 3/4/2024  2:43 PM)       Next appt: 09/04/2024 at 01:30 PM in Family Medicine  (Mady Mcnally, APRN)       Dx: Urinary frequency; Urinary urgency    Specimen Information: Urine   0 Result Notes            Component  Ref Range & Units 13:42  (3/4/24) 2 wk ago  (2/19/24) 2 wk ago  (2/19/24) 2 wk ago  (2/19/24) 1 mo ago  (1/24/24) 4 mo ago  (10/10/23) 6 mo ago  (8/29/23)   Color  Yellow, Straw, Dark Yellow, Bernadine Yellow    Yellow R Dark Yellow Abnormal  R Yellow R   Clarity, UA  Clear Clear     Clear Clear   Specific Gravity  1.005 - 1.030 1.020    1.020 R 1.026 1.012   pH, Urine  5.0 - 8.0 7.0    5 R 6.0 6.0   Leukocytes  Negative Trace Abnormal     500 Karley/ul Abnormal  Trace Abnormal  Moderate (2+) Abnormal    Nitrite, UA  Negative Negative    Negative Negative Negative   Protein, POC  Negative mg/dL Negative     Negative R Negative R   Glucose, UA  Negative mg/dL Negative     Negative R Negative R   Ketones, UA  Negative Negative     Negative Negative   Urobilinogen, UA  Normal, 0.2 E.U./dL Normal    1 mg/dL Abnormal  R 0.2 E.U./dL R 0.2 E.U./dL R   Bilirubin  Negative Negative     Negative Negative   Blood, UA  Negative Trace Abnormal     250 Deuce/uL Abnormal  Negative Trace Abnormal    Lot Number [id number]     Expiration Date 4/30/24 12,052,025 7,032,024 2,272,025 06/30/2024     Rapid Strep A Screen    Negative      External Bilirubin, Urine     Negative R     QC     Acceptable R     Comment          Internal Control  Passed R Passed R Passed      Rapid Influenza A Ag  Negative R        Rapid Influenza B Ag  Negative R        SARS Antigen   Not Detected R       Appearance, Fluid     Clear R     Total Protein, urine     Negative R     Glucose     Normal R     External Ketones, Urine     15 mg/dL Abnormal  R     Resulting Agency [facility]             The above data has been reviewed by SOFIA Grullon 03/04/2024 11:13 EST.           Patient Care Team:  Mady Mcnally APRN as PCP - General (Family Medicine)  Arminda Arteaga APRN as Nurse Practitioner (Pain Medicine)  Adam Helms MD as Consulting Physician (Cardiology)  Franco Landa PA-C as Physician Assistant (Physician Assistant)  Carlo Drew Jr., MD as Consulting Physician (Pain Medicine)            ASSESSMENT & PLAN    Diagnoses and all orders for this visit:    1. Pulmonary emphysema, unspecified emphysema type (Primary)  Comments:  Continue inhaler and albuterol nebulizer as needed  Orders:  -     albuterol (ACCUNEB) 0.63 MG/3ML nebulizer solution; Take 3 mL by nebulization Every 6 (Six) Hours As Needed for Wheezing.  Dispense: 3 mL; Refill: 12    2. Allergic rhinitis, unspecified seasonality, unspecified trigger  Comments:  Symptoms well controlled with current medication regimen, cont. Current meds.  Orders:  -     loratadine (CLARITIN) 10 MG tablet; Take 1 tablet by mouth Daily.  Dispense: 90 tablet; Refill: 1    3. Essential hypertension  Comments:  BP well controlled, continue current medication  Orders:  -     Comprehensive Metabolic Panel; Future  -     CBC & Differential; Future  -     TSH; Future  -     losartan (COZAAR) 50 MG tablet; Take 1 tablet by mouth Daily.  Dispense: 90 tablet; Refill: 1    4. Anxiety  Comments:  Well-controlled with Zoloft, continue current medication  Orders:  -     sertraline (ZOLOFT) 100 MG tablet; Take 1.5 tablets by mouth Every Night.  Dispense: 135 tablet; Refill: 1    5. Current mild episode of major depressive disorder without prior episode  Comments:  Well-controlled with Zoloft, continue current medication  Orders:  -     sertraline (ZOLOFT) 100 MG tablet; Take 1.5 tablets by mouth Every Night.  Dispense: 135 tablet; Refill: 1    6. COPD on long-term inhaled steroid therapy  Comments:  Stable, encourage smoking cessation.  Orders:  -     tiotropium (Spiriva HandiHaler) 18 MCG per inhalation capsule; Place 1 capsule into  inhaler and inhale Daily.  Dispense: 90 capsule; Refill: 1    7. Tobacco use  Overview:  Pt cont to smoke and not ready to quit      8. Mixed hyperlipidemia  -     Lipid Panel; Future    9. Gastroesophageal reflux disease without esophagitis  Comments:  Symptoms well controlled with current medication regimen, cont. Current meds.    10. Urinary frequency  -     POCT urinalysis dipstick, automated  -     Urine Culture - Urine, Urine, Clean Catch; Future    11. Urinary urgency  -     POCT urinalysis dipstick, automated  -     Urine Culture - Urine, Urine, Clean Catch; Future     Lindsey Mckinnon  reports that she has been smoking cigarettes. She has a 7.5 pack-year smoking history. She has never used smokeless tobacco.. I have educated her on the risk of diseases from using tobacco products such as cancer, COPD, and heart disease.     I advised her to quit and she is not willing to quit.    I spent 10 minutes counseling the patient.           Tobacco Use: High Risk (3/4/2024)    Patient History     Smoking Tobacco Use: Every Day     Smokeless Tobacco Use: Never     Passive Exposure: Not on file       Follow Up     Return in about 6 months (around 9/4/2024).      Patient was given instructions and counseling regarding her condition or for health maintenance advice. Please see specific information pulled into the AVS if appropriate.   I have reviewed information obtained and documented by others and I have confirmed the accuracy of this documented note.    SOFIA Grullon

## 2024-03-05 DIAGNOSIS — E78.2 MIXED HYPERLIPIDEMIA: Primary | ICD-10-CM

## 2024-03-05 LAB — BACTERIA SPEC AEROBE CULT: NO GROWTH

## 2024-03-15 ENCOUNTER — TELEPHONE (OUTPATIENT)
Dept: CARDIOLOGY | Facility: CLINIC | Age: 59
End: 2024-03-15
Payer: COMMERCIAL

## 2024-03-15 NOTE — TELEPHONE ENCOUNTER
Message from Plan  The request has been approved. The authorization is effective from 03/15/2024 to 03/15/2025, as long as the member is enrolled in their current health plan. A written notification letter will follow with additional details.. Authorization Expiration Date: March 15, 2025.

## 2024-04-18 ENCOUNTER — OFFICE VISIT (OUTPATIENT)
Dept: NEUROSURGERY | Facility: CLINIC | Age: 59
End: 2024-04-18
Payer: COMMERCIAL

## 2024-04-18 VITALS — BODY MASS INDEX: 36.96 KG/M2 | HEIGHT: 65 IN | WEIGHT: 221.8 LBS

## 2024-04-18 DIAGNOSIS — G89.29 CHRONIC HIP PAIN, RIGHT: ICD-10-CM

## 2024-04-18 DIAGNOSIS — M25.551 CHRONIC HIP PAIN, RIGHT: ICD-10-CM

## 2024-04-18 DIAGNOSIS — M54.50 CHRONIC RIGHT-SIDED LOW BACK PAIN WITHOUT SCIATICA: Primary | ICD-10-CM

## 2024-04-18 DIAGNOSIS — G89.29 CHRONIC RIGHT-SIDED LOW BACK PAIN WITHOUT SCIATICA: Primary | ICD-10-CM

## 2024-04-18 NOTE — PROGRESS NOTES
"  Lindsey Mckinnon is a 58 y.o. female that presents with Back Pain       She has noticed worsening lower back pain. This has been present for 8 or so years. It is worse over the last 4 months or so. She doesn't have any \"sciatica.\" She has some right leg numbness. She has pain radiating into the right groin.     Back Pain      Review of Systems   Musculoskeletal:  Positive for back pain and myalgias.        Physical Exam  Constitutional:       Comments: BMI 36.9   Cardiovascular:      Comments: No notable edema   Pulmonary:      Effort: Pulmonary effort is normal.   Neurological:      Mental Status: She is alert.      Sensory: No sensory deficit.      Motor: No weakness.      Deep Tendon Reflexes: Reflexes normal.   Psychiatric:         Mood and Affect: Mood normal.     She has pain with external rotation of the right hip.       I personally reviewed the patient's MRI scan which shows multilevel DDD with multilevel facet arthritis. This is most notable at L5-S1.     She has some foraminal narrowing at L5-S1.      Assessment and Plan {CC Problem List  Visit Diagnosis  ROS  Review (Popup)  [facility] Maintenance  Quality  BestPractice  Medications  SmartSets  SnapShot Encounters  Media :23}   Problem List Items Addressed This Visit       Low back pain - Primary     Other Visit Diagnoses       Chronic hip pain, right            She would not likely benefit from lumbar surgery.    She might benefit from facet injection/ablation, particularly at L5-S1 on the right.    We will obtain a right hip x-ray.    Follow Up {Instructions Charge Capture  Follow-up Communications :23}   No follow-ups on file.  "

## 2024-04-19 ENCOUNTER — TELEPHONE (OUTPATIENT)
Dept: CARDIOLOGY | Facility: CLINIC | Age: 59
End: 2024-04-19
Payer: COMMERCIAL

## 2024-04-19 NOTE — TELEPHONE ENCOUNTER
REQUEST FOR CARDIAC CLEARANCE    Caller name: Lindsey Mckinnon     Phone Number: [phone] (home)      Surgeon's name:  @ [facility]     Type of planned surgery: ABLATION     Date of planned surgery: UNPLANNED     Type of anesthesia: UNSURE,BUT THINKS IT COULD BE JUST LOCAL     Have you been experiencing chest pain or shortness of breath? YES    Is your doctor requesting for you to stop any of your medications prior to your surgery? YES, 5 DAYS BEFORE STOP PLAVIX    Where should we fax the clearance to? UNSURE

## 2024-04-19 NOTE — TELEPHONE ENCOUNTER
Procedure: Possible Facet Injection/Ablation, particularly at L5-S1    Medication Directive: Plavix and aspirin    PMH: CAD, Ischemic Cardiomyopathy, HTN, HLD    Last Seen: 02/12/24    ECHO: 10/20/23     Left ventricular systolic function is normal. Left ventricular ejection fraction appears to be 56 - 60%.  There are no obvious regional wall motion abnormalities.    Left ventricular wall thickness is consistent with mild concentric hypertrophy.    Left ventricular diastolic function is consistent with (grade I) impaired relaxation.    There are no significant valvular abnormalities.    Estimated right ventricular systolic pressure from tricuspid regurgitation is normal (<35 mmHg).

## 2024-04-22 NOTE — TELEPHONE ENCOUNTER
Patient may proceed with upcoming surgical procedure at moderate risk, and may hold Plavix for 5 days prior to procedure and aspirin for 7 days prior to procedure if needed.

## 2024-05-10 ENCOUNTER — HOSPITAL ENCOUNTER (OUTPATIENT)
Dept: GENERAL RADIOLOGY | Facility: HOSPITAL | Age: 59
Discharge: HOME | End: 2024-05-10
Payer: COMMERCIAL

## 2024-05-10 DIAGNOSIS — M25.551 CHRONIC HIP PAIN, RIGHT: ICD-10-CM

## 2024-05-10 DIAGNOSIS — G89.29 CHRONIC HIP PAIN, RIGHT: ICD-10-CM

## 2024-05-10 PROCEDURE — 73502 X-RAY EXAM HIP UNI 2-3 VIEWS: CPT

## 2024-05-14 ENCOUNTER — TELEPHONE (OUTPATIENT)
Dept: NEUROSURGERY | Facility: CLINIC | Age: 59
End: 2024-05-14
Payer: COMMERCIAL

## 2024-05-14 DIAGNOSIS — M16.0 BILATERAL HIP JOINT ARTHRITIS: Primary | ICD-10-CM

## 2024-05-14 NOTE — TELEPHONE ENCOUNTER
Patient was notified of x-ray result. Ortho referral created. She is aware she will be contacted to schedule.

## 2024-05-14 NOTE — TELEPHONE ENCOUNTER
Provider: DR JACKSON    Caller: SYED REVELES    Relationship to Patient: SELF      Phone Number: [phone]    Reason for Call: RETURNING A CALL TO [facility].  SAYS OK FOR HUB TO RELAY MESSAGE, BUT I'M NOT SURE WHAT THE MESSAGE IS TO RELAY.    PLEASE CALL PATIENT.

## 2024-05-24 ENCOUNTER — OFFICE VISIT (OUTPATIENT)
Dept: ORTHOPEDIC SURGERY | Facility: CLINIC | Age: 59
End: 2024-05-24
Payer: COMMERCIAL

## 2024-05-24 VITALS
SYSTOLIC BLOOD PRESSURE: 120 MMHG | WEIGHT: 221 LBS | HEART RATE: 97 BPM | BODY MASS INDEX: 36.82 KG/M2 | DIASTOLIC BLOOD PRESSURE: 69 MMHG | HEIGHT: 65 IN | OXYGEN SATURATION: 98 %

## 2024-05-24 DIAGNOSIS — M25.552 BILATERAL HIP PAIN: Primary | ICD-10-CM

## 2024-05-24 DIAGNOSIS — M25.551 BILATERAL HIP PAIN: Primary | ICD-10-CM

## 2024-05-24 PROCEDURE — 3074F SYST BP LT 130 MM HG: CPT | Performed by: ORTHOPAEDIC SURGERY

## 2024-05-24 PROCEDURE — 99203 OFFICE O/P NEW LOW 30 MIN: CPT | Performed by: ORTHOPAEDIC SURGERY

## 2024-05-24 PROCEDURE — 20610 DRAIN/INJ JOINT/BURSA W/O US: CPT | Performed by: ORTHOPAEDIC SURGERY

## 2024-05-24 PROCEDURE — 3078F DIAST BP <80 MM HG: CPT | Performed by: ORTHOPAEDIC SURGERY

## 2024-05-24 RX ADMIN — TRIAMCINOLONE ACETONIDE 40 MG: 40 INJECTION, SUSPENSION INTRA-ARTICULAR; INTRAMUSCULAR at 11:05

## 2024-05-24 RX ADMIN — LIDOCAINE HYDROCHLORIDE 5 ML: 10 INJECTION, SOLUTION INFILTRATION; PERINEURAL at 11:05

## 2024-05-24 NOTE — PROGRESS NOTES
"Chief Complaint  Initial Evaluation of the Left Hip and Initial Evaluation of the Right Hip     Subjective      Lindsey Mckinnon presents to [facility] for initial evaluation of bilateral hips.  She has spine issues also.  Her right hip is worse then the left.  She has decrease pain with bending forward.  She has had no injury or fall.  She has had a MRI of her back and nothing of her hips.     Allergies   Allergen Reactions    Morphine Itching    Sulfa Antibiotics Nausea And Vomiting        Social History     Socioeconomic History    Marital status:    Tobacco Use    Smoking status: Every Day     Current packs/day: 0.50     Average packs/day: 0.5 packs/day for 15.0 years (7.5 ttl pk-yrs)     Types: Cigarettes     Passive exposure: Never    Smokeless tobacco: Never    Tobacco comments:     STARTED AT AGE 15   Vaping Use    Vaping status: Never Used   Substance and Sexual Activity    Alcohol use: Not Currently    Drug use: Never     Types: Marijuana    Sexual activity: Not Currently     Birth control/protection: Tubal ligation        I reviewed the patient's chief complaint, history of present illness, review of systems, past medical history, surgical history, family history, social history, medications, and allergy list.     Review of Systems     Constitutional: Denies fevers, chills, weight loss  Cardiovascular: Denies chest pain, shortness of breath  Skin: Denies rashes, acute skin changes  Neurologic: Denies headache, loss of consciousness        Vital Signs:   /69   Pulse 97   Ht 165.1 cm (65\")   Wt 100 kg (221 lb)   SpO2 98%   BMI 36.78 kg/m²          Physical Exam  General: Alert. No acute distress    Ortho Exam      BILATERAL HIPS Hip Flexion 80. Internal rotation 20. External rotation 20. No skin discoloration, atrophy or swelling. Positive EHL, FHL, GS, and TA. Sensation intact to all 5 nerves of the foot. Positive straight leg raise. Leg lengths appear " equal. Ambulates with antalgic gait.     Right hip  Date/Time: 5/24/2024 11:05 AM  Consent given by: patient  Site marked: site marked  Timeout: Immediately prior to procedure a time out was called to verify the correct patient, procedure, equipment, support staff and site/side marked as required   Supporting Documentation  Indications: pain   Procedure Details  Location: hip (right hip) -   Needle size: 22 G (21 G)  Medications administered: 5 mL lidocaine 1 %; 40 mg triamcinolone acetonide 40 MG/ML  Patient tolerance: patient tolerated the procedure well with no immediate complications      This injection documentation was Scribed for Jose Beckwith MD by Abbie Donahue.  05/24/24   11:05 EDT      X-Ray Report:  Bilateral hip X-Ray  Indication: Evaluation of bilateral hips.   AP/Lateral view(s)  Findings: Left mild degenerative changes no acute fracture.  Degenerative changes to the lumbar region.  Right mild degenerative changes.   Prior studies available for comparison: no       Narrative & Impression   PROCEDURE:MRI LUMBAR SPINE W WO CONTRAST     COMPARISONS:3/15/2023; 11/9/2021; 12/7/2020.     INDICATIONS:  57-YEAR-OLD FEMALE W/ H/O INCREASING LOWER BACK PAIN, BILATERAL LEG NUMBNESS &   TINGLING; H/O SPINAL STENOSIS, IMBALANCE, NUMBNESS IN FINGERS & TOES; NO PRIOR H/O LUMBAR SPINE   SURGERY IS PROVIDED.     CONTRAST:18 mL of MultiHance, gadobenate dimeglumine, I.V.     TECHNIQUE:    A comprehensive examination was performed utilizing a variety of imaging planes and   imaging parameters to optimize visualization of suspected pathology within the lumbar spine.  178   magnetic resonance (MR) images were obtained before and after the administration of intravenous   gadolinium contrast.     FINDINGS:          PARASPINAL AREA:     Normal with no visible mass.    BONES:             No acute fracture, pars defect, or significant osseous lesion.    CORD/CAUDA EQUINA:            Normal caliber, contour, and signal  intensity.  The conus medullaris terminates   at about the L1-2 level, which is within normal limits.  No abnormal enhancement is seen involving   the distal cord or the cauda equina.  OTHER:             There may be mild dextroscoliosis of the lumbar spine.  Multiple-level disc degeneration is   seen, especially at T12-L1 as well as L2-3, L3-4, and L4-5.  S1 may be a transitional vertebra with   a rudimentary disc at S1-2.  Probably no significant anteroposterior (AP) alignment abnormality is   suspected.  There is nonspecific enhancement involving the facet joints on the right at L5-S1   (please see below for further detail regarding this finding).  No definite enhancement abnormality   is seen otherwise.  For instance, there is no convincing enhancement abnormality to suggest   infectious spondylodiscitis, epidural abscess, or paraspinal abscess.  No enhancement abnormality   is seen to suggest arachnoiditis or radiculitis.     LUMBAR DISC LEVELS  T12-L1: No significant disc/facet abnormality, spinal stenosis, or foraminal stenosis.    Small   Schmorl's nodes involve the superior endplate of L1, seen on prior studies.  There is mild   deformity of the superior endplate of L1 which is chronic.  L1-L2:   No significant disc/facet abnormality, spinal stenosis, or foraminal stenosis.  This level   was not imaged axially.  L2-L3:   There is diffuse annular disc bulge.  Moderate-to-severe facet osteoarthritis is seen.    There is subarticular narrowing on the left, which may have progressed slightly since the 11/9/2021   study.  A tiny synovial cyst(s) versus (more likely) tiny perineural cysts may be present   bilaterally, as seen on image 20 of series 5.  No disc herniation is seen.  Mild neural foraminal   narrowing is seen bilaterally.    L3-L4:   There is diffuse annular disc bulge with a posterior central annular fissure (or tear),   which is probably smaller than seen on the 11/9/2021 study.  No disc herniation  is seen.  There is   mild spinal canal narrowing.  Moderate-to-severe facet osteoarthritis is present.  There is mild   bilateral neural foraminal narrowing, greater on the left.  L4-L5:   There is asymmetric disc bulge in a left posterior-lateral location.  There is subarticular   narrowing, greater on the left.  It may have progressed slightly since the 11/9/2021 study.  There   is mild-to-moderate spinal canal narrowing.  Moderate-to-severe facet osteoarthritis is seen.    There is mild right foraminal narrowing.  There may be minimal left foraminal narrowing.  L5-S1:   There is severe facet osteoarthritis.  There is a facet joint effusion on right.  Diffuse   annular disc bulge is seen, which is asymmetric in a right posterolateral location with associated   subarticular narrowing on the right.  There is moderate-to-severe right foraminal narrowing.    Probably mild left foraminal narrowing is identified.  There is mild spinal canal stenosis.    Posterior ligamentous thickening is seen.  Probably no disc herniation.  There is asymmetric   enhancement of the right facet joint at L5-S1, which is thought most likely to be related to   inflammatory change.  An infectious process, as with septic facet arthritis, cannot be excluded but   is thought to be less likely.  No history of prior surgery in this region is provided.  These   findings are seen on images 8 through 13 of series 8.  They are less well appreciated on the axial   images because of the lack of fat suppression (such as seen on image 5 of series 9).  There may be   small synovial cysts associated with the posterior, inferior aspect right L5-S1 facet joint.     IMPRESSION:                 1. There is severe facet osteoarthritis on the right at L5-S1 with probably associated reactive   enhancement and signal abnormality.  Septic arthritis involving the right facet joint cannot be   excluded entirely.  Please correlate with pertinent lab values, such as  C-reactive protein (CRP),   erythrocyte sedimentation rate (ESR), and white blood cell (WBC) count.  There is no associated   focal sizable (drainable) fluid collection in this region.  There is a suspected right L5-S1 facet   joint effusion.    2. There is moderate-to-severe right foraminal narrowing at L5-S1.    3. There may have been some progression of the degenerative changes involving the L2-3, L4-5, and   L5-S1 levels.    4. Please see above comments for further detail.        Imaging Results (Most Recent)       None             Result Review :       XR Hip With or Without Pelvis 2 - 3 View Right    Result Date: 5/10/2024  Narrative: DATE OF EXAM: 5/10/2024 2:13 PM  PROCEDURE: XR HIP W OR WO PELVIS 2-3 VIEW RIGHT-  INDICATIONS: Right hip pain; M25.551-Pain in right hip; G89.29-Other chronic pain  COMPARISON: July 6, 2020  TECHNIQUE: 2 views of the right hip and AP view of the pelvis were obtained.  FINDINGS: Mineralization appears within normal limits. No acute displaced fracture is seen. Alignment appears within normal limits. There appears to be mild bilateral hip joint space narrowing and osteophyte formation. Mild degenerative changes are present at the sacroiliac joints. There is disc degeneration at the lumbosacral junction.      Impression:  1.  Mild bilateral hip osteoarthritis. 2.  No radiographic findings of acute osseous abnormality.  Electronically Signed By-Chad Rea MD On:5/10/2024 6:00 PM              Assessment and Plan     Diagnoses and all orders for this visit:    1. Bilateral hip pain (Primary)  -     XR Hip With or Without Pelvis 2 - 3 View Left; Future        Discussed the treatment plan with the patient. I reviewed the X-rays that were obtained today with the patient.     Discussed the risks and benefits of conservative measures.  The patient expressed understanding and wished to proceed with a right hip steroid injection.      MRI of  the pelvis to assess structure.     Educated on  risk of smoking/nicotine. Discussed options for smoking cessation. and Call or return if worsening symptoms.    Follow Up     After MRI of the pelvis.       Patient was given instructions and counseling regarding her condition or for health maintenance advice. Please see specific information pulled into the AVS if appropriate.     Scribed for Jose Beckwith MD by Yazmin Tellez MA.  05/24/24   10:26 EDT    I have personally performed the services described in this document as scribed by the above individual and it is both accurate and complete. Jose Beckwith MD 05/27/24

## 2024-05-27 RX ORDER — LIDOCAINE HYDROCHLORIDE 10 MG/ML
5 INJECTION, SOLUTION INFILTRATION; PERINEURAL
Status: COMPLETED | OUTPATIENT
Start: 2024-05-24 | End: 2024-05-24

## 2024-05-27 RX ORDER — TRIAMCINOLONE ACETONIDE 40 MG/ML
40 INJECTION, SUSPENSION INTRA-ARTICULAR; INTRAMUSCULAR
Status: COMPLETED | OUTPATIENT
Start: 2024-05-24 | End: 2024-05-24

## 2024-06-06 ENCOUNTER — TELEPHONE (OUTPATIENT)
Dept: ORTHOPEDIC SURGERY | Facility: CLINIC | Age: 59
End: 2024-06-06
Payer: COMMERCIAL

## 2024-06-06 DIAGNOSIS — M25.552 BILATERAL HIP PAIN: Primary | ICD-10-CM

## 2024-06-06 DIAGNOSIS — M25.551 BILATERAL HIP PAIN: Primary | ICD-10-CM

## 2024-07-02 ENCOUNTER — OFFICE VISIT (OUTPATIENT)
Dept: ORTHOPEDIC SURGERY | Facility: CLINIC | Age: 59
End: 2024-07-02
Payer: COMMERCIAL

## 2024-07-02 VITALS — HEART RATE: 104 BPM | OXYGEN SATURATION: 95 % | BODY MASS INDEX: 36.82 KG/M2 | WEIGHT: 221 LBS | HEIGHT: 65 IN

## 2024-07-02 DIAGNOSIS — M76.899 TENDINITIS OF HIP, UNSPECIFIED LATERALITY: ICD-10-CM

## 2024-07-02 DIAGNOSIS — M25.552 BILATERAL HIP PAIN: Primary | ICD-10-CM

## 2024-07-02 DIAGNOSIS — M25.551 BILATERAL HIP PAIN: Primary | ICD-10-CM

## 2024-07-02 RX ADMIN — LIDOCAINE HYDROCHLORIDE 5 ML: 10 INJECTION, SOLUTION INFILTRATION; PERINEURAL at 15:11

## 2024-07-02 RX ADMIN — TRIAMCINOLONE ACETONIDE 40 MG: 40 INJECTION, SUSPENSION INTRA-ARTICULAR; INTRAMUSCULAR at 15:11

## 2024-07-02 RX ADMIN — TRIAMCINOLONE ACETONIDE 40 MG: 40 INJECTION, SUSPENSION INTRA-ARTICULAR; INTRAMUSCULAR at 15:10

## 2024-07-02 RX ADMIN — LIDOCAINE HYDROCHLORIDE 5 ML: 10 INJECTION, SOLUTION INFILTRATION; PERINEURAL at 15:10

## 2024-07-02 NOTE — PROGRESS NOTES
"Chief Complaint  Follow-up and Pain of the Right Hip and Follow-up and Pain of the Left Hip     Subjective      Lindsey Mckinnon presents to [facility] for a follow up for bilateral hip pain. She was last seen in the office on 05/24/24 where we tried ordering an MRI on her hips however her insurance denied these. She had a right hip steroid injection done on 05/24/24 and states this gave her relief for about a month. She has pain with prolonged walking and pain with bending. She has tried home exercises without much relief. She reports no specific injury, trauma or falls since her last visit. Her right hip is worse than her left.     Allergies   Allergen Reactions    Morphine Itching    Sulfa Antibiotics Nausea And Vomiting        Social History     Socioeconomic History    Marital status:    Tobacco Use    Smoking status: Every Day     Current packs/day: 0.50     Average packs/day: 0.5 packs/day for 15.0 years (7.5 ttl pk-yrs)     Types: Cigarettes     Passive exposure: Never    Smokeless tobacco: Never    Tobacco comments:     STARTED AT AGE 15   Vaping Use    Vaping status: Never Used   Substance and Sexual Activity    Alcohol use: Not Currently    Drug use: Never     Types: Marijuana    Sexual activity: Not Currently     Birth control/protection: Tubal ligation        I reviewed the patient's chief complaint, history of present illness, review of systems, past medical history, surgical history, family history, social history, medications, and allergy list.     Review of Systems     Constitutional: Denies fevers, chills, weight loss  Cardiovascular: Denies chest pain, shortness of breath  Skin: Denies rashes, acute skin changes  Neurologic: Denies headache, loss of consciousness  MSK: Bilateral hip pain       Vital Signs:   Pulse 104   Ht 165.1 cm (65\")   Wt 100 kg (221 lb)   SpO2 95%   BMI 36.78 kg/m²            Ortho Exam    Physical Exam  General:Alert. No acute " distress   Bilateral lower extremity: Hip Flexion 80. Internal rotation 20. External rotation 20. No skin discoloration, atrophy or swelling. Positive EHL, FHL, GS, and TA. Sensation intact to all 5 nerves of the foot. Positive straight leg raise. Leg lengths appear equal. Ambulates with antalgic gait.     Right hip: R hip joint  Date/Time: 7/2/2024 3:10 PM  Consent given by: patient  Site marked: site marked  Timeout: Immediately prior to procedure a time out was called to verify the correct patient, procedure, equipment, support staff and site/side marked as required   Supporting Documentation  Indications: pain   Procedure Details  Location: hip - R hip joint  Needle gauge: 21 G.  Medications administered: 5 mL lidocaine 1 %; 40 mg triamcinolone acetonide 40 MG/ML  Patient tolerance: patient tolerated the procedure well with no immediate complications      Left hip  Date/Time: 7/2/2024 3:11 PM  Consent given by: patient  Site marked: site marked  Timeout: Immediately prior to procedure a time out was called to verify the correct patient, procedure, equipment, support staff and site/side marked as required   Supporting Documentation  Indications: pain   Procedure Details  Location: hip - Hip joint: left hip.  Needle gauge: 21 G.  Medications administered: 5 mL lidocaine 1 %; 40 mg triamcinolone acetonide 40 MG/ML  Patient tolerance: patient tolerated the procedure well with no immediate complications      This injection documentation was Scribed for Jose Beckwith MD by Edie Arreola MA.  07/02/24   15:21 EDT      Imaging Results (Most Recent)       None             Result Review :       No results found.           Assessment and Plan     Diagnoses and all orders for this visit:    1. Bilateral hip pain (Primary)    2. Tendinitis of hip, unspecified laterality        The patient presents here today for a follow up for bilateral hip pain.     MRI order placed today for bilateral hip.     Discussed the  risks and benefits of bilateral hip steroid injection today in the office. Patient expressed understanding and wishes to proceed. She tolerated the injection well and without any complications.     She will continue over the counter medications and home exercises.     Educated on risk of smoking/nicotine. Discussed options for smoking cessation. and Call or return if worsening symptoms.    Follow Up     After MRI       Patient was given instructions and counseling regarding her condition or for health maintenance advice. Please see specific information pulled into the AVS if appropriate.     Scribed for oJse Beckwith MD by Abbie Donahue.  07/02/24   14:25 EDT    I have personally performed the services described in this document as scribed by the above individual and it is both accurate and complete. Jose Beckwith MD 07/03/24

## 2024-07-03 RX ORDER — LIDOCAINE HYDROCHLORIDE 10 MG/ML
5 INJECTION, SOLUTION INFILTRATION; PERINEURAL
Status: COMPLETED | OUTPATIENT
Start: 2024-07-02 | End: 2024-07-02

## 2024-07-03 RX ORDER — TRIAMCINOLONE ACETONIDE 40 MG/ML
40 INJECTION, SUSPENSION INTRA-ARTICULAR; INTRAMUSCULAR
Status: COMPLETED | OUTPATIENT
Start: 2024-07-02 | End: 2024-07-02

## 2024-07-22 DIAGNOSIS — K21.00 GASTROESOPHAGEAL REFLUX DISEASE WITH ESOPHAGITIS WITHOUT HEMORRHAGE: Chronic | ICD-10-CM

## 2024-07-22 NOTE — TELEPHONE ENCOUNTER
Spoke with patient.  Cardiology changed patient's Rx from Protonix to Aciphex, she would prefer Protonix.  Patient to verify with Cardio that Protonix OK to take with her other meds prior to refilling.  Patient scheduled for acute visit 7/24/24.

## 2024-07-22 NOTE — TELEPHONE ENCOUNTER
The original prescription was discontinued on 2/12/2024 by Ashley Reynolds APRN for the following reason: *Therapy completed. Renewing this prescription may not be appropriate.     Need to call patient to see if she is still taking.

## 2024-07-25 ENCOUNTER — HOSPITAL ENCOUNTER (OUTPATIENT)
Dept: MRI IMAGING | Facility: HOSPITAL | Age: 59
Discharge: HOME | End: 2024-07-25
Payer: COMMERCIAL

## 2024-07-25 DIAGNOSIS — M76.899 TENDINITIS OF HIP, UNSPECIFIED LATERALITY: ICD-10-CM

## 2024-07-25 DIAGNOSIS — M25.552 BILATERAL HIP PAIN: ICD-10-CM

## 2024-07-25 DIAGNOSIS — M25.551 BILATERAL HIP PAIN: ICD-10-CM

## 2024-07-25 PROCEDURE — 73721 MRI JNT OF LWR EXTRE W/O DYE: CPT

## 2024-07-29 RX ORDER — PANTOPRAZOLE SODIUM 40 MG/1
40 TABLET, DELAYED RELEASE ORAL DAILY
Qty: 90 TABLET | Refills: 1 | OUTPATIENT
Start: 2024-07-29

## 2024-07-31 ENCOUNTER — OFFICE VISIT (OUTPATIENT)
Dept: FAMILY MEDICINE CLINIC | Facility: CLINIC | Age: 59
End: 2024-07-31
Payer: COMMERCIAL

## 2024-07-31 VITALS
SYSTOLIC BLOOD PRESSURE: 125 MMHG | DIASTOLIC BLOOD PRESSURE: 75 MMHG | BODY MASS INDEX: 37.92 KG/M2 | HEART RATE: 110 BPM | HEIGHT: 65 IN | OXYGEN SATURATION: 95 % | WEIGHT: 227.6 LBS | TEMPERATURE: 96.4 F

## 2024-07-31 DIAGNOSIS — F41.9 ANXIETY: ICD-10-CM

## 2024-07-31 DIAGNOSIS — J43.9 PULMONARY EMPHYSEMA, UNSPECIFIED EMPHYSEMA TYPE: ICD-10-CM

## 2024-07-31 DIAGNOSIS — F32.0 CURRENT MILD EPISODE OF MAJOR DEPRESSIVE DISORDER, UNSPECIFIED WHETHER RECURRENT: ICD-10-CM

## 2024-07-31 DIAGNOSIS — Z72.0 TOBACCO USE: Primary | ICD-10-CM

## 2024-07-31 DIAGNOSIS — M25.551 BILATERAL HIP PAIN: ICD-10-CM

## 2024-07-31 DIAGNOSIS — M25.552 BILATERAL HIP PAIN: ICD-10-CM

## 2024-07-31 DIAGNOSIS — Z12.31 SCREENING MAMMOGRAM FOR BREAST CANCER: ICD-10-CM

## 2024-07-31 DIAGNOSIS — R35.0 URINARY FREQUENCY: ICD-10-CM

## 2024-07-31 DIAGNOSIS — R53.83 OTHER FATIGUE: ICD-10-CM

## 2024-07-31 LAB
BILIRUB BLD-MCNC: NEGATIVE MG/DL
CLARITY, POC: CLEAR
COLOR UR: YELLOW
EXPIRATION DATE: ABNORMAL
GLUCOSE UR STRIP-MCNC: NEGATIVE MG/DL
KETONES UR QL: ABNORMAL
LEUKOCYTE EST, POC: ABNORMAL
Lab: ABNORMAL
NITRITE UR-MCNC: NEGATIVE MG/ML
PH UR: 6 [PH] (ref 5–8)
PROT UR STRIP-MCNC: NEGATIVE MG/DL
RBC # UR STRIP: ABNORMAL /UL
SP GR UR: 1.02 (ref 1–1.03)
UROBILINOGEN UR QL: NORMAL

## 2024-07-31 PROCEDURE — 99214 OFFICE O/P EST MOD 30 MIN: CPT | Performed by: NURSE PRACTITIONER

## 2024-07-31 PROCEDURE — 1160F RVW MEDS BY RX/DR IN RCRD: CPT | Performed by: NURSE PRACTITIONER

## 2024-07-31 PROCEDURE — 3074F SYST BP LT 130 MM HG: CPT | Performed by: NURSE PRACTITIONER

## 2024-07-31 PROCEDURE — 3078F DIAST BP <80 MM HG: CPT | Performed by: NURSE PRACTITIONER

## 2024-07-31 PROCEDURE — 87086 URINE CULTURE/COLONY COUNT: CPT | Performed by: NURSE PRACTITIONER

## 2024-07-31 PROCEDURE — 1125F AMNT PAIN NOTED PAIN PRSNT: CPT | Performed by: NURSE PRACTITIONER

## 2024-07-31 PROCEDURE — 1159F MED LIST DOCD IN RCRD: CPT | Performed by: NURSE PRACTITIONER

## 2024-07-31 NOTE — PROGRESS NOTES
Chief Complaint  Seward on Arm (Left) and Urinary Tract Infection    SUBJECTIVE  Lindsey Mckinnon presents to [facility]    Patient complaining of rica on left arm, near her armpit.  Patient states she feels hard, lumpy.  Patient states rica is tender, she also has pain in her armpit with swollen glands.    Patient complaining of urinary urgency, urinary frequency, dysuria.  Patient denies hematuria.    Pt with history of sleep apnea and was prescribed machine, but does not use any type of machine.   History of Present Illness  Past Medical History:   Diagnosis Date    Acute ST elevation myocardial infarction (STEMI) involving right coronary artery 08/25/2022    Alcoholism     Anemia     Anxiety     Arthritis     Asthma     Bipolar disorder     Breast lump     Cervical spine pain     Chronic allergic rhinitis     Chronic pain disorder     COPD (chronic obstructive pulmonary disease) 04/15/2021    Coronary artery disease     Deep vein thrombosis     Degenerative disc disease, cervical     Depression     Esophageal reflux     Essential hypertension 04/15/2021    Fibromyalgia     Forgetfulness     Gall stones     GERD (gastroesophageal reflux disease)     H/O emphysema     Head injury     Heart attack 08/2022    Hemorrhoid     Hernia cerebri     Hernia, hiatal     Herniated disc, cervical     Hyperlipidemia 04/15/2021    Kidney stone     Limb swelling     Lumbar pain     Lumbosacral disc disease     Migraine     Mood disorder     Muscle cramps     Nicotine dependence 04/15/2021    Peripheral neuropathy     Reflux esophagitis     Shortness of breath     Spinal stenosis     STEMI (ST elevation myocardial infarction) 08/25/2022    Thoracic disc disorder     Tremor 04/15/2021      Family History   Problem Relation Age of Onset    Depression Mother     Bipolar disorder Mother     Lung cancer Mother         MALIGNANT    Diabetes Mother         UNSPECIFIED TYPE    Cancer Mother          BLADDER    Osteoporosis Mother     Arthritis Mother     Lung cancer Father         MALIGNANT    Diabetes Father         UNSPECIFIED TYPE/ MELLITUS    Heart attack Father     Kidney cancer Father     Cancer Father         BLADDER    Arthritis Father     Heart disease Father     OCD Brother     Depression Brother     Diabetes Brother         UNSPECIFIED TYPE/MELLITUS    Kidney cancer Brother     Arthritis Brother     Paranoid behavior Brother     OCD Brother     Arthritis Son       Past Surgical History:   Procedure Laterality Date    CARDIAC CATHETERIZATION N/A 2022    Procedure: Left Heart Cath;  Surgeon: Adam Helms MD;  Location: [facility] CATH INVASIVE LOCATION;  Service: Cardiovascular;  Laterality: N/A;    CAROTID STENT      CERVICAL EPIDURAL      STERIOD INJECTIONS      SECTION      X 2     SECTION  ,    CHOLECYSTECTOMY      ENDOSCOPY      EPIDURAL BLOCK      GALLBLADDER SURGERY      HYSTERECTOMY      HYSTERECTOMY      LUMBAR EPIDURAL INJECTION      STERIOD    TRIGGER POINT INJECTION      VASCULAR SURGERY          Current Outpatient Medications:     albuterol (ACCUNEB) 0.63 MG/3ML nebulizer solution, Take 3 mL by nebulization Every 6 (Six) Hours As Needed for Wheezing., Disp: 3 mL, Rfl: 12    aspirin 81 MG chewable tablet, CHEW AND SWALLOW 1 TABLET BY MOUTH DAILY, Disp: 99 tablet, Rfl: 0    clopidogrel (PLAVIX) 75 MG tablet, Take 1 tablet by mouth Daily., Disp: 90 tablet, Rfl: 3    Evolocumab (REPATHA) solution auto-injector SureClick injection, Inject 1 mL under the skin into the appropriate area as directed Every 14 (Fourteen) Days., Disp: 6 mL, Rfl: 3    loratadine (CLARITIN) 10 MG tablet, Take 1 tablet by mouth Daily., Disp: 90 tablet, Rfl: 1    losartan (COZAAR) 50 MG tablet, Take 1 tablet by mouth Daily., Disp: 90 tablet, Rfl: 1    metaxalone (SKELAXIN) 800 MG tablet, Take 1 tablet by mouth 3 (Three) Times a Day As Needed., Disp: , Rfl:     oxyCODONE ER  "(oxyCONTIN) 15 MG tablet extended-release 12 hour, Take 1 tablet by mouth Every 12 (Twelve) Hours., Disp: , Rfl:     RABEprazole (ACIPHEX) 20 MG EC tablet, Take 1 tablet by mouth Daily., Disp: , Rfl:     sertraline (ZOLOFT) 100 MG tablet, Take 1.5 tablets by mouth Every Night., Disp: 135 tablet, Rfl: 1    tiotropium (Spiriva HandiHaler) 18 MCG per inhalation capsule, Place 1 capsule into inhaler and inhale Daily., Disp: 90 capsule, Rfl: 1    OBJECTIVE  Vital Signs:   /75 (BP Location: Left arm, Patient Position: Sitting, Cuff Size: Large Adult)   Pulse 110   Temp 96.4 °F (35.8 °C) (Temporal)   Ht 165.1 cm (65\")   Wt 103 kg (227 lb 9.6 oz)   SpO2 95%   BMI 37.87 kg/m²    Estimated body mass index is 37.87 kg/m² as calculated from the following:    Height as of this encounter: 165.1 cm (65\").    Weight as of this encounter: 103 kg (227 lb 9.6 oz).     Wt Readings from Last 3 Encounters:   07/31/24 103 kg (227 lb 9.6 oz)   07/25/24 100 kg (221 lb)   07/02/24 100 kg (221 lb)     BP Readings from Last 3 Encounters:   07/31/24 125/75   05/24/24 120/69   04/12/24 151/91       Physical Exam  Vitals reviewed.   Constitutional:       Appearance: Normal appearance. She is well-developed.   HENT:      Head: Normocephalic and atraumatic.      Right Ear: External ear normal.      Left Ear: External ear normal.      Mouth/Throat:      Pharynx: No oropharyngeal exudate.   Eyes:      Conjunctiva/sclera: Conjunctivae normal.      Pupils: Pupils are equal, round, and reactive to light.   Cardiovascular:      Rate and Rhythm: Normal rate and regular rhythm.      Pulses: Normal pulses.      Heart sounds: Normal heart sounds. No murmur heard.     No friction rub. No gallop.   Pulmonary:      Effort: Pulmonary effort is normal.      Breath sounds: Normal breath sounds. No wheezing or rhonchi.   Musculoskeletal:        Arms:    Skin:     General: Skin is warm and dry.   Neurological:      Mental Status: She is alert and " oriented to person, place, and time.      Cranial Nerves: No cranial nerve deficit.   Psychiatric:         Mood and Affect: Mood and affect normal.         Behavior: Behavior normal.         Thought Content: Thought content normal.         Judgment: Judgment normal.          Result Review          POCT urinalysis dipstick, automated [[id number]] (Order [id number])  Order  Date: 7/31/2024 Department: [facility] Ordering/Authorizing: Mady Mcnally APRN     In Basket Actions    Done  Result Mgmt     View in In Basket  Reprint Order Requisition    POCT urinalysis dipstick, automated (Order #[id number]) on 7/31/24         Contains abnormal data POCT urinalysis dipstick, automated  Order: [id number]  Status: Final result       Visible to patient: No (scheduled for 7/31/2024  1:13 PM)       Next appt: 08/05/2024 at 10:00 AM in Orthopedic Surgery ([facility] Par[facility])       Dx: Urinary frequency    Specimen Information: Urine   0 Result Notes            Component  Ref Range & Units 12:12  (7/31/24) 4 mo ago  (3/4/24) 5 mo ago  (2/19/24) 5 mo ago  (2/19/24) 5 mo ago  (2/19/24) 6 mo ago  (1/24/24) 9 mo ago  (10/10/23)   Color  Yellow, Straw, Dark Yellow, Bernadine Yellow Yellow    Yellow R Dark Yellow Abnormal  R   Clarity, UA  Clear Clear Clear     Clear   Specific Gravity  1.005 - 1.030 1.025 1.020    1.020 R 1.026   pH, Urine  5.0 - 8.0 6.0 7.0    5 R 6.0   Leukocytes  Negative Trace Abnormal  Trace Abnormal     500 Karley/ul Abnormal  Trace Abnormal    Nitrite, UA  Negative Negative Negative    Negative Negative   Protein, POC  Negative mg/dL Negative Negative     Negative R   Glucose, UA  Negative mg/dL Negative Negative    Normal R Negative R   Ketones, UA  Negative 15 mg/dL Abnormal  Negative     Negative   Urobilinogen, UA  Normal, 0.2 E.U./dL Normal Normal    1 mg/dL Abnormal  R 0.2 E.U./dL R   Bilirubin  Negative Negative Negative     Negative   Blood, UA  Negative Moderate  Abnormal  Trace Abnormal     250 Deuce/uL Abnormal  Negative   Lot Number [id number]    Expiration Date 2/28/25 4/30/24 12              The above data has been reviewed by SOFIA Grullon 07/31/2024 10:58 EDT.          Patient Care Team:  Mady Mcnally APRN as PCP - General (Family Medicine)  Arminda Arteaga APRN as Nurse Practitioner (Pain Medicine)  Adam Helms MD as Consulting Physician (Cardiology)  Franco Landa PA-C as Physician Assistant (Physician Assistant)  Carlo Drew Jr., MD as Consulting Physician (Pain Medicine)  Sergio Wilkins PA as Physician Assistant (Orthopedic Surgery)  Jose Beckwith MD as Consulting Physician (Orthopedic Surgery)  Adam Helms MD as Consulting Physician (Cardiology)            ASSESSMENT & PLAN    Diagnoses and all orders for this visit:    1. Tobacco use (Primary)  Overview:  Pt cont to smoke and not ready to quit      2. Urinary frequency  -     POCT urinalysis dipstick, automated  -     Urine Culture - Urine, Urine, Clean Catch; Future  -     Urinalysis With Culture If Indicated - Urine, Clean Catch; Future    3. Screening mammogram for breast cancer  -     Mammo Screening Digital Tomosynthesis Bilateral With CAD; Future    4. Bilateral hip pain  Comments:  Continue follow-up with orthopedic provider    5. Current mild episode of major depressive disorder, unspecified whether recurrent  Comments:  Well-controlled on current dose of Zoloft, continue medication    6. Anxiety  Comments:  Well-controlled on current dose of Zoloft, continue medication    7. Pulmonary emphysema, unspecified emphysema type  Comments:  Continue medication and follow-up with pulmonology provider    8. Other fatigue  -     CBC & Differential; Future  -     Comprehensive Metabolic Panel; Future  -     TSH; Future  -     T4, Free; Future  -     Vitamin B12; Future  -     Iron Profile; Future  -     Vitamin  D,25-Hydroxy; Future         Tobacco Use: High Risk (7/31/2024)    Patient History     Smoking Tobacco Use: Every Day     Smokeless Tobacco Use: Never     Passive Exposure: Never       Follow Up     Return if symptoms worsen or fail to improve.      Patient was given instructions and counseling regarding her condition or for health maintenance advice. Please see specific information pulled into the AVS if appropriate.   I have reviewed information obtained and documented by others and I have confirmed the accuracy of this documented note.    Mady Mcnally, APRN

## 2024-08-02 ENCOUNTER — TELEPHONE (OUTPATIENT)
Dept: FAMILY MEDICINE CLINIC | Facility: CLINIC | Age: 59
End: 2024-08-02
Payer: COMMERCIAL

## 2024-08-02 LAB — BACTERIA SPEC AEROBE CULT: NO GROWTH

## 2024-08-02 NOTE — TELEPHONE ENCOUNTER
Pt called to check the results of the urine culture from 07/31/24. Pt states she is still having pain and is uncomfortable. Advised pt that Mady is not in the office but her culture results came back negative.

## 2024-08-05 ENCOUNTER — TELEPHONE (OUTPATIENT)
Dept: FAMILY MEDICINE CLINIC | Facility: CLINIC | Age: 59
End: 2024-08-05
Payer: COMMERCIAL

## 2024-08-05 ENCOUNTER — LAB (OUTPATIENT)
Dept: LAB | Facility: HOSPITAL | Age: 59
End: 2024-08-05
Payer: COMMERCIAL

## 2024-08-05 ENCOUNTER — OFFICE VISIT (OUTPATIENT)
Dept: ORTHOPEDIC SURGERY | Facility: CLINIC | Age: 59
End: 2024-08-05
Payer: COMMERCIAL

## 2024-08-05 VITALS — OXYGEN SATURATION: 91 % | HEART RATE: 99 BPM | DIASTOLIC BLOOD PRESSURE: 85 MMHG | SYSTOLIC BLOOD PRESSURE: 125 MMHG

## 2024-08-05 DIAGNOSIS — E78.2 MIXED HYPERLIPIDEMIA: ICD-10-CM

## 2024-08-05 DIAGNOSIS — M25.551 BILATERAL HIP PAIN: Primary | ICD-10-CM

## 2024-08-05 DIAGNOSIS — M25.552 BILATERAL HIP PAIN: Primary | ICD-10-CM

## 2024-08-05 DIAGNOSIS — R53.83 OTHER FATIGUE: ICD-10-CM

## 2024-08-05 DIAGNOSIS — R31.9 HEMATURIA, UNSPECIFIED TYPE: Primary | ICD-10-CM

## 2024-08-05 DIAGNOSIS — M76.899 TENDINITIS OF HIP, UNSPECIFIED LATERALITY: ICD-10-CM

## 2024-08-05 LAB
25(OH)D3 SERPL-MCNC: 23.6 NG/ML (ref 30–100)
ALBUMIN SERPL-MCNC: 3.9 G/DL (ref 3.5–5.2)
ALBUMIN/GLOB SERPL: 1.2 G/DL
ALP SERPL-CCNC: 77 U/L (ref 39–117)
ALT SERPL W P-5'-P-CCNC: 14 U/L (ref 1–33)
ANION GAP SERPL CALCULATED.3IONS-SCNC: 10.9 MMOL/L (ref 5–15)
AST SERPL-CCNC: 17 U/L (ref 1–32)
BASOPHILS # BLD AUTO: 0.07 10*3/MM3 (ref 0–0.2)
BASOPHILS NFR BLD AUTO: 0.7 % (ref 0–1.5)
BILIRUB SERPL-MCNC: 0.2 MG/DL (ref 0–1.2)
BUN SERPL-MCNC: 9 MG/DL (ref 6–20)
BUN/CREAT SERPL: 10.5 (ref 7–25)
CALCIUM SPEC-SCNC: 9.3 MG/DL (ref 8.6–10.5)
CHLORIDE SERPL-SCNC: 103 MMOL/L (ref 98–107)
CO2 SERPL-SCNC: 24.1 MMOL/L (ref 22–29)
CREAT SERPL-MCNC: 0.86 MG/DL (ref 0.57–1)
DEPRECATED RDW RBC AUTO: 50.1 FL (ref 37–54)
EGFRCR SERPLBLD CKD-EPI 2021: 77.9 ML/MIN/1.73
EOSINOPHIL # BLD AUTO: 0.17 10*3/MM3 (ref 0–0.4)
EOSINOPHIL NFR BLD AUTO: 1.6 % (ref 0.3–6.2)
ERYTHROCYTE [DISTWIDTH] IN BLOOD BY AUTOMATED COUNT: 14 % (ref 12.3–15.4)
GLOBULIN UR ELPH-MCNC: 3.2 GM/DL
GLUCOSE SERPL-MCNC: 83 MG/DL (ref 65–99)
HCT VFR BLD AUTO: 45.5 % (ref 34–46.6)
HGB BLD-MCNC: 14.5 G/DL (ref 12–15.9)
IMM GRANULOCYTES # BLD AUTO: 0.06 10*3/MM3 (ref 0–0.05)
IMM GRANULOCYTES NFR BLD AUTO: 0.6 % (ref 0–0.5)
IRON 24H UR-MRATE: 88 MCG/DL (ref 37–145)
IRON SATN MFR SERPL: 26 % (ref 20–50)
LYMPHOCYTES # BLD AUTO: 2.82 10*3/MM3 (ref 0.7–3.1)
LYMPHOCYTES NFR BLD AUTO: 26.5 % (ref 19.6–45.3)
MCH RBC QN AUTO: 31 PG (ref 26.6–33)
MCHC RBC AUTO-ENTMCNC: 31.9 G/DL (ref 31.5–35.7)
MCV RBC AUTO: 97.2 FL (ref 79–97)
MONOCYTES # BLD AUTO: 0.75 10*3/MM3 (ref 0.1–0.9)
MONOCYTES NFR BLD AUTO: 7 % (ref 5–12)
NEUTROPHILS NFR BLD AUTO: 6.78 10*3/MM3 (ref 1.7–7)
NEUTROPHILS NFR BLD AUTO: 63.6 % (ref 42.7–76)
NRBC BLD AUTO-RTO: 0 /100 WBC (ref 0–0.2)
PLATELET # BLD AUTO: 315 10*3/MM3 (ref 140–450)
PMV BLD AUTO: 10.6 FL (ref 6–12)
POTASSIUM SERPL-SCNC: 4 MMOL/L (ref 3.5–5.2)
PROT SERPL-MCNC: 7.1 G/DL (ref 6–8.5)
RBC # BLD AUTO: 4.68 10*6/MM3 (ref 3.77–5.28)
SODIUM SERPL-SCNC: 138 MMOL/L (ref 136–145)
T4 FREE SERPL-MCNC: 1.08 NG/DL (ref 0.92–1.68)
TIBC SERPL-MCNC: 338 MCG/DL (ref 298–536)
TRANSFERRIN SERPL-MCNC: 227 MG/DL (ref 200–360)
TSH SERPL DL<=0.05 MIU/L-ACNC: 3.26 UIU/ML (ref 0.27–4.2)
VIT B12 BLD-MCNC: 587 PG/ML (ref 211–946)
WBC NRBC COR # BLD AUTO: 10.65 10*3/MM3 (ref 3.4–10.8)

## 2024-08-05 PROCEDURE — 1160F RVW MEDS BY RX/DR IN RCRD: CPT | Performed by: PHYSICIAN ASSISTANT

## 2024-08-05 PROCEDURE — 3079F DIAST BP 80-89 MM HG: CPT | Performed by: PHYSICIAN ASSISTANT

## 2024-08-05 PROCEDURE — 1159F MED LIST DOCD IN RCRD: CPT | Performed by: PHYSICIAN ASSISTANT

## 2024-08-05 PROCEDURE — 80053 COMPREHEN METABOLIC PANEL: CPT

## 2024-08-05 PROCEDURE — 85025 COMPLETE CBC W/AUTO DIFF WBC: CPT

## 2024-08-05 PROCEDURE — 36415 COLL VENOUS BLD VENIPUNCTURE: CPT

## 2024-08-05 PROCEDURE — 82306 VITAMIN D 25 HYDROXY: CPT

## 2024-08-05 PROCEDURE — 82607 VITAMIN B-12: CPT

## 2024-08-05 PROCEDURE — 84466 ASSAY OF TRANSFERRIN: CPT

## 2024-08-05 PROCEDURE — 84443 ASSAY THYROID STIM HORMONE: CPT

## 2024-08-05 PROCEDURE — 99213 OFFICE O/P EST LOW 20 MIN: CPT | Performed by: PHYSICIAN ASSISTANT

## 2024-08-05 PROCEDURE — 3074F SYST BP LT 130 MM HG: CPT | Performed by: PHYSICIAN ASSISTANT

## 2024-08-05 PROCEDURE — 84439 ASSAY OF FREE THYROXINE: CPT

## 2024-08-05 PROCEDURE — 83540 ASSAY OF IRON: CPT

## 2024-08-05 RX ORDER — MELOXICAM 7.5 MG/1
15 TABLET ORAL DAILY
Qty: 180 TABLET | Refills: 0 | Status: SHIPPED | OUTPATIENT
Start: 2024-08-05 | End: 2024-11-03

## 2024-08-05 NOTE — TELEPHONE ENCOUNTER
Spoke with patient.  She has established care with A Cothern as PCP.  Patient was notified she cannot have multiple PCPs.  Patient to keep f/u appt with new PCP and to contact her office for any future medical needs.

## 2024-08-05 NOTE — PROGRESS NOTES
Chief Complaint  Follow-up of the Right Hip and Follow-up of the Left Hip    Subjective          History of Present Illness      Lindsey Mckinnon is a 59 y.o. female  presents to [facility] for     Patient presents for follow-up evaluation of bilateral hip pain and to review bilateral hip MRIs.  She received bursitis injections from Dr. Beckwith at last visit on 7-24 and she states they helped initially but have worn off.  She points to her posterior hip in the buttock and back region as her areas of pain she also paints to her lateral hip and into her groin region as her areas of pain.  She takes OxyContin and Skelaxin for pain through pain management.  She has had her pain worsening over the last year and a half.  She ambulates with a cane.  She states that she is unable to do most activities of daily living due to her pain.  She states that she has had back specialist to treat her which is why she is in pain management.  She states she has also had therapy for her back but never for her hips.      Allergies   Allergen Reactions    Morphine Itching    Sulfa Antibiotics Nausea And Vomiting        Social History     Socioeconomic History    Marital status:    Tobacco Use    Smoking status: Every Day     Current packs/day: 0.50     Average packs/day: 0.5 packs/day for 15.0 years (7.5 ttl pk-yrs)     Types: Cigarettes     Passive exposure: Never    Smokeless tobacco: Never    Tobacco comments:     STARTED AT AGE 15   Vaping Use    Vaping status: Never Used   Substance and Sexual Activity    Alcohol use: Not Currently    Drug use: Never     Types: Marijuana    Sexual activity: Not Currently     Birth control/protection: Tubal ligation        REVIEW OF SYSTEMS    Constitutional: Awake alert and oriented x3, no acute distress, denies fevers, chills, weight loss  Respiratory: No respiratory distress  Vascular: Brisk cap refill, Intact distal pulses, No cyanosis, compartments  soft with no signs or symptoms of compartment syndrome or DVT.   Cardiovascular: Denies chest pain, shortness of breath  Skin: Denies rashes, acute skin changes  Neurologic: Denies headache, loss of consciousness  MSK: Bilateral hip pain      Objective   Vital Signs:   /85   Pulse 99   SpO2 91%     There is no height or weight on file to calculate BMI.    Physical Exam       Bilateral lower extremity: Hip Flexion 80. Internal rotation 20. External rotation 20. No skin discoloration, atrophy or swelling. Positive EHL, FHL, GS, and TA. Sensation intact to all 5 nerves of the foot. Positive straight leg raise. Leg lengths appear equal. Ambulates with antalgic gait.          Procedures    Imaging Results (Most Recent)       None           MRI Hip Right Without Contrast    Result Date: 7/26/2024  Narrative: MRI HIP RIGHT WO CONTRAST, MRI HIP LEFT WO CONTRAST Date of Exam: 7/25/2024 1:06 PM EDT Indication: right hip pain.  Comparison: 5/10/2024 radiographs Technique:  Routine multiplanar/multisequence images of the bilateral hips were obtained without contrast administration.  Findings: There is no evidence of fracture or avascular necrosis. There is normal pelvic bone alignment. There is mild bilateral SI joint and pubic symphysis degenerative change. No suspicious osseous lesion. Partially visualized lumbar spondylosis. The bilateral hips demonstrate no evidence of acetabular labral tear, significant weightbearing articular cartilage loss, or hip joint effusion. There is mild bilateral distal gluteal tendinopathy without evidence of tear or significant bursitis. The bilateral iliopsoas and adductor myotendinous structures are intact. On the right, there is proximal hamstrings tendinopathy with low-grade partial-thickness tearing at its ischial tuberosity origin. This is best seen on series 301 image 30 and series 601 image 21. On the left, there is proximal hamstrings tendinopathy with high-grade  partial-thickness tearing at its ischial tuberosity origin, best seen on series 301 image 29 and series 901 images 20 and 21. There is no evidence of retracted tear. Included intrapelvic structures show no acute abnormality. Hysterectomy.     Impression: Impression: Bilateral tendinopathy and partial-thickness tearing of the proximal hamstrings tendons, greater on the left. No evidence of retracted tear. Mild bilateral distal gluteal tendinopathy. No evidence of fracture or other acute abnormality. No evidence of acetabular labral tear or significant weightbearing articular cartilage loss of the bilateral hips. Electronically Signed: Yan Mijares MD  7/26/2024 1:46 PM EDT  Workstation ID: [id number]    MRI Hip Left Without Contrast    Result Date: 7/26/2024  Narrative: MRI HIP RIGHT WO CONTRAST, MRI HIP LEFT WO CONTRAST Date of Exam: 7/25/2024 1:06 PM EDT Indication: right hip pain.  Comparison: 5/10/2024 radiographs Technique:  Routine multiplanar/multisequence images of the bilateral hips were obtained without contrast administration.  Findings: There is no evidence of fracture or avascular necrosis. There is normal pelvic bone alignment. There is mild bilateral SI joint and pubic symphysis degenerative change. No suspicious osseous lesion. Partially visualized lumbar spondylosis. The bilateral hips demonstrate no evidence of acetabular labral tear, significant weightbearing articular cartilage loss, or hip joint effusion. There is mild bilateral distal gluteal tendinopathy without evidence of tear or significant bursitis. The bilateral iliopsoas and adductor myotendinous structures are intact. On the right, there is proximal hamstrings tendinopathy with low-grade partial-thickness tearing at its ischial tuberosity origin. This is best seen on series 301 image 30 and series 601 image 21. On the left, there is proximal hamstrings tendinopathy with high-grade partial-thickness tearing at its ischial tuberosity origin,  best seen on series 301 image 29 and series 901 images 20 and 21. There is no evidence of retracted tear. Included intrapelvic structures show no acute abnormality. Hysterectomy.     Impression: Impression: Bilateral tendinopathy and partial-thickness tearing of the proximal hamstrings tendons, greater on the left. No evidence of retracted tear. Mild bilateral distal gluteal tendinopathy. No evidence of fracture or other acute abnormality. No evidence of acetabular labral tear or significant weightbearing articular cartilage loss of the bilateral hips. Electronically Signed: Yan Mijares MD  7/26/2024 1:46 PM EDT  Workstation ID: [id number]     Result Review :   The following data was reviewed by: BULL Parada on 08/05/2024:  Data reviewed : Radiologic studies reviewed by me with the patient              Assessment and Plan    Diagnoses and all orders for this visit:    1. Bilateral hip pain (Primary)  -     Ambulatory Referral to Physical Therapy    2. Tendinitis of hip, unspecified laterality  -     Ambulatory Referral to Physical Therapy    Other orders  -     meloxicam (Mobic) 7.5 MG tablet; Take 2 tablets by mouth Daily for 90 days.  Dispense: 180 tablet; Refill: 0        Reviewed MRIs with the patient discussed diagnosis and treatment options with her she was advised we recommend physical therapy for bilateral hips she was also given a prescription of meloxicam and an order for physical therapy which she agreed to do follow-up in 6 to 8 weeks for recheck.    Call or return if worsening symptoms.    Follow Up   Return for Follow up 6-8 weeks.  Patient was given instructions and counseling regarding her condition or for health maintenance advice. Please see specific information pulled into the AVS if appropriate.       EMR Dragon/Transcription disclaimer:  Part of this note may be an electronic transcription/translation of spoken language to printed text using the Dragon Dictation System

## 2024-08-06 ENCOUNTER — TELEPHONE (OUTPATIENT)
Dept: FAMILY MEDICINE CLINIC | Facility: CLINIC | Age: 59
End: 2024-08-06
Payer: COMMERCIAL

## 2024-09-10 ENCOUNTER — OFFICE VISIT (OUTPATIENT)
Dept: FAMILY MEDICINE CLINIC | Facility: CLINIC | Age: 59
End: 2024-09-10
Payer: COMMERCIAL

## 2024-09-10 VITALS
WEIGHT: 227 LBS | TEMPERATURE: 97.9 F | OXYGEN SATURATION: 95 % | BODY MASS INDEX: 37.82 KG/M2 | SYSTOLIC BLOOD PRESSURE: 153 MMHG | HEIGHT: 65 IN | DIASTOLIC BLOOD PRESSURE: 94 MMHG | HEART RATE: 113 BPM

## 2024-09-10 DIAGNOSIS — J44.9 COPD WITHOUT EXACERBATION: ICD-10-CM

## 2024-09-10 DIAGNOSIS — I10 ESSENTIAL HYPERTENSION: ICD-10-CM

## 2024-09-10 DIAGNOSIS — R91.1 LUNG NODULE SEEN ON IMAGING STUDY: ICD-10-CM

## 2024-09-10 DIAGNOSIS — Z72.0 TOBACCO USE: ICD-10-CM

## 2024-09-10 DIAGNOSIS — F41.9 ANXIETY: ICD-10-CM

## 2024-09-10 DIAGNOSIS — E55.9 VITAMIN D DEFICIENCY: ICD-10-CM

## 2024-09-10 DIAGNOSIS — F32.0 CURRENT MILD EPISODE OF MAJOR DEPRESSIVE DISORDER WITHOUT PRIOR EPISODE: ICD-10-CM

## 2024-09-10 DIAGNOSIS — Z00.00 ANNUAL PHYSICAL EXAM: Primary | ICD-10-CM

## 2024-09-10 PROCEDURE — 3077F SYST BP >= 140 MM HG: CPT | Performed by: NURSE PRACTITIONER

## 2024-09-10 PROCEDURE — 1160F RVW MEDS BY RX/DR IN RCRD: CPT | Performed by: NURSE PRACTITIONER

## 2024-09-10 PROCEDURE — 1159F MED LIST DOCD IN RCRD: CPT | Performed by: NURSE PRACTITIONER

## 2024-09-10 PROCEDURE — 3080F DIAST BP >= 90 MM HG: CPT | Performed by: NURSE PRACTITIONER

## 2024-09-10 PROCEDURE — 1125F AMNT PAIN NOTED PAIN PRSNT: CPT | Performed by: NURSE PRACTITIONER

## 2024-09-10 PROCEDURE — 99396 PREV VISIT EST AGE 40-64: CPT | Performed by: NURSE PRACTITIONER

## 2024-09-10 RX ORDER — SERTRALINE HYDROCHLORIDE 100 MG/1
150 TABLET, FILM COATED ORAL NIGHTLY
Qty: 135 TABLET | Refills: 1 | Status: SHIPPED | OUTPATIENT
Start: 2024-09-10

## 2024-09-10 RX ORDER — ERGOCALCIFEROL 1.25 MG/1
50000 CAPSULE, LIQUID FILLED ORAL WEEKLY
Qty: 12 CAPSULE | Refills: 1 | Status: SHIPPED | OUTPATIENT
Start: 2024-09-10

## 2024-09-10 RX ORDER — LOSARTAN POTASSIUM 50 MG/1
50 TABLET ORAL DAILY
Qty: 90 TABLET | Refills: 1 | Status: SHIPPED | OUTPATIENT
Start: 2024-09-10

## 2024-09-10 RX ORDER — FLUTICASONE FUROATE, UMECLIDINIUM BROMIDE AND VILANTEROL TRIFENATATE 100; 62.5; 25 UG/1; UG/1; UG/1
1 POWDER RESPIRATORY (INHALATION)
Qty: 28 EACH | Refills: 5 | Status: SHIPPED | OUTPATIENT
Start: 2024-09-10

## 2024-09-10 NOTE — PROGRESS NOTES
Chief Complaint    Annual physical exam  Follow-up (6 months), Hypertension, Coronary Artery Disease, Hyperlipidemia, Heartburn, and COPD    SUBJECTIVE  Lindsey Mckinnon presents to [facility]    Annual physical exam  Hypertension:  Patient is taking Losartan.  Patient stopped taking Plavix..  Patient's Blood Pressure in clinic today is 153/94.  Patient does not monitor blood pressure at home.  Patient denies chest pain, shortness of air, headache, flushing, abnormal swelling in feet/ankles.    Hyperlipidemia:  Patient is taking Repatha.  Patient denies nocturnal leg cramps, myalgias.  Patient attempts to maintain a diet low in fat and carbohydrates.    Gastroesophageal Reflux:  Patient is taking Aciphex, with good control of symptoms.  Patient does not need over the counter medications for breakthrough symptoms.  Patient tries to avoid trigger foods, eat frequent small meals, not lie down within 2 hours of eating, avoids NSAIDS medications and alcohol.    COPD:  Patient is taking Claritin, Spiriva.  Patient requesting alternative to Spiriva because it isn't working anymore.  Patient was previously on Trelegy Ellipta and states that worked well for her.  She was also on Breo and Symbicort previously.    History of Present Illness  Past Medical History:   Diagnosis Date    Acute ST elevation myocardial infarction (STEMI) involving right coronary artery 08/25/2022    Alcoholism     Anemia     Anxiety     Arthritis     Asthma     Bipolar disorder     Breast lump     Cervical spine pain     Chronic allergic rhinitis     Chronic pain disorder     COPD (chronic obstructive pulmonary disease) 04/15/2021    Coronary artery disease     Deep vein thrombosis     Degenerative disc disease, cervical     Depression     Esophageal reflux     Essential hypertension 04/15/2021    Fibromyalgia     Forgetfulness     Gall stones     GERD (gastroesophageal reflux disease)     H/O emphysema      Head injury     Heart attack 2022    Hemorrhoid     Hernia cerebri     Hernia, hiatal     Herniated disc, cervical     Hyperlipidemia 04/15/2021    Kidney stone     Limb swelling     Lumbar pain     Lumbosacral disc disease     Migraine     Mood disorder     Muscle cramps     Nicotine dependence 04/15/2021    Peripheral neuropathy     Reflux esophagitis     Shortness of breath     Spinal stenosis     STEMI (ST elevation myocardial infarction) 2022    Thoracic disc disorder     Tremor 04/15/2021      Family History   Problem Relation Age of Onset    Depression Mother     Bipolar disorder Mother     Lung cancer Mother         MALIGNANT    Diabetes Mother         UNSPECIFIED TYPE    Cancer Mother         BLADDER    Osteoporosis Mother     Arthritis Mother     Lung cancer Father         MALIGNANT    Diabetes Father         UNSPECIFIED TYPE/ MELLITUS    Heart attack Father     Kidney cancer Father     Cancer Father         BLADDER    Arthritis Father     Heart disease Father     OCD Brother     Depression Brother     Diabetes Brother         UNSPECIFIED TYPE/MELLITUS    Kidney cancer Brother     Arthritis Brother     Paranoid behavior Brother     OCD Brother     Arthritis Son       Past Surgical History:   Procedure Laterality Date    CARDIAC CATHETERIZATION N/A 2022    Procedure: Left Heart Cath;  Surgeon: Adam Helms MD;  Location: [facility] within [facility] CATH INVASIVE LOCATION;  Service: Cardiovascular;  Laterality: N/A;    CAROTID STENT      CERVICAL EPIDURAL      STERIOD INJECTIONS      SECTION      X 2     SECTION  ,    CHOLECYSTECTOMY      ENDOSCOPY      EPIDURAL BLOCK      GALLBLADDER SURGERY      HYSTERECTOMY      HYSTERECTOMY      LUMBAR EPIDURAL INJECTION      STERIOD    TRIGGER POINT INJECTION      VASCULAR SURGERY          Current Outpatient Medications:     albuterol (ACCUNEB) 0.63 MG/3ML nebulizer solution, Take 3 mL by nebulization Every 6 (Six) Hours As Needed for  "Wheezing., Disp: 3 mL, Rfl: 12    Evolocumab (REPATHA) solution auto-injector SureClick injection, Inject 1 mL under the skin into the appropriate area as directed Every 14 (Fourteen) Days., Disp: 6 mL, Rfl: 3    loratadine (CLARITIN) 10 MG tablet, Take 1 tablet by mouth Daily., Disp: 90 tablet, Rfl: 1    losartan (COZAAR) 50 MG tablet, Take 1 tablet by mouth Daily., Disp: 90 tablet, Rfl: 1    meloxicam (Mobic) 7.5 MG tablet, Take 2 tablets by mouth Daily for 90 days., Disp: 180 tablet, Rfl: 0    metaxalone (SKELAXIN) 800 MG tablet, Take 1 tablet by mouth 3 (Three) Times a Day As Needed., Disp: , Rfl:     oxyCODONE ER (oxyCONTIN) 15 MG tablet extended-release 12 hour, Take 1 tablet by mouth Every 12 (Twelve) Hours., Disp: , Rfl:     RABEprazole (ACIPHEX) 20 MG EC tablet, Take 1 tablet by mouth Daily., Disp: , Rfl:     sertraline (ZOLOFT) 100 MG tablet, Take 1.5 tablets by mouth Every Night., Disp: 135 tablet, Rfl: 1    tiotropium (Spiriva HandiHaler) 18 MCG per inhalation capsule, Place 1 capsule into inhaler and inhale Daily., Disp: 90 capsule, Rfl: 1    Fluticasone-Umeclidin-Vilant (Trelegy Ellipta) 100-62.5-25 MCG/ACT inhaler, Inhale 1 puff Daily., Disp: 28 each, Rfl: 5    vitamin D (ERGOCALCIFEROL) 1.25 MG (13087 UT) capsule capsule, Take 1 capsule by mouth 1 (One) Time Per Week., Disp: 12 capsule, Rfl: 1    OBJECTIVE  Vital Signs:   /94 (BP Location: Left arm, Patient Position: Sitting, Cuff Size: Large Adult)   Pulse 113   Temp 97.9 °F (36.6 °C) (Temporal)   Ht 165.1 cm (65\")   Wt 103 kg (227 lb)   SpO2 95%   BMI 37.77 kg/m²    Estimated body mass index is 37.77 kg/m² as calculated from the following:    Height as of this encounter: 165.1 cm (65\").    Weight as of this encounter: 103 kg (227 lb).     Wt Readings from Last 3 Encounters:   09/10/24 103 kg (227 lb)   07/31/24 103 kg (227 lb 9.6 oz)   07/25/24 100 kg (221 lb)     BP Readings from Last 3 Encounters:   09/10/24 153/94   08/05/24 125/85 "   07/31/24 125/75       Physical Exam  Vitals reviewed.   Constitutional:       Appearance: Normal appearance. She is well-developed.   HENT:      Head: Normocephalic and atraumatic.      Right Ear: External ear normal.      Left Ear: External ear normal.      Mouth/Throat:      Pharynx: No oropharyngeal exudate.   Eyes:      Conjunctiva/sclera: Conjunctivae normal.      Pupils: Pupils are equal, round, and reactive to light.   Neck:      Vascular: No carotid bruit.   Cardiovascular:      Rate and Rhythm: Normal rate and regular rhythm.      Pulses: Normal pulses.      Heart sounds: Normal heart sounds. No murmur heard.     No friction rub. No gallop.   Pulmonary:      Effort: Pulmonary effort is normal.      Breath sounds: Normal breath sounds. No wheezing or rhonchi.   Skin:     General: Skin is warm and dry.   Neurological:      Mental Status: She is alert and oriented to person, place, and time.      Cranial Nerves: No cranial nerve deficit.   Psychiatric:         Mood and Affect: Mood and affect normal.         Behavior: Behavior normal.         Thought Content: Thought content normal.         Judgment: Judgment normal.          Result Review    CMP          2/20/2024    06:43 3/4/2024    12:09 8/5/2024    09:31   CMP   Glucose 119  79  83    BUN 8  11  9    Creatinine 0.68  0.66  0.86    EGFR 101.1  101.8  77.9    Sodium 140  136  138    Potassium 4.0  4.5  4.0    Chloride 105  103  103    Calcium 9.5  9.4  9.3    Total Protein 6.9  6.8  7.1    Albumin 3.8  3.9  3.9    Globulin 3.1  2.9  3.2    Total Bilirubin 0.3  0.2  0.2    Alkaline Phosphatase 81  69  77    AST (SGOT) 14  20  17    ALT (SGPT) 22  20  14    Albumin/Globulin Ratio 1.2  1.3  1.2    BUN/Creatinine Ratio 11.8  16.7  10.5    Anion Gap 9.9  10.6  10.9      CBC          2/20/2024    06:43 3/4/2024    12:09 8/5/2024    09:31   CBC   WBC 13.37  10.72  10.65    RBC 4.71  4.50  4.68    Hemoglobin 14.5  14.1  14.5    Hematocrit 44.6  41.8  45.5    MCV  94.7  92.9  97.2    MCH 30.8  31.3  31.0    MCHC 32.5  33.7  31.9    RDW 14.6  13.5  14.0    Platelets 293  314  315      TSH          3/4/2024    12:09 8/5/2024    09:31   TSH   TSH 1.720  3.260      Lab Results   Component Value Date    VUMH98ZM 23.6 (L) 08/05/2024     Lab Results   Component Value Date    FREET4 1.08 08/05/2024               The above data has been reviewed by SOFIA Grullon 09/10/2024 12:32 EDT.          Patient Care Team:  Mady Mcnally APRN as PCP - General (Family Medicine)  Arminda Arteaga APRN as Nurse Practitioner (Pain Medicine)  Adam Helms MD as Consulting Physician (Cardiology)  Franco Landa PA-C as Physician Assistant (Physician Assistant)  Carlo Drew Jr., MD as Consulting Physician (Pain Medicine)  Sergio Wilkins PA as Physician Assistant (Orthopedic Surgery)  Jose Beckwith MD as Consulting Physician (Orthopedic Surgery)  Adam Helms MD as Consulting Physician (Cardiology)            ASSESSMENT & PLAN    Diagnoses and all orders for this visit:    1. Annual physical exam (Primary)    2. Essential hypertension  Comments:  BP well controlled, continue current medication  Orders:  -     losartan (COZAAR) 50 MG tablet; Take 1 tablet by mouth Daily.  Dispense: 90 tablet; Refill: 1    3. Anxiety  Comments:  Well-controlled with Zoloft, continue current medication  Orders:  -     sertraline (ZOLOFT) 100 MG tablet; Take 1.5 tablets by mouth Every Night.  Dispense: 135 tablet; Refill: 1    4. Current mild episode of major depressive disorder without prior episode  Comments:  Well-controlled with Zoloft, continue current medication  Orders:  -     sertraline (ZOLOFT) 100 MG tablet; Take 1.5 tablets by mouth Every Night.  Dispense: 135 tablet; Refill: 1    5. Lung nodule seen on imaging study  -     CT Chest With Contrast; Future    6. Tobacco use  Comments:  Encourage smoking cessation  Overview:  Pt cont to smoke and not ready to  quit      7. Vitamin D deficiency  Comments:  Start vitamin D weekly, side effects and administration addressed  Orders:  -     vitamin D (ERGOCALCIFEROL) 1.25 MG (06654 UT) capsule capsule; Take 1 capsule by mouth 1 (One) Time Per Week.  Dispense: 12 capsule; Refill: 1    8. COPD without exacerbation  Comments:  Continue Spiriva and add Trelegy ellipta, side effects and administration addressed.  Orders:  -     Fluticasone-Umeclidin-Vilant (Trelegy Ellipta) 100-62.5-25 MCG/ACT inhaler; Inhale 1 puff Daily.  Dispense: 28 each; Refill: 5       Lindsey Mckinnon  reports that she has been smoking cigarettes. She has a 7.5 pack-year smoking history. She has never been exposed to tobacco smoke. She has never used smokeless tobacco. I have educated her on the risk of diseases from using tobacco products such as cancer, COPD, and heart disease.     I advised her to quit and she is not willing to quit.    I spent 10 minutes counseling the patient.       The patient is advised to quit smoking, attempt to lose weight, continue current medications, continue current healthy lifestyle patterns, and return for routine annual checkups.    Tobacco Use: High Risk (9/10/2024)    Patient History     Smoking Tobacco Use: Every Day     Smokeless Tobacco Use: Never     Passive Exposure: Never       Follow Up     Return in about 4 months (around 1/10/2025).      Patient was given instructions and counseling regarding her condition or for health maintenance advice. Please see specific information pulled into the AVS if appropriate.   I have reviewed information obtained and documented by others and I have confirmed the accuracy of this documented note.    SOFIA Grullon

## 2024-09-19 ENCOUNTER — OFFICE VISIT (OUTPATIENT)
Dept: UROLOGY | Facility: CLINIC | Age: 59
End: 2024-09-19
Payer: COMMERCIAL

## 2024-09-19 VITALS
RESPIRATION RATE: 12 BRPM | BODY MASS INDEX: 38.12 KG/M2 | SYSTOLIC BLOOD PRESSURE: 147 MMHG | HEIGHT: 65 IN | WEIGHT: 228.8 LBS | DIASTOLIC BLOOD PRESSURE: 97 MMHG

## 2024-09-19 DIAGNOSIS — R31.29 MICROHEMATURIA: Primary | ICD-10-CM

## 2024-09-19 DIAGNOSIS — R82.90 URINE MALODOR: ICD-10-CM

## 2024-09-19 DIAGNOSIS — R35.0 URINARY FREQUENCY: ICD-10-CM

## 2024-09-19 DIAGNOSIS — N95.8 GENITOURINARY SYNDROME OF MENOPAUSE: ICD-10-CM

## 2024-09-19 PROBLEM — G25.2 CONTINUOUS TREMOR: Status: RESOLVED | Noted: 2020-12-30 | Resolved: 2024-09-19

## 2024-09-19 PROBLEM — K64.9 HEMORRHOID: Status: RESOLVED | Noted: 2021-08-15 | Resolved: 2024-09-19

## 2024-09-19 PROBLEM — Z72.0 TOBACCO USE: Status: RESOLVED | Noted: 2020-08-05 | Resolved: 2024-09-19

## 2024-09-19 PROBLEM — M47.817 LUMBOSACRAL SPONDYLOSIS WITHOUT MYELOPATHY: Status: RESOLVED | Noted: 2022-07-21 | Resolved: 2024-09-19

## 2024-09-19 PROBLEM — E66.01 CLASS 2 SEVERE OBESITY DUE TO EXCESS CALORIES WITH SERIOUS COMORBIDITY AND BODY MASS INDEX (BMI) OF 37.0 TO 37.9 IN ADULT: Chronic | Status: RESOLVED | Noted: 2022-02-11 | Resolved: 2024-09-19

## 2024-09-19 PROBLEM — K21.9 ESOPHAGEAL REFLUX: Status: RESOLVED | Noted: 2021-08-15 | Resolved: 2024-09-19

## 2024-09-19 PROBLEM — E66.812 CLASS 2 SEVERE OBESITY DUE TO EXCESS CALORIES WITH SERIOUS COMORBIDITY AND BODY MASS INDEX (BMI) OF 37.0 TO 37.9 IN ADULT: Chronic | Status: RESOLVED | Noted: 2022-02-11 | Resolved: 2024-09-19

## 2024-09-19 PROBLEM — G43.909 MIGRAINE: Status: RESOLVED | Noted: 2021-08-15 | Resolved: 2024-09-19

## 2024-09-19 PROBLEM — Z51.81 MEDICATION MONITORING ENCOUNTER: Status: RESOLVED | Noted: 2019-05-10 | Resolved: 2024-09-19

## 2024-09-19 PROBLEM — K80.20 GALL STONES: Status: RESOLVED | Noted: 2021-08-15 | Resolved: 2024-09-19

## 2024-09-19 PROBLEM — Z79.891 LONG-TERM CURRENT USE OF OPIATE ANALGESIC: Status: RESOLVED | Noted: 2019-06-11 | Resolved: 2024-09-19

## 2024-09-19 PROBLEM — F17.200 NICOTINE DEPENDENCE WITH CURRENT USE: Status: RESOLVED | Noted: 2021-04-15 | Resolved: 2024-09-19

## 2024-09-19 PROBLEM — M79.7 FIBROMYALGIA: Status: RESOLVED | Noted: 2020-03-14 | Resolved: 2024-09-19

## 2024-09-19 LAB
BACTERIA UR QL AUTO: ABNORMAL /HPF
BILIRUB BLD-MCNC: ABNORMAL MG/DL
CLARITY, POC: CLEAR
COLOR UR: YELLOW
EXPIRATION DATE: ABNORMAL
GLUCOSE UR STRIP-MCNC: NEGATIVE MG/DL
HYALINE CASTS UR QL AUTO: ABNORMAL /LPF
KETONES UR QL: ABNORMAL
LEUKOCYTE EST, POC: ABNORMAL
Lab: ABNORMAL
NITRITE UR-MCNC: NEGATIVE MG/ML
PH UR: 6.5 [PH] (ref 5–8)
PROT UR STRIP-MCNC: NEGATIVE MG/DL
RBC # UR STRIP: ABNORMAL /HPF
RBC # UR STRIP: ABNORMAL /UL
REF LAB TEST METHOD: ABNORMAL
SP GR UR: 1.02 (ref 1–1.03)
SQUAMOUS #/AREA URNS HPF: ABNORMAL /HPF
UROBILINOGEN UR QL: NORMAL
WBC # UR STRIP: ABNORMAL /HPF

## 2024-09-19 PROCEDURE — 81015 MICROSCOPIC EXAM OF URINE: CPT | Performed by: NURSE PRACTITIONER

## 2024-09-19 RX ORDER — ESTRADIOL 0.1 MG/G
CREAM VAGINAL
Qty: 42.5 G | Refills: 6 | Status: SHIPPED | OUTPATIENT
Start: 2024-09-19

## 2024-09-20 ENCOUNTER — TELEPHONE (OUTPATIENT)
Dept: ORTHOPEDIC SURGERY | Facility: CLINIC | Age: 59
End: 2024-09-20
Payer: COMMERCIAL

## 2024-09-23 RX ORDER — AMPICILLIN TRIHYDRATE 500 MG
500 CAPSULE ORAL 4 TIMES DAILY
Qty: 40 CAPSULE | Refills: 0 | Status: SHIPPED | OUTPATIENT
Start: 2024-09-23 | End: 2024-10-03

## 2024-09-26 ENCOUNTER — HOSPITAL ENCOUNTER (OUTPATIENT)
Dept: CT IMAGING | Facility: HOSPITAL | Age: 59
Discharge: HOME | End: 2024-09-26
Admitting: NURSE PRACTITIONER
Payer: COMMERCIAL

## 2024-09-26 DIAGNOSIS — R91.1 LUNG NODULE SEEN ON IMAGING STUDY: ICD-10-CM

## 2024-09-26 PROCEDURE — 71260 CT THORAX DX C+: CPT

## 2024-09-26 PROCEDURE — 25510000001 IOPAMIDOL PER 1 ML: Performed by: NURSE PRACTITIONER

## 2024-09-26 RX ORDER — IOPAMIDOL 755 MG/ML
100 INJECTION, SOLUTION INTRAVASCULAR
Status: COMPLETED | OUTPATIENT
Start: 2024-09-26 | End: 2024-09-26

## 2024-09-26 RX ADMIN — IOPAMIDOL 100 ML: 755 INJECTION, SOLUTION INTRAVENOUS at 13:13

## 2024-09-30 DIAGNOSIS — J43.9 PULMONARY EMPHYSEMA, UNSPECIFIED EMPHYSEMA TYPE: Primary | ICD-10-CM

## 2024-10-10 ENCOUNTER — OFFICE VISIT (OUTPATIENT)
Dept: ORTHOPEDIC SURGERY | Facility: CLINIC | Age: 59
End: 2024-10-10
Payer: COMMERCIAL

## 2024-10-10 VITALS — HEIGHT: 65 IN | OXYGEN SATURATION: 93 % | WEIGHT: 228 LBS | BODY MASS INDEX: 37.99 KG/M2 | HEART RATE: 102 BPM

## 2024-10-10 DIAGNOSIS — M76.899 TENDINITIS OF HIP, UNSPECIFIED LATERALITY: ICD-10-CM

## 2024-10-10 DIAGNOSIS — M25.551 BILATERAL HIP PAIN: Primary | ICD-10-CM

## 2024-10-10 DIAGNOSIS — M25.552 BILATERAL HIP PAIN: Primary | ICD-10-CM

## 2024-10-10 DIAGNOSIS — M54.50 BILATERAL LOW BACK PAIN, UNSPECIFIED CHRONICITY, UNSPECIFIED WHETHER SCIATICA PRESENT: ICD-10-CM

## 2024-10-10 RX ADMIN — LIDOCAINE HYDROCHLORIDE 5 ML: 10 INJECTION, SOLUTION INFILTRATION; PERINEURAL at 14:17

## 2024-10-10 RX ADMIN — TRIAMCINOLONE ACETONIDE 40 MG: 40 INJECTION, SUSPENSION INTRA-ARTICULAR; INTRAMUSCULAR at 14:17

## 2024-10-10 RX ADMIN — TRIAMCINOLONE ACETONIDE 40 MG: 40 INJECTION, SUSPENSION INTRA-ARTICULAR; INTRAMUSCULAR at 14:16

## 2024-10-10 RX ADMIN — LIDOCAINE HYDROCHLORIDE 5 ML: 10 INJECTION, SOLUTION INFILTRATION; PERINEURAL at 14:16

## 2024-10-10 NOTE — PROGRESS NOTES
"Chief Complaint  Follow-up and Pain of the Right Hip and Follow-up and Pain of the Left Hip     Subjective      Lindsey Mckinnon presents to [facility] for follow up of bilateral hips.  She has had injections for bursitis in the past.  She had an injection on 7/2/24.  She notes she can't stand very long and has a limping gait.  She has pain in the groin.  She has some pain on the lateral aspect of the hip and pain in the gluteal.  She had a MRI and is here to review.  The right hip is worse then the left. She has had low back pain for years and just started having pain in the right hip for 1-2 years.     Allergies   Allergen Reactions    Morphine Itching    Sulfa Antibiotics Nausea And Vomiting        Social History     Socioeconomic History    Marital status:    Tobacco Use    Smoking status: Every Day     Current packs/day: 0.50     Average packs/day: 0.5 packs/day for 15.0 years (7.5 ttl pk-yrs)     Types: Cigarettes     Passive exposure: Never    Smokeless tobacco: Never    Tobacco comments:     STARTED AT AGE 15   Vaping Use    Vaping status: Never Used   Substance and Sexual Activity    Alcohol use: Not Currently    Drug use: Never     Types: Marijuana    Sexual activity: Not Currently     Birth control/protection: Tubal ligation        I reviewed the patient's chief complaint, history of present illness, review of systems, past medical history, surgical history, family history, social history, medications, and allergy list.     Review of Systems     Constitutional: Denies fevers, chills, weight loss  Cardiovascular: Denies chest pain, shortness of breath  Skin: Denies rashes, acute skin changes  Neurologic: Denies headache, loss of consciousness        Vital Signs:   Pulse 102   Ht 165.1 cm (65\")   Wt 103 kg (228 lb)   SpO2 93%   BMI 37.94 kg/m²            Ortho Exam    Physical Exam  General:Alert. No acute distress     Bilateral lower extremity: Hip Flexion " 80. Internal rotation 20. External rotation 20. No skin discoloration, atrophy or swelling. Positive EHL, FHL, GS, and TA. Sensation intact to all 5 nerves of the foot. Positive straight leg raise. Leg lengths appear equal. Ambulates with antalgic gait.     Large Joint Arthrocentesis  Date/Time: 10/10/2024 2:16 PM  Consent given by: patient  Site marked: site marked  Timeout: Immediately prior to procedure a time out was called to verify the correct patient, procedure, equipment, support staff and site/side marked as required   Supporting Documentation  Indications: pain   Procedure Details  Location: hip (right) -   Needle size: 22 G  Medications administered: 5 mL lidocaine 1 %; 40 mg triamcinolone acetonide 40 MG/ML  Patient tolerance: patient tolerated the procedure well with no immediate complications      Large Joint Arthrocentesis  Date/Time: 10/10/2024 2:17 PM  Consent given by: patient  Site marked: site marked  Timeout: Immediately prior to procedure a time out was called to verify the correct patient, procedure, equipment, support staff and site/side marked as required   Supporting Documentation  Indications: pain   Procedure Details  Location: hip (left) -   Needle size: 22 G  Medications administered: 5 mL lidocaine 1 %; 40 mg triamcinolone acetonide 40 MG/ML  Patient tolerance: patient tolerated the procedure well with no immediate complications    This injection documentation was Scribed for Jose Beckwith MD by Yudy Davenport MA.  10/10/24   14:18 EDT        Imaging Results (Most Recent)       None             Result Review :        Date of Exam: 7/25/2024 1:06 PM EDT     Indication: right hip pain.     Comparison: 5/10/2024 radiographs     Technique:  Routine multiplanar/multisequence images of the bilateral hips were obtained without contrast administration.          Findings:  There is no evidence of fracture or avascular necrosis. There is normal pelvic bone alignment. There is mild  bilateral SI joint and pubic symphysis degenerative change. No suspicious osseous lesion. Partially visualized lumbar spondylosis.     The bilateral hips demonstrate no evidence of acetabular labral tear, significant weightbearing articular cartilage loss, or hip joint effusion.     There is mild bilateral distal gluteal tendinopathy without evidence of tear or significant bursitis.     The bilateral iliopsoas and adductor myotendinous structures are intact.     On the right, there is proximal hamstrings tendinopathy with low-grade partial-thickness tearing at its ischial tuberosity origin. This is best seen on series 301 image 30 and series 601 image 21.     On the left, there is proximal hamstrings tendinopathy with high-grade partial-thickness tearing at its ischial tuberosity origin, best seen on series 301 image 29 and series 901 images 20 and 21. There is no evidence of retracted tear.     Included intrapelvic structures show no acute abnormality. Hysterectomy.     IMPRESSION:  Impression:  Bilateral tendinopathy and partial-thickness tearing of the proximal hamstrings tendons, greater on the left. No evidence of retracted tear.     Mild bilateral distal gluteal tendinopathy.     No evidence of fracture or other acute abnormality. No evidence of acetabular labral tear or significant weightbearing articular cartilage loss of the bilateral hips.          Date of Exam: 7/25/2024 1:06 PM EDT     Indication: right hip pain.     Comparison: 5/10/2024 radiographs     Technique:  Routine multiplanar/multisequence images of the bilateral hips were obtained without contrast administration.          Findings:  There is no evidence of fracture or avascular necrosis. There is normal pelvic bone alignment. There is mild bilateral SI joint and pubic symphysis degenerative change. No suspicious osseous lesion. Partially visualized lumbar spondylosis.     The bilateral hips demonstrate no evidence of acetabular labral tear,  significant weightbearing articular cartilage loss, or hip joint effusion.     There is mild bilateral distal gluteal tendinopathy without evidence of tear or significant bursitis.     The bilateral iliopsoas and adductor myotendinous structures are intact.     On the right, there is proximal hamstrings tendinopathy with low-grade partial-thickness tearing at its ischial tuberosity origin. This is best seen on series 301 image 30 and series 601 image 21.     On the left, there is proximal hamstrings tendinopathy with high-grade partial-thickness tearing at its ischial tuberosity origin, best seen on series 301 image 29 and series 901 images 20 and 21. There is no evidence of retracted tear.     Included intrapelvic structures show no acute abnormality. Hysterectomy.     IMPRESSION:  Impression:  Bilateral tendinopathy and partial-thickness tearing of the proximal hamstrings tendons, greater on the left. No evidence of retracted tear.     Mild bilateral distal gluteal tendinopathy.     No evidence of fracture or other acute abnormality. No evidence of acetabular labral tear or significant weightbearing articular cartilage loss of the bilateral hips.              Assessment and Plan     Diagnoses and all orders for this visit:    1. Bilateral hip pain (Primary)    2. Tendinitis of hip, unspecified laterality    3. Bilateral low back pain, unspecified chronicity, unspecified whether sciatica present        Discussed the treatment plan with the patient. I reviewed the MRI results with the patient.     Discussed the risks and benefits of conservative measures. The patient expressed understanding and wished to proceed with bilateral hip steroid injections .  She tolerated the injections well.      MRI of lumbar spine.  Then follow up with Dr Drew for MRI results.     Educated on risk of smoking/nicotine. Discussed options for smoking cessation. and Call or return if worsening symptoms.    Follow Up     3 months.         Patient was given instructions and counseling regarding her condition or for health maintenance advice. Please see specific information pulled into the AVS if appropriate.     Scribed for Jose Beckwith MD by Yazmin Tellez MA.  10/10/24   13:47 EDT    I have personally performed the services described in this document as scribed by the above individual and it is both accurate and complete. Jose Beckwith MD 10/12/24

## 2024-10-12 RX ORDER — TRIAMCINOLONE ACETONIDE 40 MG/ML
40 INJECTION, SUSPENSION INTRA-ARTICULAR; INTRAMUSCULAR
Status: COMPLETED | OUTPATIENT
Start: 2024-10-10 | End: 2024-10-10

## 2024-10-12 RX ORDER — LIDOCAINE HYDROCHLORIDE 10 MG/ML
5 INJECTION, SOLUTION INFILTRATION; PERINEURAL
Status: COMPLETED | OUTPATIENT
Start: 2024-10-10 | End: 2024-10-10

## 2024-11-19 ENCOUNTER — HOSPITAL ENCOUNTER (OUTPATIENT)
Dept: MRI IMAGING | Facility: HOSPITAL | Age: 59
Discharge: HOME | End: 2024-11-19
Admitting: ORTHOPAEDIC SURGERY
Payer: COMMERCIAL

## 2024-11-19 DIAGNOSIS — M25.552 BILATERAL HIP PAIN: ICD-10-CM

## 2024-11-19 DIAGNOSIS — M76.899 TENDINITIS OF HIP, UNSPECIFIED LATERALITY: ICD-10-CM

## 2024-11-19 DIAGNOSIS — M25.551 BILATERAL HIP PAIN: ICD-10-CM

## 2024-11-19 DIAGNOSIS — M54.50 BILATERAL LOW BACK PAIN, UNSPECIFIED CHRONICITY, UNSPECIFIED WHETHER SCIATICA PRESENT: ICD-10-CM

## 2024-11-19 PROCEDURE — 72148 MRI LUMBAR SPINE W/O DYE: CPT

## 2024-12-06 ENCOUNTER — OFFICE VISIT (OUTPATIENT)
Dept: ORTHOPEDIC SURGERY | Facility: CLINIC | Age: 59
End: 2024-12-06
Payer: COMMERCIAL

## 2024-12-06 VITALS
OXYGEN SATURATION: 95 % | BODY MASS INDEX: 37.99 KG/M2 | HEIGHT: 65 IN | SYSTOLIC BLOOD PRESSURE: 123 MMHG | WEIGHT: 228 LBS | DIASTOLIC BLOOD PRESSURE: 88 MMHG | HEART RATE: 104 BPM

## 2024-12-06 DIAGNOSIS — M54.50 BILATERAL LOW BACK PAIN, UNSPECIFIED CHRONICITY, UNSPECIFIED WHETHER SCIATICA PRESENT: Primary | ICD-10-CM

## 2024-12-06 DIAGNOSIS — M54.16 LUMBAR RADICULOPATHY: ICD-10-CM

## 2024-12-06 NOTE — PROGRESS NOTES
Chief Complaint  Follow-up of the Left Hip and Follow-up of the Right Hip     Subjective      Lindsey Mckinnon presents to [facility] for a follow up for bilateral hip. She has been treating her bilateral hip tendonitis conservatively. She was last seen in the office on 10/10/24 where she received bilateral hip steroid injections. She reports these injections gave her minimal relief. She recently had an MRI on her lumbar and presents today for these results. She notes she can't stand very long and has a limping gait. She has pain in the groin. She has some pain on the lateral aspect of the hip and pain in the gluteal. The right hip is worse then the left. She has had low back pain for years and just started having pain in the right hip for 1-2 years. She is currently in pain management regarding her back where she has been getting ablations in her back.     Allergies   Allergen Reactions    Morphine Itching    Sulfa Antibiotics Nausea And Vomiting        Social History     Socioeconomic History    Marital status:    Tobacco Use    Smoking status: Every Day     Current packs/day: 0.50     Average packs/day: 0.5 packs/day for 15.0 years (7.5 ttl pk-yrs)     Types: Cigarettes     Passive exposure: Never    Smokeless tobacco: Never    Tobacco comments:     STARTED AT AGE 15   Vaping Use    Vaping status: Never Used   Substance and Sexual Activity    Alcohol use: Not Currently    Drug use: Never     Types: Marijuana    Sexual activity: Not Currently     Birth control/protection: Tubal ligation        I reviewed the patient's chief complaint, history of present illness, review of systems, past medical history, surgical history, family history, social history, medications, and allergy list.     Review of Systems     Constitutional: Denies fevers, chills, weight loss  Cardiovascular: Denies chest pain, shortness of breath  Skin: Denies rashes, acute skin changes  Neurologic:  "Denies headache, loss of consciousness  MSK: Bilateral hip pain       Vital Signs:   /88   Pulse 104   Ht 165.1 cm (65\")   Wt 103 kg (228 lb)   SpO2 95%   BMI 37.94 kg/m²            Ortho Exam    Physical Exam  General:Alert. No acute distress     Bilateral lower extremity: Hip Flexion 80. Internal rotation 20. External rotation 20. No skin discoloration, atrophy or swelling. Positive EHL, FHL, GS, and TA. Sensation intact to all 5 nerves of the foot. Positive straight leg raise. Leg lengths appear equal. Ambulates with antalgic gait.     Procedures    Imaging Results (Most Recent)       None             Result Review :       MRI Lumbar Spine Without Contrast    Result Date: 11/22/2024  Narrative: MRI LUMBAR SPINE WO CONTRAST Date of Exam: 11/19/2024 3:43 PM EST Indication: pain.  Comparison: 5/4/2023 Technique:  Routine multiplanar/multisequence sequence images of the lumbar spine were obtained without contrast administration.  Findings: The last well formed disc space is labeled as L5-S1. Distal cord and conus: Normal morphology and signal. The conus medullaris terminates at L1. Cauda equina and nerve roots: Normal morphology and signal. Alignment: The vertebral bodies appear in normal alignment.  Bones: The vertebral body heights are preserved. Type I Modic changes at L1-2 and L2-3. Type II Modic changes at L5-S1. No suspicious osseous lesions are demonstrated. Description of degenerative changes at specific levels is as follows: T12-L1: No spinal canal or neuroforaminal stenosis. L1-2: Facet arthropathy. No spinal canal or neuroforaminal stenosis. L2-3: Facet arthropathy and minimal posterior disc bulge. Mild canal stenosis. No neural foraminal stenosis. L3-4: Facet arthropathy and minimal posterior disc bulge. Mild canal stenosis. No foraminal stenosis. L4-5: Left central disc bulge and facet arthropathy. Mild canal stenosis with narrowing of the left greater than right lateral recess. Mild bilateral " neural foraminal stenosis. L5-S1: Facet arthropathy and right eccentric disc bulge. Narrowing of the right lateral recess. Severe right neural foraminal stenosis. This is similar to prior exam. Extra-vertebral soft tissues: Paraspinal atrophy. Visualized abdomen/pelvis: Renal cystic lesions.     Impression: Impression: Degenerative change of the spine most advanced at L5-S1 where there is severe right neural foraminal stenosis. This is similar to prior exam. Additional levels and details as above. Electronically Signed: Solis Ferrer MD  11/22/2024 5:22 PM EST  Workstation ID: [id number]            Assessment and Plan     Diagnoses and all orders for this visit:    1. Bilateral low back pain, unspecified chronicity, unspecified whether sciatica present (Primary)    2. Lumbar radiculopathy      The patient presents here today for a follow up for bilateral hip. MRI results were discussed and reviewed with the patient today in the office.     Referral placed today for Dr. Drew for further evaluation and treatment of her lumbar spine.     Diclofenac 50 mg sent into the pharmacy today.     Educated on risk of smoking/nicotine. Discussed options for smoking cessation. and Call or return if worsening symptoms.    Follow Up     In January for repeat injections     Patient was given instructions and counseling regarding her condition or for health maintenance advice. Please see specific information pulled into the AVS if appropriate.     Scribed for Jose Beckwith MD by Abbie Donahue.  12/06/24   13:40 EST    I have personally performed the services described in this document as scribed by the above individual and it is both accurate and complete. Jose Beckwith MD 12/08/24

## 2024-12-19 ENCOUNTER — TELEPHONE (OUTPATIENT)
Dept: UROLOGY | Age: 59
End: 2024-12-19

## 2024-12-19 NOTE — TELEPHONE ENCOUNTER
Hub staff attempted to follow warm transfer process and was unsuccessful     Caller: SYED REVELES    Relationship to patient: SELF    Best call back number: [phone] (home)      Patient is needing: RESCHEDULE APPOINTMENT FROM TODAY - SHE HAS A FEVER AND WOULD LIKE TO GET SOONER APPT THAN FEB 14TH THAT THE HUB HAS.  HER SYMPTOMS HAVE RETURNED AFTER COMPLETING MEDICATION.  PLEASE CALL PT ASAP.  THANK YOU.

## 2024-12-19 NOTE — TELEPHONE ENCOUNTER
Called patient and scheduled her in February. I explained that's all we have for now but I told her to keep calling every now and then to see if we can get her in sooner. Patient voiced her understandings.

## 2025-01-11 DIAGNOSIS — M54.50 BILATERAL LOW BACK PAIN, UNSPECIFIED CHRONICITY, UNSPECIFIED WHETHER SCIATICA PRESENT: ICD-10-CM

## 2025-01-11 DIAGNOSIS — M54.16 LUMBAR RADICULOPATHY: ICD-10-CM

## 2025-01-14 ENCOUNTER — OFFICE VISIT (OUTPATIENT)
Dept: ORTHOPEDIC SURGERY | Facility: CLINIC | Age: 60
End: 2025-01-14
Payer: COMMERCIAL

## 2025-01-14 VITALS
BODY MASS INDEX: 37.99 KG/M2 | HEART RATE: 114 BPM | WEIGHT: 228 LBS | OXYGEN SATURATION: 94 % | SYSTOLIC BLOOD PRESSURE: 137 MMHG | HEIGHT: 65 IN | DIASTOLIC BLOOD PRESSURE: 94 MMHG

## 2025-01-14 DIAGNOSIS — M25.552 BILATERAL HIP PAIN: ICD-10-CM

## 2025-01-14 DIAGNOSIS — M76.899 TENDINITIS OF HIP, UNSPECIFIED LATERALITY: Primary | ICD-10-CM

## 2025-01-14 DIAGNOSIS — M25.551 BILATERAL HIP PAIN: ICD-10-CM

## 2025-01-14 RX ORDER — LIDOCAINE HYDROCHLORIDE 10 MG/ML
5 INJECTION, SOLUTION INFILTRATION; PERINEURAL
Status: COMPLETED | OUTPATIENT
Start: 2025-01-14 | End: 2025-01-14

## 2025-01-14 RX ORDER — TRIAMCINOLONE ACETONIDE 40 MG/ML
40 INJECTION, SUSPENSION INTRA-ARTICULAR; INTRAMUSCULAR
Status: COMPLETED | OUTPATIENT
Start: 2025-01-14 | End: 2025-01-14

## 2025-01-14 RX ADMIN — LIDOCAINE HYDROCHLORIDE 5 ML: 10 INJECTION, SOLUTION INFILTRATION; PERINEURAL at 13:16

## 2025-01-14 RX ADMIN — TRIAMCINOLONE ACETONIDE 40 MG: 40 INJECTION, SUSPENSION INTRA-ARTICULAR; INTRAMUSCULAR at 13:16

## 2025-01-14 NOTE — PROGRESS NOTES
"Chief Complaint  Follow-up of the Left Hip and Follow-up of the Right Hip     Subjective      Lindsey Mckinnon presents to [facility] for a follow up for bilateral hip. She has been treating her bilateral hip tendonitis conservatively. She was last seen in the office on 10/10/24 where she had bilateral hip steroid injections. She reports these injections gave her great relief and is requesting repeat injections today in the office. She denies any new injury or falls since her last visit.     Allergies   Allergen Reactions    Morphine Itching    Sulfa Antibiotics Nausea And Vomiting        Social History     Socioeconomic History    Marital status:    Tobacco Use    Smoking status: Every Day     Current packs/day: 0.50     Average packs/day: 0.5 packs/day for 15.0 years (7.5 ttl pk-yrs)     Types: Cigarettes     Passive exposure: Never    Smokeless tobacco: Never    Tobacco comments:     STARTED AT AGE 15   Vaping Use    Vaping status: Never Used   Substance and Sexual Activity    Alcohol use: Not Currently    Drug use: Never     Types: Marijuana    Sexual activity: Not Currently     Birth control/protection: Tubal ligation        I reviewed the patient's chief complaint, history of present illness, review of systems, past medical history, surgical history, family history, social history, medications, and allergy list.     Review of Systems     Constitutional: Denies fevers, chills, weight loss  Cardiovascular: Denies chest pain, shortness of breath  Skin: Denies rashes, acute skin changes  Neurologic: Denies headache, loss of consciousness  MSK: bilateral hip pain       Vital Signs:   /94   Pulse 114   Ht 165.1 cm (65\")   Wt 103 kg (228 lb)   SpO2 94%   BMI 37.94 kg/m²            Ortho Exam    Physical Exam  General:Alert. No acute distress     Bilateral lower extremity: Hip Flexion 80. Internal rotation 20. External rotation 20. No skin discoloration, atrophy " or swelling. Positive EHL, FHL, GS, and TA. Sensation intact to all 5 nerves of the foot. Positive straight leg raise. Leg lengths appear equal. Ambulates with antalgic gait.        Large Joint: R greater trochanteric bursa  Date/Time: 1/14/2025 1:16 PM  Consent given by: patient  Site marked: site marked  Timeout: Immediately prior to procedure a time out was called to verify the correct patient, procedure, equipment, support staff and site/side marked as required   Supporting Documentation  Indications: pain   Procedure Details  Location: hip - R greater trochanteric bursa  Preparation: Patient was prepped and draped in the usual sterile fashion  Needle gauge: 21 G.  Medications administered: 5 mL lidocaine 1 %; 40 mg triamcinolone acetonide 40 MG/ML  Patient tolerance: patient tolerated the procedure well with no immediate complications      Large Joint: L greater trochanteric bursa  Date/Time: 1/14/2025 1:16 PM  Consent given by: patient  Site marked: site marked  Timeout: Immediately prior to procedure a time out was called to verify the correct patient, procedure, equipment, support staff and site/side marked as required   Supporting Documentation  Indications: pain   Procedure Details  Location: hip - L greater trochanteric bursa  Preparation: Patient was prepped and draped in the usual sterile fashion  Needle gauge: 21 G.  Medications administered: 5 mL lidocaine 1 %; 40 mg triamcinolone acetonide 40 MG/ML  Patient tolerance: patient tolerated the procedure well with no immediate complications    This injection documentation was Scribed for Jose Beckwith MD by Mandi Huerta MA.  01/14/25   13:16 EST      Imaging Results (Most Recent)       None             Result Review :       No results found.           Assessment and Plan     Diagnoses and all orders for this visit:    1. Tendinitis of hip, unspecified laterality (Primary)    2. Bilateral hip pain        The patient presents here today for a follow  up for bilateral hip.     Discussed the risks and benefits of bilateral hip steroid injections today in the office. Patient expressed understanding and wishes to proceed. She tolerated the injections well and without any complications.     Home exercises given today and will continue current medications for pain control.     Call or return if worsening symptoms.    Follow Up     3 months     Patient was given instructions and counseling regarding her condition or for health maintenance advice. Please see specific information pulled into the AVS if appropriate.     Scribed for Jose Beckwith MD by Abbie Donahue.  01/14/25   13:09 EST    I have personally performed the services described in this document as scribed by the above individual and it is both accurate and complete. Jose Beckwith MD 01/14/25

## 2025-01-29 ENCOUNTER — OFFICE VISIT (OUTPATIENT)
Dept: NEUROSURGERY | Facility: CLINIC | Age: 60
End: 2025-01-29
Payer: COMMERCIAL

## 2025-01-29 ENCOUNTER — OFFICE VISIT (OUTPATIENT)
Dept: FAMILY MEDICINE CLINIC | Facility: CLINIC | Age: 60
End: 2025-01-29
Payer: COMMERCIAL

## 2025-01-29 VITALS
SYSTOLIC BLOOD PRESSURE: 103 MMHG | HEIGHT: 65 IN | OXYGEN SATURATION: 95 % | HEART RATE: 103 BPM | WEIGHT: 223 LBS | DIASTOLIC BLOOD PRESSURE: 77 MMHG | BODY MASS INDEX: 37.15 KG/M2 | TEMPERATURE: 98.1 F

## 2025-01-29 VITALS
BODY MASS INDEX: 37.19 KG/M2 | HEIGHT: 65 IN | HEART RATE: 105 BPM | WEIGHT: 223.2 LBS | DIASTOLIC BLOOD PRESSURE: 77 MMHG | SYSTOLIC BLOOD PRESSURE: 121 MMHG

## 2025-01-29 DIAGNOSIS — M47.816 FACET ARTHROPATHY, LUMBAR: ICD-10-CM

## 2025-01-29 DIAGNOSIS — R30.0 DYSURIA: ICD-10-CM

## 2025-01-29 DIAGNOSIS — K21.9 GASTROESOPHAGEAL REFLUX DISEASE WITHOUT ESOPHAGITIS: ICD-10-CM

## 2025-01-29 DIAGNOSIS — J30.9 ALLERGIC RHINITIS, UNSPECIFIED SEASONALITY, UNSPECIFIED TRIGGER: ICD-10-CM

## 2025-01-29 DIAGNOSIS — M51.369 DEGENERATION OF INTERVERTEBRAL DISC OF LUMBAR REGION, UNSPECIFIED WHETHER PAIN PRESENT: Primary | ICD-10-CM

## 2025-01-29 DIAGNOSIS — J43.9 PULMONARY EMPHYSEMA, UNSPECIFIED EMPHYSEMA TYPE: ICD-10-CM

## 2025-01-29 DIAGNOSIS — I10 ESSENTIAL HYPERTENSION: Primary | ICD-10-CM

## 2025-01-29 LAB
BILIRUB BLD-MCNC: ABNORMAL MG/DL
CLARITY, POC: CLEAR
COLOR UR: ABNORMAL
EXPIRATION DATE: ABNORMAL
GLUCOSE UR STRIP-MCNC: NEGATIVE MG/DL
KETONES UR QL: ABNORMAL
LEUKOCYTE EST, POC: ABNORMAL
Lab: ABNORMAL
NITRITE UR-MCNC: NEGATIVE MG/ML
PH UR: 6 [PH] (ref 5–8)
PROT UR STRIP-MCNC: ABNORMAL MG/DL
RBC # UR STRIP: ABNORMAL /UL
SP GR UR: 1.02 (ref 1–1.03)
UROBILINOGEN UR QL: NORMAL

## 2025-01-29 PROCEDURE — 87086 URINE CULTURE/COLONY COUNT: CPT | Performed by: NURSE PRACTITIONER

## 2025-01-29 PROCEDURE — 99214 OFFICE O/P EST MOD 30 MIN: CPT | Performed by: NURSE PRACTITIONER

## 2025-01-29 PROCEDURE — 3074F SYST BP LT 130 MM HG: CPT | Performed by: NURSE PRACTITIONER

## 2025-01-29 PROCEDURE — 1160F RVW MEDS BY RX/DR IN RCRD: CPT | Performed by: NURSE PRACTITIONER

## 2025-01-29 PROCEDURE — 1125F AMNT PAIN NOTED PAIN PRSNT: CPT | Performed by: NURSE PRACTITIONER

## 2025-01-29 PROCEDURE — 3078F DIAST BP <80 MM HG: CPT | Performed by: NURSE PRACTITIONER

## 2025-01-29 PROCEDURE — 1159F MED LIST DOCD IN RCRD: CPT | Performed by: NURSE PRACTITIONER

## 2025-01-29 RX ORDER — LORATADINE 10 MG/1
10 TABLET ORAL DAILY
Qty: 90 TABLET | Refills: 1 | Status: SHIPPED | OUTPATIENT
Start: 2025-01-29

## 2025-01-29 RX ORDER — LOSARTAN POTASSIUM 50 MG/1
50 TABLET ORAL DAILY
Qty: 90 TABLET | Refills: 1 | Status: CANCELLED | OUTPATIENT
Start: 2025-01-29

## 2025-01-29 RX ORDER — ALBUTEROL SULFATE 0.63 MG/3ML
1 SOLUTION RESPIRATORY (INHALATION) EVERY 6 HOURS PRN
Qty: 3 ML | Refills: 12 | Status: SHIPPED | OUTPATIENT
Start: 2025-01-29

## 2025-01-29 RX ORDER — RABEPRAZOLE SODIUM 20 MG/1
20 TABLET, DELAYED RELEASE ORAL DAILY
Qty: 90 TABLET | Refills: 1 | Status: SHIPPED | OUTPATIENT
Start: 2025-01-29

## 2025-01-29 RX ORDER — LOSARTAN POTASSIUM 100 MG/1
100 TABLET ORAL DAILY
Qty: 90 TABLET | Refills: 1 | Status: SHIPPED | OUTPATIENT
Start: 2025-01-29

## 2025-01-29 NOTE — PROGRESS NOTES
"Patient being seen for today for Follow-up (F/U EXT_BILATERAL LOW BACK PAIN, UNSPECIFIED CHRONICITY, UNSEPECIFIED WHETHER SCIATICA PRESENT, LUMBAR RADICULOPATHY_MRI [facility] 11-19-24@ [facility]) and Back Pain (Patient describes the pain in the low back and wraps around to the groin. She stated it hurts all the time and when she walks or sits down to long. )  .    Subjective    Lindsey Mckinnon is a 59 y.o. female that presents with Follow-up (F/U EXT_BILATERAL LOW BACK PAIN, UNSPECIFIED CHRONICITY, UNSEPECIFIED WHETHER SCIATICA PRESENT, LUMBAR RADICULOPATHY_MRI [facility] 11-19-24@ [facility]) and Back Pain (Patient describes the pain in the low back and wraps around to the groin. She stated it hurts all the time and when she walks or sits down to long. )  .    HPI  Previously: Last seen on 4/18/2024 by Dr. Drew for complaints of worsening lower back pain.  She denied sciatica.  She did have some right leg numbness.  She had some pain radiating to the right groin.  It was MRI at that time showing multilevel degenerative disc disease and facet arthritis with some foraminal narrowing at L5-S1.  At that time Dr. Drew discussed that she would not likely benefit from lumbar surgery.  There was discussion of facet injections or ablation, particularly on the right, to help her pain.  There was an order for right hip x-ray.  The x-ray showed bilateral hip arthritis and she was referred to orthopedics.    Today: She reports pain in the right lower back extending to the right groin. She reports this has worsened over the past 1 year or so. Pain is rated 5/10 today. The pain is constant and waxing and waning, described as \"I wouldn't call it dull, I wouldn't call it aching\", and \"like if I took a rubber band and stretched it as far as it's about to tear, rip, break.\" The pain is worse at no specific time of day.    She reports \"a lot of pain\" and weakness in the right leg calf. She denies pain radiating through the leg " typically, however (although feels pain through the leg at times).    Hip injections with Ortho were ineffective for this pain. Nothing else seems to help the pain.    The pain is worse with sitting or walking, prolonged standing.     reports that she has been smoking cigarettes. She has a 7.5 pack-year smoking history. She has never been exposed to tobacco smoke. She has never used smokeless tobacco.    Review of Systems   Musculoskeletal:  Positive for back pain (right groin pain, right leg pain in the calf (not usually through the leg, however)).   Neurological:  Positive for weakness and numbness.       Objective   Vitals:    01/29/25 1556   BP: 121/77   Pulse: 105        Physical Exam  Constitutional:       Appearance: Normal appearance. She is obese.   Pulmonary:      Effort: Pulmonary effort is normal.   Musculoskeletal:         General: Tenderness (midline and right > left lumbar area) present.      Comments: SLR on the right worsens pain in the right lower back   Neurological:      General: No focal deficit present.      Mental Status: She is alert and oriented to person, place, and time.      Sensory: No sensory deficit.      Motor: Weakness (left ankle flexion and extension) present.      Deep Tendon Reflexes: Reflexes normal.   Psychiatric:         Mood and Affect: Mood normal.         Behavior: Behavior normal.          Result Review   I have personally interpreted the MRI of the lumbar spine without contrast from 11/19/2024 which shows multilevel degenerative disc disease and facet arthritis.  There are Modic type changes at L1-L2, L2-L3 and L5-S1.  There is severe right foraminal narrowing at L5-S1, otherwise there is no significant stenosis.     Assessment and Plan {CC Problem List  Visit Diagnosis  ROS  Review (Popup)  Health Maintenance  Quality  BestPractice  Medications  SmartSets  SnapShot Encounters  Media :23}   Diagnoses and all orders for this visit:    1. Degeneration of  intervertebral disc of lumbar region, unspecified whether pain present (Primary)    2. Facet arthropathy, lumbar    Her pain is primarily in the lower back, but wraps around to the groin.    She has severe right foraminal stenosis at L5-S1, but otherwise has no significant stenosis.    I do not expect a surgical approach to be likely to offer benefit for her current lower back and groin pain complaints, but if she develops right leg pain in an L5 pattern (to the middle toes), then surgery may be helpful in addressing leg pain.    She may consider PT targeting the lumbar spine.    She may consider pain management consultation to discuss possible LESI vs MBBs/RFA, although RFA treatment is more likely to help axial low back pain. She reports RFA treatment on the right was helpful for some pain, but not this pain. She is doing MBBs on the left side.    She is likely a candidate for Intracept procedure with Modic changes in the lumbar spine.    The patient was counseled on basic recommendations for the reduction and prevention of back, neck, or spine pain in association with spinal disorders, including: cessation/avoidance of nicotine use, maintenance of a healthy BMI and weight, focusing on building/maintaining core strength through core exercise, and avoidance of activities which worsen the pain. The patient will monitor for changes in symptoms and notify our clinic of these changes as needed.  Follow Up {Instructions Charge Capture  Follow-up Communications :23}   Return if symptoms worsen or fail to improve.

## 2025-01-29 NOTE — PROGRESS NOTES
Chief Complaint  Hypertension, Hyperlipidemia, Heartburn, COPD, and Dysuria    SUBJECTIVE  Lindsey Mckinnon presents to [facility]    Hypertension:  Patient is taking Losartan.  Patient's Blood Pressure in clinic today is 103/77.  Patient does monitor blood pressure at home with readings of 160s-190s/70s-140s.  Patient denies chest pain, shortness of air, flushing, abnormal swelling in feet/ankles.  Patient states she has been having headaches due to elevated blood pressure.  Patient reports she has been taking an increased dose of 100mg of Losartan so she has run out of her prescription early.    Hyperlipidemia:  Patient is diet controlled.  Patient denies nocturnal leg cramps, myalgias.  Patient attempts to maintain a diet low in fat and carbohydrates.    Gastroesophageal Reflux:  Patient is taking Aciphex, with good control of symptoms.  Patient does not need over the counter medications for breakthrough symptoms.  Patient tries to avoid trigger foods, eat frequent small meals, not lie down within 2 hours of eating, avoids NSAIDS medications and alcohol.    COPD:  Patient is taking Albuterol Neb, Trelegy Ellipta.    HRT:  Patient is taking Estradiol.    Anxiety/Depression:  Patient is taking Zoloft, with good control of symptoms.  Patient denies suicidal ideations or thoughts of harming herself or others.              History of Present Illness  Past Medical History:   Diagnosis Date    Acute ST elevation myocardial infarction (STEMI) involving right coronary artery 08/25/2022    Alcoholism     Anemia     Anxiety     Arthritis     Asthma     Bipolar disorder     Breast lump     Cervical spine pain     Chronic allergic rhinitis     Chronic pain disorder     COPD (chronic obstructive pulmonary disease) 04/15/2021    Coronary artery disease     Deep vein thrombosis     Degenerative disc disease, cervical     Depression     Esophageal reflux     Essential hypertension  04/15/2021    Fibromyalgia     Forgetfulness     Gall stones     GERD (gastroesophageal reflux disease)     H/O emphysema     Head injury     Heart attack 2022    Hemorrhoid     Hernia cerebri     Hernia, hiatal     Herniated disc, cervical     Hyperlipidemia 04/15/2021    Kidney stone     Limb swelling     Lumbar pain     Lumbosacral disc disease     Migraine     Mood disorder     Muscle cramps     Nicotine dependence 04/15/2021    Peripheral neuropathy     Reflux esophagitis     Shortness of breath     Spinal stenosis     STEMI (ST elevation myocardial infarction) 2022    Thoracic disc disorder     Tremor 04/15/2021      Family History   Problem Relation Age of Onset    Depression Mother     Bipolar disorder Mother     Lung cancer Mother         MALIGNANT    Diabetes Mother         UNSPECIFIED TYPE    Cancer Mother         BLADDER    Osteoporosis Mother     Arthritis Mother     Lung cancer Father         MALIGNANT    Diabetes Father         UNSPECIFIED TYPE/ MELLITUS    Heart attack Father     Kidney cancer Father     Cancer Father         BLADDER    Arthritis Father     Heart disease Father     OCD Brother     Depression Brother     Diabetes Brother         UNSPECIFIED TYPE/MELLITUS    Kidney cancer Brother     Arthritis Brother     Paranoid behavior Brother     OCD Brother     Arthritis Son       Past Surgical History:   Procedure Laterality Date    CARDIAC CATHETERIZATION N/A 2022    Procedure: Left Heart Cath;  Surgeon: Adam Helms MD;  Location: [facility] CATH INVASIVE LOCATION;  Service: Cardiovascular;  Laterality: N/A;    CAROTID STENT      CERVICAL EPIDURAL      STERIOD INJECTIONS      SECTION      X 2     SECTION  ,    CHOLECYSTECTOMY      ENDOSCOPY      EPIDURAL BLOCK      GALLBLADDER SURGERY      HYSTERECTOMY      HYSTERECTOMY      LUMBAR EPIDURAL INJECTION      STERIOD    TRIGGER POINT INJECTION      VASCULAR SURGERY          Current Outpatient  "Medications:     albuterol (ACCUNEB) 0.63 MG/3ML nebulizer solution, Take 3 mL by nebulization Every 6 (Six) Hours As Needed for Wheezing., Disp: 3 mL, Rfl: 12    diclofenac (VOLTAREN) 50 MG EC tablet, TAKE 1 TABLET BY MOUTH TWICE DAILY, Disp: 60 tablet, Rfl: 1    estradiol (ESTRACE) 0.1 MG/GM vaginal cream, Apply 0.5gm  inside the vagina and rub in and 0.5gm around the vestibule and massage and as well as around urethra.  3 times a week at night, Disp: 42.5 g, Rfl: 6    Fluticasone-Umeclidin-Vilant (Trelegy Ellipta) 100-62.5-25 MCG/ACT inhaler, Inhale 1 puff Daily., Disp: 28 each, Rfl: 5    loratadine (CLARITIN) 10 MG tablet, Take 1 tablet by mouth Daily., Disp: 90 tablet, Rfl: 1    metaxalone (SKELAXIN) 800 MG tablet, Take 1 tablet by mouth 3 (Three) Times a Day As Needed., Disp: , Rfl:     oxyCODONE ER (oxyCONTIN) 15 MG tablet extended-release 12 hour, Take 1 tablet by mouth Every 12 (Twelve) Hours., Disp: , Rfl:     RABEprazole (ACIPHEX) 20 MG EC tablet, Take 1 tablet by mouth Daily., Disp: 90 tablet, Rfl: 1    sertraline (ZOLOFT) 100 MG tablet, Take 1.5 tablets by mouth Every Night., Disp: 135 tablet, Rfl: 1    vitamin D (ERGOCALCIFEROL) 1.25 MG (08940 UT) capsule capsule, Take 1 capsule by mouth 1 (One) Time Per Week., Disp: 12 capsule, Rfl: 1    losartan (Cozaar) 100 MG tablet, Take 1 tablet by mouth Daily., Disp: 90 tablet, Rfl: 1    OBJECTIVE  Vital Signs:   /77 (BP Location: Left arm, Patient Position: Sitting, Cuff Size: Large Adult)   Pulse 103   Temp 98.1 °F (36.7 °C) (Temporal)   Ht 165.1 cm (65\")   Wt 101 kg (223 lb)   SpO2 95%   BMI 37.11 kg/m²    Estimated body mass index is 37.11 kg/m² as calculated from the following:    Height as of this encounter: 165.1 cm (65\").    Weight as of this encounter: 101 kg (223 lb).     Wt Readings from Last 3 Encounters:   01/29/25 101 kg (223 lb 3.2 oz)   01/29/25 101 kg (223 lb)   01/14/25 103 kg (228 lb)     BP Readings from Last 3 Encounters: "   01/29/25 121/77   01/29/25 103/77   01/14/25 137/94       Physical Exam  Vitals reviewed.   Constitutional:       Appearance: Normal appearance. She is well-developed.   HENT:      Head: Normocephalic and atraumatic.      Right Ear: External ear normal.      Left Ear: External ear normal.      Mouth/Throat:      Pharynx: No oropharyngeal exudate.   Eyes:      Conjunctiva/sclera: Conjunctivae normal.      Pupils: Pupils are equal, round, and reactive to light.   Cardiovascular:      Rate and Rhythm: Normal rate and regular rhythm.      Pulses: Normal pulses.      Heart sounds: Normal heart sounds. No murmur heard.     No friction rub. No gallop.   Pulmonary:      Effort: Pulmonary effort is normal.      Breath sounds: Normal breath sounds. No wheezing or rhonchi.   Skin:     General: Skin is warm and dry.   Neurological:      Mental Status: She is alert and oriented to person, place, and time.      Cranial Nerves: No cranial nerve deficit.   Psychiatric:         Mood and Affect: Mood and affect normal.         Behavior: Behavior normal.         Thought Content: Thought content normal.         Judgment: Judgment normal.          Result Review      POCT urinalysis dipstick, automated  Order: [id number]  Status: Final result       Visible to patient: Yes (seen)       Next appt: 02/13/2025 at 08:45 AM in Urology (SOFIA Dias)       Dx: Essential hypertension    Specimen Information: Urine   0 Result Notes            Component  Ref Range & Units 1 d ago  (1/29/25) 4 mo ago  (9/19/24) 6 mo ago  (7/31/24) 11 mo ago  (3/4/24) 11 mo ago  (2/19/24) 11 mo ago  (2/19/24) 11 mo ago  (2/19/24)   Color  Yellow, Straw, Dark Yellow, Bernadine Dark Yellow Yellow Yellow Yellow      Clarity, UA  Clear Clear Clear Clear Clear      Specific Gravity  1.005 - 1.030 1.025 1.020 1.025 1.020      pH, Urine  5.0 - 8.0 6.0 6.5 6.0 7.0      Leukocytes  Negative Small (1+) Abnormal  Trace Abnormal  Trace Abnormal  Trace Abnormal        Nitrite, UA  Negative Negative Negative Negative Negative      Protein, POC  Negative mg/dL 30 mg/dL Abnormal  Negative Negative Negative      Glucose, UA  Negative mg/dL Negative Negative Negative Negative      Ketones, UA  Negative 15 mg/dL Abnormal  Trace Abnormal  15 mg/dL Abnormal  Negative      Urobilinogen, UA  Normal, 0.2 E.U./dL Normal Normal Normal Normal      Bilirubin  Negative Small (1+) Abnormal  Small (1+) Abnormal  Negative Negative      Blood, UA  Negative Small Abnormal  Small Abnormal  Moderate Abnormal  Trace Abnormal       Lot Number [id number]   Expiration Date 7/30/25 2,025/7 2/28/25 4/30/24 12,052           above data has been reviewed by SOFIA Grullon 01/29/2025 15:13 EST.          Patient Care Team:  Mady Mcnally APRN as PCP - General (Family Medicine)  Arminda Arteaga APRN as Nurse Practitioner (Pain Medicine)  Adam Helms MD as Consulting Physician (Cardiology)  Franco Landa PA-C as Physician Assistant (Physician Assistant)  Carlo Drew Jr., MD as Consulting Physician (Pain Medicine)  Sergio Wilkins PA as Physician Assistant (Orthopedic Surgery)  Jose Beckwith MD as Consulting Physician (Orthopedic Surgery)  Adam Helms MD as Consulting Physician (Cardiology)            ASSESSMENT & PLAN    Diagnoses and all orders for this visit:    1. Essential hypertension (Primary)  Comments:  BP well controlled, continue current medication  Orders:  -     POCT urinalysis dipstick, automated  -     Comprehensive Metabolic Panel; Future  -     CBC & Differential; Future  -     TSH; Future  -     Lipid Panel; Future  -     losartan (Cozaar) 100 MG tablet; Take 1 tablet by mouth Daily.  Dispense: 90 tablet; Refill: 1    2. Pulmonary emphysema, unspecified emphysema type  Comments:  Continue inhaler and albuterol nebulizer as needed  Orders:  -     albuterol (ACCUNEB) 0.63 MG/3ML nebulizer solution;  Take 3 mL by nebulization Every 6 (Six) Hours As Needed for Wheezing.  Dispense: 3 mL; Refill: 12    3. Allergic rhinitis, unspecified seasonality, unspecified trigger  Comments:  Symptoms well controlled with current medication regimen, cont. Current meds.  Orders:  -     loratadine (CLARITIN) 10 MG tablet; Take 1 tablet by mouth Daily.  Dispense: 90 tablet; Refill: 1    4. Dysuria  Comments:  Check urinalysis and follow-up with urology.  Orders:  -     Urine Culture - Urine, Urine, Clean Catch; Future  -     Urine Culture - Urine, Urine, Clean Catch    5. Gastroesophageal reflux disease without esophagitis  Comments:  Symptoms well controlled with current medication regimen, cont. Current meds.  Orders:  -     RABEprazole (ACIPHEX) 20 MG EC tablet; Take 1 tablet by mouth Daily.  Dispense: 90 tablet; Refill: 1         Tobacco Use: High Risk (1/30/2025)    Patient History     Smoking Tobacco Use: Every Day     Smokeless Tobacco Use: Never     Passive Exposure: Never       Follow Up     Return in about 6 months (around 7/29/2025).      Patient was given instructions and counseling regarding her condition or for health maintenance advice. Please see specific information pulled into the AVS if appropriate.   I have reviewed information obtained and documented by others and I have confirmed the accuracy of this documented note.    SOFIA Grullon

## 2025-01-31 ENCOUNTER — LAB (OUTPATIENT)
Dept: LAB | Facility: HOSPITAL | Age: 60
End: 2025-01-31
Payer: COMMERCIAL

## 2025-01-31 DIAGNOSIS — I10 ESSENTIAL HYPERTENSION: ICD-10-CM

## 2025-01-31 LAB
ALBUMIN SERPL-MCNC: 3.7 G/DL (ref 3.5–5.2)
ALBUMIN/GLOB SERPL: 1.1 G/DL
ALP SERPL-CCNC: 88 U/L (ref 39–117)
ALT SERPL W P-5'-P-CCNC: 14 U/L (ref 1–33)
ANION GAP SERPL CALCULATED.3IONS-SCNC: 9.1 MMOL/L (ref 5–15)
AST SERPL-CCNC: 14 U/L (ref 1–32)
BACTERIA SPEC AEROBE CULT: NORMAL
BASOPHILS # BLD AUTO: 0.08 10*3/MM3 (ref 0–0.2)
BASOPHILS NFR BLD AUTO: 0.6 % (ref 0–1.5)
BILIRUB SERPL-MCNC: <0.2 MG/DL (ref 0–1.2)
BUN SERPL-MCNC: 10 MG/DL (ref 6–20)
BUN/CREAT SERPL: 10.3 (ref 7–25)
CALCIUM SPEC-SCNC: 9.6 MG/DL (ref 8.6–10.5)
CHLORIDE SERPL-SCNC: 104 MMOL/L (ref 98–107)
CHOLEST SERPL-MCNC: 224 MG/DL (ref 0–200)
CO2 SERPL-SCNC: 23.9 MMOL/L (ref 22–29)
CREAT SERPL-MCNC: 0.97 MG/DL (ref 0.57–1)
DEPRECATED RDW RBC AUTO: 45.2 FL (ref 37–54)
EGFRCR SERPLBLD CKD-EPI 2021: 67.5 ML/MIN/1.73
EOSINOPHIL # BLD AUTO: 0.13 10*3/MM3 (ref 0–0.4)
EOSINOPHIL NFR BLD AUTO: 0.9 % (ref 0.3–6.2)
ERYTHROCYTE [DISTWIDTH] IN BLOOD BY AUTOMATED COUNT: 12.7 % (ref 12.3–15.4)
GLOBULIN UR ELPH-MCNC: 3.3 GM/DL
GLUCOSE SERPL-MCNC: 78 MG/DL (ref 65–99)
HCT VFR BLD AUTO: 46.3 % (ref 34–46.6)
HDLC SERPL-MCNC: 42 MG/DL (ref 40–60)
HGB BLD-MCNC: 14.9 G/DL (ref 12–15.9)
IMM GRANULOCYTES # BLD AUTO: 0.09 10*3/MM3 (ref 0–0.05)
IMM GRANULOCYTES NFR BLD AUTO: 0.6 % (ref 0–0.5)
LDLC SERPL CALC-MCNC: 155 MG/DL (ref 0–100)
LDLC/HDLC SERPL: 3.63 {RATIO}
LYMPHOCYTES # BLD AUTO: 3.56 10*3/MM3 (ref 0.7–3.1)
LYMPHOCYTES NFR BLD AUTO: 25.3 % (ref 19.6–45.3)
MCH RBC QN AUTO: 31 PG (ref 26.6–33)
MCHC RBC AUTO-ENTMCNC: 32.2 G/DL (ref 31.5–35.7)
MCV RBC AUTO: 96.3 FL (ref 79–97)
MONOCYTES # BLD AUTO: 1.08 10*3/MM3 (ref 0.1–0.9)
MONOCYTES NFR BLD AUTO: 7.7 % (ref 5–12)
NEUTROPHILS NFR BLD AUTO: 64.9 % (ref 42.7–76)
NEUTROPHILS NFR BLD AUTO: 9.14 10*3/MM3 (ref 1.7–7)
NRBC BLD AUTO-RTO: 0 /100 WBC (ref 0–0.2)
PLATELET # BLD AUTO: 311 10*3/MM3 (ref 140–450)
PMV BLD AUTO: 11.3 FL (ref 6–12)
POTASSIUM SERPL-SCNC: 4 MMOL/L (ref 3.5–5.2)
PROT SERPL-MCNC: 7 G/DL (ref 6–8.5)
RBC # BLD AUTO: 4.81 10*6/MM3 (ref 3.77–5.28)
SODIUM SERPL-SCNC: 137 MMOL/L (ref 136–145)
TRIGL SERPL-MCNC: 147 MG/DL (ref 0–150)
TSH SERPL DL<=0.05 MIU/L-ACNC: 5.29 UIU/ML (ref 0.27–4.2)
VLDLC SERPL-MCNC: 27 MG/DL (ref 5–40)
WBC NRBC COR # BLD AUTO: 14.08 10*3/MM3 (ref 3.4–10.8)

## 2025-01-31 PROCEDURE — 36415 COLL VENOUS BLD VENIPUNCTURE: CPT

## 2025-01-31 PROCEDURE — 85025 COMPLETE CBC W/AUTO DIFF WBC: CPT

## 2025-01-31 PROCEDURE — 80053 COMPREHEN METABOLIC PANEL: CPT

## 2025-01-31 PROCEDURE — 84443 ASSAY THYROID STIM HORMONE: CPT

## 2025-01-31 PROCEDURE — 80061 LIPID PANEL: CPT

## 2025-02-04 DIAGNOSIS — R79.89 ABNORMAL TSH: Primary | ICD-10-CM

## 2025-02-04 DIAGNOSIS — D72.829 LEUKOCYTOSIS, UNSPECIFIED TYPE: ICD-10-CM

## 2025-02-13 ENCOUNTER — OFFICE VISIT (OUTPATIENT)
Dept: UROLOGY | Age: 60
End: 2025-02-13
Payer: COMMERCIAL

## 2025-02-13 ENCOUNTER — HOSPITAL ENCOUNTER (OUTPATIENT)
Dept: CT IMAGING | Facility: HOSPITAL | Age: 60
Discharge: HOME | End: 2025-02-13
Admitting: NURSE PRACTITIONER
Payer: COMMERCIAL

## 2025-02-13 VITALS — HEIGHT: 65 IN | BODY MASS INDEX: 37.15 KG/M2 | RESPIRATION RATE: 18 BRPM | WEIGHT: 223 LBS

## 2025-02-13 DIAGNOSIS — R31.29 MICROHEMATURIA: ICD-10-CM

## 2025-02-13 DIAGNOSIS — N20.0 RENAL STONE: ICD-10-CM

## 2025-02-13 DIAGNOSIS — R31.29 MICROHEMATURIA: Primary | ICD-10-CM

## 2025-02-13 DIAGNOSIS — R35.0 URINARY FREQUENCY: ICD-10-CM

## 2025-02-13 LAB
BILIRUB BLD-MCNC: NEGATIVE MG/DL
CLARITY, POC: CLEAR
COLOR UR: YELLOW
EXPIRATION DATE: ABNORMAL
GLUCOSE UR STRIP-MCNC: NEGATIVE MG/DL
KETONES UR QL: NEGATIVE
LEUKOCYTE EST, POC: NEGATIVE
Lab: ABNORMAL
NITRITE UR-MCNC: NEGATIVE MG/ML
PH UR: 7 [PH] (ref 5–8)
PROT UR STRIP-MCNC: NEGATIVE MG/DL
RBC # UR STRIP: ABNORMAL /UL
SP GR UR: 1.02 (ref 1–1.03)
URINE VOLUME: 0
UROBILINOGEN UR QL: ABNORMAL

## 2025-02-13 PROCEDURE — 74178 CT ABD&PLV WO CNTR FLWD CNTR: CPT

## 2025-02-13 PROCEDURE — 25510000001 IOPAMIDOL PER 1 ML: Performed by: NURSE PRACTITIONER

## 2025-02-13 RX ORDER — IOPAMIDOL 755 MG/ML
100 INJECTION, SOLUTION INTRAVASCULAR
Status: COMPLETED | OUTPATIENT
Start: 2025-02-13 | End: 2025-02-13

## 2025-02-13 RX ADMIN — IOPAMIDOL 100 ML: 755 INJECTION, SOLUTION INTRAVENOUS at 12:43

## 2025-02-13 NOTE — PROGRESS NOTES
"Chief Complaint: Follow-up (Microhematuria)    Subjective         History of Present Illness  Lindsey Mckinnon is a 59 y.o. female presents to [facility] to be seen for follow-up hematuria and urine odor.    At last visit we ordered a UA micro and PCR culture to be performed.    The patient had 0-2 red blood cells per high-powered field noted on last UA micro and her PCR culture returned positive for Aerococcus, Enterococcus, E. coli and she was treated based on susceptibility.    She took 7 of the 10 days of atbx and kept the rest in case of a UTI.     She states that she did take another 3 days worth of antibiotics for \"discomfort\" since we last saw her.    We started the patient on Estrace for GSM.    We have had no further correspondence in regards to UTI symptoms since we last saw her.    She states he has continued to have Uti symptoms.    The patient saw her PCP 1/29/2025 for a follow-up appointment.  Her UA in office at that time revealed small leukocytes 30 mg/dL of protein 15 mg/dL of ketones small bilirubin and small blood.    Urine culture was sent which was subsequently negative for significant bacterial growth consistent with a UTI.    Nocturia x every hour and \"left kidney pain\"     She states she has significant urinary and frequency and is worse during the day.    PVR 0      Previous:      Patient tolerated well with no immediate complications.  Patient last seen by myself in September 2021 for hematuria.    Patient was placed on tamsulosin by myself for straining to urinate and was to follow-up in 4 weeks.    Patient was referred back to urology from PCP office after complaining of urinary urgency frequency and dysuria and noted to have moderate blood on dipstick only.    Patient subsequent urine culture was negative for bacterial growth.    No UA micro had been performed.    Patient was referred over to urology as she was \"still having pain and uncomfortable\" and " "was noted to have hematuria.    Patient had cystoscopy with Dr. Renteria in 8 of 2021 which revealed no identifiable etiology for hematuria. Dr. Fajardo recommended repeat w/u in 5 years unless she is with     She states foul urine odor most of the time.     She state she voids frequently but she states she drinks a lot of water.    She states she has a discomfort \" on the outside at times\".     She states azo helps her symptoms at times.     She states she feels her urine stream is slow at times.     She states urgency with urination.    No family hx of  malignancies.     She is a smoker.      Objective     Past Medical History:   Diagnosis Date    Acute ST elevation myocardial infarction (STEMI) involving right coronary artery 08/25/2022    Alcoholism     Anemia     Anxiety     Arthritis     Asthma     Bipolar disorder     Breast lump     Cervical spine pain     Chronic allergic rhinitis     Chronic pain disorder     COPD (chronic obstructive pulmonary disease) 04/15/2021    Coronary artery disease     Deep vein thrombosis     Degenerative disc disease, cervical     Depression     Esophageal reflux     Essential hypertension 04/15/2021    Fibromyalgia     Forgetfulness     Gall stones     GERD (gastroesophageal reflux disease)     H/O emphysema     Head injury     Heart attack 08/2022    Hemorrhoid     Hernia cerebri     Hernia, hiatal     Herniated disc, cervical     Hyperlipidemia 04/15/2021    Kidney stone     Limb swelling     Lumbar pain     Lumbosacral disc disease     Migraine     Mood disorder     Muscle cramps     Nicotine dependence 04/15/2021    Peripheral neuropathy     Reflux esophagitis     Shortness of breath     Spinal stenosis     STEMI (ST elevation myocardial infarction) 08/25/2022    Thoracic disc disorder     Tremor 04/15/2021       Past Surgical History:   Procedure Laterality Date    CARDIAC CATHETERIZATION N/A 08/25/2022    Procedure: Left Heart Cath;  Surgeon: Adam eHlms MD;  " Location: [facility] INVASIVE [facility];  Service: Cardiovascular;  Laterality: N/A;    CAROTID STENT      CERVICAL EPIDURAL      STERIOD INJECTIONS      SECTION      X 2     SECTION  ,    CHOLECYSTECTOMY      ENDOSCOPY      EPIDURAL BLOCK      GALLBLADDER SURGERY      HYSTERECTOMY      HYSTERECTOMY      LUMBAR EPIDURAL INJECTION      STERIOD    TRIGGER POINT INJECTION      VASCULAR SURGERY           Current Outpatient Medications:     albuterol (ACCUNEB) 0.63 MG/3ML nebulizer solution, Take 3 mL by nebulization Every 6 (Six) Hours As Needed for Wheezing., Disp: 3 mL, Rfl: 12    diclofenac (VOLTAREN) 50 MG EC tablet, TAKE 1 TABLET BY MOUTH TWICE DAILY, Disp: 60 tablet, Rfl: 1    estradiol (ESTRACE) 0.1 MG/GM vaginal cream, Apply 0.5gm  inside the vagina and rub in and 0.5gm around the vestibule and massage and as well as around urethra.  3 times a week at night, Disp: 42.5 g, Rfl: 6    Fluticasone-Umeclidin-Vilant (Trelegy Ellipta) 100-62.5-25 MCG/ACT inhaler, Inhale 1 puff Daily., Disp: 28 each, Rfl: 5    loratadine (CLARITIN) 10 MG tablet, Take 1 tablet by mouth Daily., Disp: 90 tablet, Rfl: 1    losartan (Cozaar) 100 MG tablet, Take 1 tablet by mouth Daily., Disp: 90 tablet, Rfl: 1    metaxalone (SKELAXIN) 800 MG tablet, Take 1 tablet by mouth 3 (Three) Times a Day As Needed., Disp: , Rfl:     oxyCODONE ER (oxyCONTIN) 15 MG tablet extended-release 12 hour, Take 1 tablet by mouth Every 12 (Twelve) Hours., Disp: , Rfl:     RABEprazole (ACIPHEX) 20 MG EC tablet, Take 1 tablet by mouth Daily., Disp: 90 tablet, Rfl: 1    sertraline (ZOLOFT) 100 MG tablet, Take 1.5 tablets by mouth Every Night., Disp: 135 tablet, Rfl: 1    vitamin D (ERGOCALCIFEROL) 1.25 MG (35070 UT) capsule capsule, Take 1 capsule by mouth 1 (One) Time Per Week., Disp: 12 capsule, Rfl: 1    Vibegron 75 MG tablet, Take 1 tablet by mouth Daily., Disp: 90 tablet, Rfl: 3    Vibegron 75 MG tablet, Take 1 tablet by mouth  "Daily., Disp: 28 tablet, Rfl: 0  No current facility-administered medications for this visit.    Allergies   Allergen Reactions    Morphine Itching    Sulfa Antibiotics Nausea And Vomiting    Atorvastatin Myalgia        Family History   Problem Relation Age of Onset    Depression Mother     Bipolar disorder Mother     Lung cancer Mother         MALIGNANT    Diabetes Mother         UNSPECIFIED TYPE    Cancer Mother         BLADDER    Osteoporosis Mother     Arthritis Mother     Lung cancer Father         MALIGNANT    Diabetes Father         UNSPECIFIED TYPE/ MELLITUS    Heart attack Father     Kidney cancer Father     Cancer Father         BLADDER    Arthritis Father     Heart disease Father     OCD Brother     Depression Brother     Diabetes Brother         UNSPECIFIED TYPE/MELLITUS    Kidney cancer Brother     Arthritis Brother     Paranoid behavior Brother     OCD Brother     Arthritis Son        Social History     Socioeconomic History    Marital status:    Tobacco Use    Smoking status: Every Day     Current packs/day: 0.50     Average packs/day: 0.5 packs/day for 15.0 years (7.5 ttl pk-yrs)     Types: Cigarettes     Passive exposure: Never    Smokeless tobacco: Never    Tobacco comments:     STARTED AT AGE 15   Vaping Use    Vaping status: Never Used   Substance and Sexual Activity    Alcohol use: Not Currently    Drug use: Never     Types: Marijuana    Sexual activity: Not Currently     Birth control/protection: Tubal ligation       Vital Signs:   Resp 18   Ht 165.1 cm (65\")   Wt 101 kg (223 lb)   BMI 37.11 kg/m²      Physical Exam     Result Review :   The following data was reviewed by: SOFIA Dias on 02/13/2025:  Results for orders placed or performed in visit on 02/13/25   POC Urinalysis Dipstick, Automated    Collection Time: 02/13/25  9:26 AM    Specimen: Urine   Result Value Ref Range    Color Yellow Yellow, Straw, Dark Yellow, Bernadine    Clarity, UA Clear Clear    Specific Paden  " 1.025 1.005 - 1.030    pH, Urine 7.0 5.0 - 8.0    Leukocytes Negative Negative    Nitrite, UA Negative Negative    Protein, POC Negative Negative mg/dL    Glucose, UA Negative Negative mg/dL    Ketones, UA Negative Negative    Urobilinogen, UA 0.2 E.U./dL Normal, 0.2 E.U./dL    Bilirubin Negative Negative    Blood, UA Trace (A) Negative    Lot Number [id number]     Expiration Date 10/2,025    Bladder Scan    Collection Time: 02/13/25  9:32 AM   Result Value Ref Range    Urine Volume 0         Bladder Scan interpretation 02/13/2025    Estimation of residual urine via BVI 3000 Verathon Bladder Scan  MA/nurse performing: landen haynes Rn   Residual Urine: 0 ml  Indication: Microhematuria    Renal stone    Urinary frequency   Position: Supine  Examination: Incremental scanning of the suprapubic area using 2.0 MHz transducer using copious amounts of acoustic gel.   Findings: An anechoic area was demonstrated which represented the bladder, with measurement of residual urine as noted. I inspected this myself. In that the residual urine was stable or insignificant, refer to plan for treatment and plan necessary at this time.          Procedures        Assessment and Plan    Diagnoses and all orders for this visit:    1. Microhematuria (Primary)  -     POC Urinalysis Dipstick, Automated  -     CT Abdomen Pelvis With & Without Contrast; Future  -     Bladder Scan  -     Vibegron 75 MG tablet; Take 1 tablet by mouth Daily.  Dispense: 28 tablet; Refill: 0    2. Renal stone  -     CT Abdomen Pelvis With & Without Contrast; Future  -     Bladder Scan  -     Vibegron 75 MG tablet; Take 1 tablet by mouth Daily.  Dispense: 28 tablet; Refill: 0    3. Urinary frequency  -     Vibegron 75 MG tablet; Take 1 tablet by mouth Daily.  Dispense: 90 tablet; Refill: 3  -     Vibegron 75 MG tablet; Take 1 tablet by mouth Daily.  Dispense: 28 tablet; Refill: 0      We will order stat CT scan to evaluate her symptoms.    Today her urinalysis is within  normal limits and does not concern for infection.    Did discuss with the patient the importance of making sure that she takes all of her antibiotics and gives us a call anytime she is with symptoms so that we can appropriately evaluate her and that she does not need to be seen in the office every time she has symptoms.    Gemtesa selected rather than anticholinergic medication as anticholinergic medications have been shown to result in acute impairment of working memory, attention, and psychomotor speed.  Additionally recent evidence suggests that there long-term use may be associated with global cognitive impairment.  As such I will not use anticholinergics in this patient as I believe the risks outweigh the benefits.  DOI: 10.  1001/J AMA intern [id number]     Discussed at length that one of the limitations to obtaining gemtesa is that it can be cost prohibitive.  Order sent to pharmacy-will attempt prior authorization if necessary to obtain insurance approval however, patient aware that despite our best efforts often times it remains too high of cost for patients to continue.   sample provided; assess efficacy at follow up aware it can take up to 4-6 wks for max benefit.      I spent 10 minutes caring for Lindsey on this date of service. This time includes time spent by me in the following activities:reviewing tests, obtaining and/or reviewing a separately obtained history, performing a medically appropriate examination and/or evaluation , counseling and educating the patient/family/caregiver, ordering medications, tests, or procedures, and documenting information in the medical record  Follow Up   Return in about 3 months (around 5/13/2025) for f/u oab with pVR .  Patient was given instructions and counseling regarding her condition or for health maintenance advice. Please see specific information pulled into the AVS if appropriate.         This document has been electronically signed by Cesilia Delaney  SOFIA  February 13, 2025 13:08 EST

## 2025-02-24 ENCOUNTER — TELEPHONE (OUTPATIENT)
Dept: UROLOGY | Age: 60
End: 2025-02-24
Payer: COMMERCIAL

## 2025-02-24 NOTE — TELEPHONE ENCOUNTER
I CALLED THE PATIENT AND READ THE ENTIRE MESSAGE TO HER, PER ESTELA.    SHE SAID THAT SHE KNEW ALL OF THIS, BECAUSE IT WAS IN MY CHART.  SHE KNOWS IT ISN'T THE STONE, BUT IT IS SOMETHING WITH HER URINE.    I TOLD HER AGAIN THAT ESTELA SAID THAT IF SHE IS THIS ILL, SHE NEEDS TO GO TO THE ED.  STONES DO NOT CAUSE ABDOMINAL SWELLING AND ABDOMINAL TENDERNESS.    SHE VOICED UNDERSTANDING.

## 2025-02-24 NOTE — TELEPHONE ENCOUNTER
"I had sent this message to her      \"Your CT scan does not reveal any kidney stones that are currently passing.  You do have stones up in your left kidney however it is highly unlikely that these are causing your symptoms at this time given their locations in your kidney.  Your urine today was also negative for a urinary tract infection .  Please reach out to us if you are having any symptoms of urinary tract infection so that we can order an outpatient lab.  You do not need to come to the office to be seen anytime you have symptoms.  Your urgency and frequency with urination are likely related to overactive bladder.  Smoking, caffeine, any foods or drinks that contain a lot of citric acid or potassium can also irritate the bladder and cause more urgency and frequency with urination.\"     I do not think this is the cause of her symptoms if she is this ill she needs to go to the ED to be seen.  Stones do not cause abdominal swelling and abdominal tenderness.  "

## 2025-02-24 NOTE — TELEPHONE ENCOUNTER
PATIENT CALLED.  SHE SAID SHE HAD A CT, AND HAS A STONE HIGH IN LEFT KIDNEY.      HER STOMACH IS BLOATED AND SWOLLEN AND PAINFUL TO TOUCH. IF SHE PUSHES ON IT, IT MAKES HER SCREAM.  SHE SAID SHE LOOKS LIKE SHE IS PREGNANT.    SHE HAS PAIN IN THE LEFT BACK AND SIDE MAINLY, BUT IT GOES ACROSS HER BACK AND TO THE RIGHT SIDE.    BURNING AT END OF URINATION.  SHE THINKS SHE IS EMPTYING.  SHE URINATES AND WAITS A FEW SECONDS AND PUSHES MORE OUT.    SHE ASKED TO SPEAK TO ESTELA OR NURSE.    #[phone]

## 2025-02-25 ENCOUNTER — OFFICE VISIT (OUTPATIENT)
Dept: FAMILY MEDICINE CLINIC | Facility: CLINIC | Age: 60
End: 2025-02-25
Payer: COMMERCIAL

## 2025-02-25 ENCOUNTER — HOSPITAL ENCOUNTER (OUTPATIENT)
Dept: GENERAL RADIOLOGY | Facility: HOSPITAL | Age: 60
Discharge: HOME | End: 2025-02-25
Admitting: NURSE PRACTITIONER
Payer: COMMERCIAL

## 2025-02-25 VITALS
BODY MASS INDEX: 37.29 KG/M2 | DIASTOLIC BLOOD PRESSURE: 79 MMHG | TEMPERATURE: 97.3 F | SYSTOLIC BLOOD PRESSURE: 119 MMHG | OXYGEN SATURATION: 94 % | HEART RATE: 116 BPM | WEIGHT: 223.8 LBS | HEIGHT: 65 IN

## 2025-02-25 DIAGNOSIS — R35.0 URINARY FREQUENCY: Primary | ICD-10-CM

## 2025-02-25 DIAGNOSIS — R35.0 URINARY FREQUENCY: ICD-10-CM

## 2025-02-25 DIAGNOSIS — N30.90 CYSTITIS: ICD-10-CM

## 2025-02-25 LAB
BILIRUB BLD-MCNC: NEGATIVE MG/DL
CLARITY, POC: ABNORMAL
COLOR UR: ABNORMAL
EXPIRATION DATE: ABNORMAL
GLUCOSE UR STRIP-MCNC: NEGATIVE MG/DL
KETONES UR QL: NEGATIVE
LEUKOCYTE EST, POC: ABNORMAL
Lab: ABNORMAL
NITRITE UR-MCNC: POSITIVE MG/ML
PH UR: 7 [PH] (ref 5–8)
PROT UR STRIP-MCNC: ABNORMAL MG/DL
RBC # UR STRIP: ABNORMAL /UL
SP GR UR: 1.01 (ref 1–1.03)
UROBILINOGEN UR QL: NORMAL

## 2025-02-25 PROCEDURE — 1159F MED LIST DOCD IN RCRD: CPT | Performed by: NURSE PRACTITIONER

## 2025-02-25 PROCEDURE — 3074F SYST BP LT 130 MM HG: CPT | Performed by: NURSE PRACTITIONER

## 2025-02-25 PROCEDURE — 99213 OFFICE O/P EST LOW 20 MIN: CPT | Performed by: NURSE PRACTITIONER

## 2025-02-25 PROCEDURE — 87086 URINE CULTURE/COLONY COUNT: CPT | Performed by: NURSE PRACTITIONER

## 2025-02-25 PROCEDURE — 1160F RVW MEDS BY RX/DR IN RCRD: CPT | Performed by: NURSE PRACTITIONER

## 2025-02-25 PROCEDURE — 87088 URINE BACTERIA CULTURE: CPT | Performed by: NURSE PRACTITIONER

## 2025-02-25 PROCEDURE — 74018 RADEX ABDOMEN 1 VIEW: CPT

## 2025-02-25 PROCEDURE — 87186 SC STD MICRODIL/AGAR DIL: CPT | Performed by: NURSE PRACTITIONER

## 2025-02-25 PROCEDURE — 3078F DIAST BP <80 MM HG: CPT | Performed by: NURSE PRACTITIONER

## 2025-02-25 PROCEDURE — 1125F AMNT PAIN NOTED PAIN PRSNT: CPT | Performed by: NURSE PRACTITIONER

## 2025-02-25 RX ORDER — NITROFURANTOIN 25; 75 MG/1; MG/1
100 CAPSULE ORAL 2 TIMES DAILY
Qty: 20 CAPSULE | Refills: 0 | Status: SHIPPED | OUTPATIENT
Start: 2025-02-25

## 2025-02-25 NOTE — PROGRESS NOTES
Chief Complaint  Dysuria and Abdominal Pain    SUBJECTIVE  Lindsey Mckinnon presents to [facility]    Patient complaining of continuing burning with urination, abdominal pain, abdominal distension X 5 days.  Patient states she now has vaginal burning, vaginal itching.  Patient reports urinary urgency, urinary frequency.  Patient is managed per MAIKOL Delaney, Urology.  Last UA dip 2/13/25 showed only a trace of blood.  Last UA Culture 1/29/25 was normal.  Patient started on Gemtesa per Urology for OAB.    History of Present Illness  Past Medical History:   Diagnosis Date    Acute ST elevation myocardial infarction (STEMI) involving right coronary artery 08/25/2022    Alcoholism     Anemia     Anxiety     Arthritis     Asthma     Bipolar disorder     Breast lump     Cervical spine pain     Chronic allergic rhinitis     Chronic pain disorder     COPD (chronic obstructive pulmonary disease) 04/15/2021    Coronary artery disease     Deep vein thrombosis     Degenerative disc disease, cervical     Depression     Esophageal reflux     Essential hypertension 04/15/2021    Fibromyalgia     Forgetfulness     Gall stones     GERD (gastroesophageal reflux disease)     H/O emphysema     Head injury     Heart attack 08/2022    Hemorrhoid     Hernia cerebri     Hernia, hiatal     Herniated disc, cervical     Hyperlipidemia 04/15/2021    Kidney stone     Limb swelling     Lumbar pain     Lumbosacral disc disease     Migraine     Mood disorder     Muscle cramps     Nicotine dependence 04/15/2021    Peripheral neuropathy     Reflux esophagitis     Shortness of breath     Spinal stenosis     STEMI (ST elevation myocardial infarction) 08/25/2022    Thoracic disc disorder     Tremor 04/15/2021      Family History   Problem Relation Age of Onset    Depression Mother     Bipolar disorder Mother     Lung cancer Mother         MALIGNANT    Diabetes Mother         UNSPECIFIED TYPE    Cancer Mother          BLADDER    Osteoporosis Mother     Arthritis Mother     Lung cancer Father         MALIGNANT    Diabetes Father         UNSPECIFIED TYPE/ MELLITUS    Heart attack Father     Kidney cancer Father     Cancer Father         BLADDER    Arthritis Father     Heart disease Father     OCD Brother     Depression Brother     Diabetes Brother         UNSPECIFIED TYPE/MELLITUS    Kidney cancer Brother     Arthritis Brother     Paranoid behavior Brother     OCD Brother     Arthritis Son       Past Surgical History:   Procedure Laterality Date    CARDIAC CATHETERIZATION N/A 2022    Procedure: Left Heart Cath;  Surgeon: Adam Helms MD;  Location: [facility] CATH INVASIVE LOCATION;  Service: Cardiovascular;  Laterality: N/A;    CAROTID STENT      CERVICAL EPIDURAL      STERIOD INJECTIONS      SECTION      X 2     SECTION  ,    CHOLECYSTECTOMY      ENDOSCOPY      EPIDURAL BLOCK      GALLBLADDER SURGERY      HYSTERECTOMY      HYSTERECTOMY      LUMBAR EPIDURAL INJECTION      STERIOD    TRIGGER POINT INJECTION      VASCULAR SURGERY          Current Outpatient Medications:     albuterol (ACCUNEB) 0.63 MG/3ML nebulizer solution, Take 3 mL by nebulization Every 6 (Six) Hours As Needed for Wheezing., Disp: 3 mL, Rfl: 12    diclofenac (VOLTAREN) 50 MG EC tablet, TAKE 1 TABLET BY MOUTH TWICE DAILY, Disp: 60 tablet, Rfl: 1    estradiol (ESTRACE) 0.1 MG/GM vaginal cream, Apply 0.5gm  inside the vagina and rub in and 0.5gm around the vestibule and massage and as well as around urethra.  3 times a week at night, Disp: 42.5 g, Rfl: 6    Fluticasone-Umeclidin-Vilant (Trelegy Ellipta) 100-62.5-25 MCG/ACT inhaler, Inhale 1 puff Daily., Disp: 28 each, Rfl: 5    loratadine (CLARITIN) 10 MG tablet, Take 1 tablet by mouth Daily., Disp: 90 tablet, Rfl: 1    losartan (Cozaar) 100 MG tablet, Take 1 tablet by mouth Daily., Disp: 90 tablet, Rfl: 1    metaxalone (SKELAXIN) 800 MG tablet, Take 1 tablet by mouth 3  "(Three) Times a Day As Needed., Disp: , Rfl:     oxyCODONE ER (oxyCONTIN) 15 MG tablet extended-release 12 hour, Take 1 tablet by mouth Every 12 (Twelve) Hours., Disp: , Rfl:     RABEprazole (ACIPHEX) 20 MG EC tablet, Take 1 tablet by mouth Daily., Disp: 90 tablet, Rfl: 1    sertraline (ZOLOFT) 100 MG tablet, Take 1.5 tablets by mouth Every Night., Disp: 135 tablet, Rfl: 1    Vibegron 75 MG tablet, Take 1 tablet by mouth Daily., Disp: 90 tablet, Rfl: 3    vitamin D (ERGOCALCIFEROL) 1.25 MG (64527 UT) capsule capsule, Take 1 capsule by mouth 1 (One) Time Per Week., Disp: 12 capsule, Rfl: 1    nitrofurantoin, macrocrystal-monohydrate, (Macrobid) 100 MG capsule, Take 1 capsule by mouth 2 (Two) Times a Day., Disp: 20 capsule, Rfl: 0    OBJECTIVE  Vital Signs:   /79 (BP Location: Left arm, Patient Position: Sitting, Cuff Size: Large Adult)   Pulse 116   Temp 97.3 °F (36.3 °C) (Temporal)   Ht 165.1 cm (65\")   Wt 102 kg (223 lb 12.8 oz)   SpO2 94%   BMI 37.24 kg/m²    Estimated body mass index is 37.24 kg/m² as calculated from the following:    Height as of this encounter: 165.1 cm (65\").    Weight as of this encounter: 102 kg (223 lb 12.8 oz).     Wt Readings from Last 3 Encounters:   02/25/25 102 kg (223 lb 12.8 oz)   02/13/25 101 kg (223 lb)   01/29/25 101 kg (223 lb 3.2 oz)     BP Readings from Last 3 Encounters:   02/25/25 119/79   01/29/25 121/77   01/29/25 103/77       Physical Exam  Vitals reviewed.   Constitutional:       Appearance: Normal appearance. She is well-developed.   HENT:      Head: Normocephalic and atraumatic.      Right Ear: External ear normal.      Left Ear: External ear normal.      Mouth/Throat:      Pharynx: No oropharyngeal exudate.   Eyes:      Conjunctiva/sclera: Conjunctivae normal.      Pupils: Pupils are equal, round, and reactive to light.   Cardiovascular:      Rate and Rhythm: Normal rate and regular rhythm.      Pulses: Normal pulses.      Heart sounds: Normal heart sounds. " No murmur heard.     No friction rub. No gallop.   Pulmonary:      Effort: Pulmonary effort is normal.      Breath sounds: Normal breath sounds. No wheezing or rhonchi.   Abdominal:      Tenderness:  in the suprapubic area   Skin:     General: Skin is warm and dry.   Neurological:      Mental Status: She is alert and oriented to person, place, and time.      Cranial Nerves: No cranial nerve deficit.   Psychiatric:         Mood and Affect: Mood and affect normal.         Behavior: Behavior normal.         Thought Content: Thought content normal.         Judgment: Judgment normal.     Bilateral flank area tender to palpation.    Result Review      POCT urinalysis dipstick, automated  Order: [id number]  Status: Final result       Visible to patient: Magaly (scheduled for 2/25/2025 12:49 PM)       Next appt: 04/15/2025 at 01:00 PM in Orthopedic Surgery (Jose Beckwith MD)       Dx: Urinary frequency    Specimen Information: Urine   0 Result Notes             Component  Ref Range & Units 11:48  (2/25/25) 12 d ago  (2/13/25) 3 wk ago  (1/29/25) 5 mo ago  (9/19/24) 6 mo ago  (7/31/24) 11 mo ago  (3/4/24) 1 yr ago  (2/19/24) 1 yr ago  (2/19/24)   Color  Yellow, Straw, Dark Yellow, Bernadine Dark Yellow Yellow Dark Yellow Yellow Yellow Yellow     Clarity, UA  Clear Slightly Cloudy Abnormal  Clear Clear Clear Clear Clear     Specific Gravity  1.005 - 1.030 1.015 1.025 1.025 1.020 1.025 1.020     pH, Urine  5.0 - 8.0 7.0 7.0 6.0 6.5 6.0 7.0     Leukocytes  Negative Moderate (2+) Abnormal  Negative Small (1+) Abnormal  Trace Abnormal  Trace Abnormal  Trace Abnormal      Nitrite, UA  Negative Positive Abnormal  Negative Negative Negative Negative Negative     Protein, POC  Negative mg/dL 30 mg/dL Abnormal  Negative 30 mg/dL Abnormal  Negative Negative Negative     Glucose, UA  Negative mg/dL Negative Negative Negative Negative Negative Negative     Ketones, UA  Negative Negative Negative 15 mg/dL Abnormal  Trace Abnormal  15 mg/dL  Abnormal  Negative     Urobilinogen, UA  Normal, 0.2 E.U./dL Normal 0.2 E.U./dL Normal Normal Normal Normal     Bilirubin  Negative Negative Negative Small (1+) Abnormal  Small (1+) Abnormal  Negative Negative     Blood, UA  Negative Moderate Abnormal  Trace Abnormal  Small Abnormal  Small Abnormal  Moderate Abnormal  Trace Abnormal      Lot Number [id number]   Expiration Date 7/30/25 1              CT Abdomen Pelvis With & Without Contrast    Result Date: 2/13/2025  Impression: 1.No acute process identified within abdomen/pelvis. 2.Nonobstructing left renal stones. Electronically Signed: Bang Pugh MD  2/13/2025 1:42 PM EST  Workstation ID: [id number]    MRI Lumbar Spine Without Contrast    Result Date: 11/22/2024  Impression: Degenerative change of the spine most advanced at L5-S1 where there is severe right neural foraminal stenosis. This is similar to prior exam. Additional levels and details as above. Electronically Signed: Solis Ferrer MD  11/22/2024 5:22 PM EST  Workstation ID: [id number]        The above data has been reviewed by SOFIA Grullon 02/25/2025 11:26 EST.          Patient Care Team:  Mady Mcnally APRN as PCP - General (Family Medicine)  Arminda Arteaga APRN as Nurse Practitioner (Pain Medicine)  Adam Helms MD as Consulting Physician (Cardiology)  Franco Landa PA-C as Physician Assistant (Physician Assistant)  Carlo Drew Jr., MD as Consulting Physician (Pain Medicine)  Sergio Wilkins PA as Physician Assistant (Orthopedic Surgery)  Jose Beckwith MD as Consulting Physician (Orthopedic Surgery)  Adam Helms MD as Consulting Physician (Cardiology)            ASSESSMENT & PLAN    Diagnoses and all orders for this visit:    1. Urinary frequency (Primary)  -     POCT urinalysis dipstick, automated  -     XR Abdomen KUB; Future    2. Cystitis  -     Urine Culture - Urine, Urine, Clean  Catch; Future  -     XR Abdomen KUB; Future  -     nitrofurantoin, macrocrystal-monohydrate, (Macrobid) 100 MG capsule; Take 1 capsule by mouth 2 (Two) Times a Day.  Dispense: 20 capsule; Refill: 0  -     Urine Culture - Urine, Urine, Clean Catch       Increase fluids, reviewed hygiene techniques with patient.  Will send urine for culture.  Tobacco Use: High Risk (2/25/2025)    Patient History     Smoking Tobacco Use: Every Day     Smokeless Tobacco Use: Never     Passive Exposure: Never       Follow Up     Return if symptoms worsen or fail to improve.      Patient was given instructions and counseling regarding her condition or for health maintenance advice. Please see specific information pulled into the AVS if appropriate.   I have reviewed information obtained and documented by others and I have confirmed the accuracy of this documented note.    SOFIA Grullon

## 2025-02-27 LAB — BACTERIA SPEC AEROBE CULT: ABNORMAL

## 2025-03-10 DIAGNOSIS — E55.9 VITAMIN D DEFICIENCY: ICD-10-CM

## 2025-03-10 RX ORDER — ERGOCALCIFEROL 1.25 MG/1
50000 CAPSULE, LIQUID FILLED ORAL WEEKLY
Qty: 12 CAPSULE | Refills: 1 | Status: SHIPPED | OUTPATIENT
Start: 2025-03-10

## 2025-04-02 ENCOUNTER — LAB (OUTPATIENT)
Dept: LAB | Facility: HOSPITAL | Age: 60
End: 2025-04-02
Payer: COMMERCIAL

## 2025-04-02 DIAGNOSIS — R79.89 ABNORMAL TSH: ICD-10-CM

## 2025-04-02 DIAGNOSIS — R35.0 URINARY FREQUENCY: ICD-10-CM

## 2025-04-02 DIAGNOSIS — D72.829 LEUKOCYTOSIS, UNSPECIFIED TYPE: ICD-10-CM

## 2025-04-02 LAB
BACTERIA UR QL AUTO: ABNORMAL /HPF
BASOPHILS # BLD AUTO: 0.07 10*3/MM3 (ref 0–0.2)
BASOPHILS NFR BLD AUTO: 0.6 % (ref 0–1.5)
BILIRUB UR QL STRIP: NEGATIVE
CLARITY UR: ABNORMAL
COLOR UR: ABNORMAL
DEPRECATED RDW RBC AUTO: 45.6 FL (ref 37–54)
EOSINOPHIL # BLD AUTO: 0.3 10*3/MM3 (ref 0–0.4)
EOSINOPHIL NFR BLD AUTO: 2.5 % (ref 0.3–6.2)
ERYTHROCYTE [DISTWIDTH] IN BLOOD BY AUTOMATED COUNT: 13.5 % (ref 12.3–15.4)
GLUCOSE UR STRIP-MCNC: NEGATIVE MG/DL
HCT VFR BLD AUTO: 44.7 % (ref 34–46.6)
HGB BLD-MCNC: 15.4 G/DL (ref 12–15.9)
HGB UR QL STRIP.AUTO: ABNORMAL
HOLD SPECIMEN: NORMAL
HYALINE CASTS UR QL AUTO: ABNORMAL /LPF
IMM GRANULOCYTES # BLD AUTO: 0.07 10*3/MM3 (ref 0–0.05)
IMM GRANULOCYTES NFR BLD AUTO: 0.6 % (ref 0–0.5)
KETONES UR QL STRIP: ABNORMAL
LEUKOCYTE ESTERASE UR QL STRIP.AUTO: ABNORMAL
LYMPHOCYTES # BLD AUTO: 3.38 10*3/MM3 (ref 0.7–3.1)
LYMPHOCYTES NFR BLD AUTO: 27.7 % (ref 19.6–45.3)
MCH RBC QN AUTO: 32.1 PG (ref 26.6–33)
MCHC RBC AUTO-ENTMCNC: 34.5 G/DL (ref 31.5–35.7)
MCV RBC AUTO: 93.1 FL (ref 79–97)
MONOCYTES # BLD AUTO: 0.79 10*3/MM3 (ref 0.1–0.9)
MONOCYTES NFR BLD AUTO: 6.5 % (ref 5–12)
NEUTROPHILS NFR BLD AUTO: 62.1 % (ref 42.7–76)
NEUTROPHILS NFR BLD AUTO: 7.58 10*3/MM3 (ref 1.7–7)
NITRITE UR QL STRIP: POSITIVE
NRBC BLD AUTO-RTO: 0 /100 WBC (ref 0–0.2)
PH UR STRIP.AUTO: 5.5 [PH] (ref 5–8)
PLATELET # BLD AUTO: 334 10*3/MM3 (ref 140–450)
PMV BLD AUTO: 10.9 FL (ref 6–12)
PROT UR QL STRIP: ABNORMAL
RBC # BLD AUTO: 4.8 10*6/MM3 (ref 3.77–5.28)
RBC # UR STRIP: ABNORMAL /HPF
REF LAB TEST METHOD: ABNORMAL
SP GR UR STRIP: 1.02 (ref 1–1.03)
SQUAMOUS #/AREA URNS HPF: ABNORMAL /HPF
T4 FREE SERPL-MCNC: 1.24 NG/DL (ref 0.92–1.68)
TSH SERPL DL<=0.05 MIU/L-ACNC: 4.36 UIU/ML (ref 0.27–4.2)
UROBILINOGEN UR QL STRIP: ABNORMAL
WBC # UR STRIP: ABNORMAL /HPF
WBC CLUMPS # UR AUTO: PRESENT /HPF
WBC NRBC COR # BLD AUTO: 12.19 10*3/MM3 (ref 3.4–10.8)

## 2025-04-02 PROCEDURE — 87077 CULTURE AEROBIC IDENTIFY: CPT

## 2025-04-02 PROCEDURE — 87186 SC STD MICRODIL/AGAR DIL: CPT

## 2025-04-02 PROCEDURE — 36415 COLL VENOUS BLD VENIPUNCTURE: CPT

## 2025-04-02 PROCEDURE — 85025 COMPLETE CBC W/AUTO DIFF WBC: CPT

## 2025-04-02 PROCEDURE — 81001 URINALYSIS AUTO W/SCOPE: CPT

## 2025-04-02 PROCEDURE — 84443 ASSAY THYROID STIM HORMONE: CPT

## 2025-04-02 PROCEDURE — 87088 URINE BACTERIA CULTURE: CPT

## 2025-04-02 PROCEDURE — 87086 URINE CULTURE/COLONY COUNT: CPT

## 2025-04-02 PROCEDURE — 84439 ASSAY OF FREE THYROXINE: CPT

## 2025-04-04 LAB — BACTERIA SPEC AEROBE CULT: ABNORMAL

## 2025-04-09 DIAGNOSIS — J44.9 COPD WITHOUT EXACERBATION: ICD-10-CM

## 2025-04-09 RX ORDER — FLUTICASONE FUROATE, UMECLIDINIUM BROMIDE AND VILANTEROL TRIFENATATE 100; 62.5; 25 UG/1; UG/1; UG/1
1 POWDER RESPIRATORY (INHALATION) DAILY
Qty: 60 EACH | Refills: 1 | Status: SHIPPED | OUTPATIENT
Start: 2025-04-09

## 2025-04-15 ENCOUNTER — OFFICE VISIT (OUTPATIENT)
Dept: ORTHOPEDIC SURGERY | Facility: CLINIC | Age: 60
End: 2025-04-15
Payer: COMMERCIAL

## 2025-04-15 VITALS — BODY MASS INDEX: 37.15 KG/M2 | HEIGHT: 65 IN | WEIGHT: 223 LBS

## 2025-04-15 DIAGNOSIS — M76.899 TENDINITIS OF HIP, UNSPECIFIED LATERALITY: ICD-10-CM

## 2025-04-15 DIAGNOSIS — M25.551 BILATERAL HIP PAIN: Primary | ICD-10-CM

## 2025-04-15 DIAGNOSIS — M25.552 BILATERAL HIP PAIN: Primary | ICD-10-CM

## 2025-04-15 RX ADMIN — LIDOCAINE HYDROCHLORIDE 5 ML: 10 INJECTION, SOLUTION INFILTRATION; PERINEURAL at 13:10

## 2025-04-15 RX ADMIN — TRIAMCINOLONE ACETONIDE 40 MG: 40 INJECTION, SUSPENSION INTRA-ARTICULAR; INTRAMUSCULAR at 13:10

## 2025-04-15 NOTE — PROGRESS NOTES
"Chief Complaint  Follow-up and Pain of the Left Hip and Follow-up and Pain of the Right Hip       Subjective      Lindsey Mckinnon presents to [facility] for a follow up for bilateral hips. She has been treating her bilateral hip bursitis conservatively. She was last seen in the office on 01/14/25 where she had bilateral hip steroid injections. She states these injections gave her great relief but has recently worn off. She is requesting repeat injections today in the office. She denies any new injury or falls since her last visit. She has been taking Diclofenac with great relief and is requesting a refill today.     Allergies   Allergen Reactions    Morphine Itching    Sulfa Antibiotics Nausea And Vomiting    Atorvastatin Myalgia        Social History     Socioeconomic History    Marital status:    Tobacco Use    Smoking status: Every Day     Current packs/day: 0.50     Average packs/day: 0.5 packs/day for 15.0 years (7.5 ttl pk-yrs)     Types: Cigarettes     Passive exposure: Never    Smokeless tobacco: Never    Tobacco comments:     STARTED AT AGE 15   Vaping Use    Vaping status: Never Used   Substance and Sexual Activity    Alcohol use: Not Currently    Drug use: Never     Types: Marijuana    Sexual activity: Not Currently     Birth control/protection: Tubal ligation        I reviewed the patient's chief complaint, history of present illness, review of systems, past medical history, surgical history, family history, social history, medications, and allergy list.     Review of Systems     Constitutional: Denies fevers, chills, weight loss  Cardiovascular: Denies chest pain, shortness of breath  Skin: Denies rashes, acute skin changes  Neurologic: Denies headache, loss of consciousness  MSK: bilateral hip pain       Vital Signs:   Ht 165.1 cm (65\")   Wt 101 kg (223 lb)   BMI 37.11 kg/m²            Ortho Exam    Physical Exam  General:Alert. No acute distress "   Bilateral lower extremity: Hip Flexion 80. Internal rotation 20. External rotation 20. No skin discoloration, atrophy or swelling. Positive EHL, FHL, GS, and TA. Sensation intact to all 5 nerves of the foot. Positive straight leg raise. Leg lengths appear equal. Ambulates with antalgic gait.     Large Joint: R hip joint  Date/Time: 4/15/2025 1:10 PM  Consent given by: patient  Site marked: site marked  Timeout: Immediately prior to procedure a time out was called to verify the correct patient, procedure, equipment, support staff and site/side marked as required   Supporting Documentation  Indications: pain   Procedure Details  Location: hip - R hip joint  Preparation: Patient was prepped and draped in the usual sterile fashion  Needle gauge: 21 G.  Medications administered: 40 mg triamcinolone acetonide 40 MG/ML; 5 mL lidocaine 1 %  Patient tolerance: patient tolerated the procedure well with no immediate complications      Large Joint: L hip joint  Date/Time: 4/15/2025 1:10 PM  Consent given by: patient  Site marked: site marked  Timeout: Immediately prior to procedure a time out was called to verify the correct patient, procedure, equipment, support staff and site/side marked as required   Supporting Documentation  Indications: pain   Procedure Details  Location: hip - L hip joint  Needle gauge: 21 G.  Medications administered: 40 mg triamcinolone acetonide 40 MG/ML; 5 mL lidocaine 1 %  Patient tolerance: patient tolerated the procedure well with no immediate complications    This injection documentation was Scribed for Jose Beckwith MD by Samreen Schwartz MA.  04/15/25   13:11 EDT   Imaging Results (Most Recent)       None             Result Review :       No results found.           Assessment and Plan     Diagnoses and all orders for this visit:    1. Bilateral hip pain (Primary)    2. Tendinitis of hip, unspecified laterality      The patient presents here today for a follow up bilateral hip bursitis.      Discussed the risks and benefits of bilateral hip steroid injections today in the office. Patient expressed understanding and wishes to proceed. Patient tolerted the injection well and without any complications.     Home exercises given today and Diclofenac 50 mg sent into the pharmacy today.     Call or return if worsening symptoms.    Follow Up     3 months     Patient was given instructions and counseling regarding her condition or for health maintenance advice. Please see specific information pulled into the AVS if appropriate.     Scribed for Jose Beckwith MD by Abbie Donahue.  04/15/25   13:00 EDT    I have personally performed the services described in this document as scribed by the above individual and it is both accurate and complete. Jose Beckwith MD 04/16/25

## 2025-04-16 RX ORDER — TRIAMCINOLONE ACETONIDE 40 MG/ML
40 INJECTION, SUSPENSION INTRA-ARTICULAR; INTRAMUSCULAR
Status: COMPLETED | OUTPATIENT
Start: 2025-04-15 | End: 2025-04-15

## 2025-04-16 RX ORDER — LIDOCAINE HYDROCHLORIDE 10 MG/ML
5 INJECTION, SOLUTION INFILTRATION; PERINEURAL
Status: COMPLETED | OUTPATIENT
Start: 2025-04-15 | End: 2025-04-15

## 2025-04-22 ENCOUNTER — TRANSCRIBE ORDERS (OUTPATIENT)
Dept: ADMINISTRATIVE | Facility: HOSPITAL | Age: 60
End: 2025-04-22
Payer: COMMERCIAL

## 2025-04-22 DIAGNOSIS — M54.51 VERTEBROGENIC LOW BACK PAIN: Primary | ICD-10-CM

## 2025-05-13 ENCOUNTER — LAB (OUTPATIENT)
Dept: LAB | Facility: HOSPITAL | Age: 60
End: 2025-05-13
Payer: COMMERCIAL

## 2025-05-13 DIAGNOSIS — R79.89 ABNORMAL TSH: ICD-10-CM

## 2025-05-13 DIAGNOSIS — D72.829 LEUKOCYTOSIS, UNSPECIFIED TYPE: ICD-10-CM

## 2025-05-13 LAB
BASOPHILS # BLD AUTO: 0.06 10*3/MM3 (ref 0–0.2)
BASOPHILS NFR BLD AUTO: 0.5 % (ref 0–1.5)
DEPRECATED RDW RBC AUTO: 46.5 FL (ref 37–54)
EOSINOPHIL # BLD AUTO: 0.21 10*3/MM3 (ref 0–0.4)
EOSINOPHIL NFR BLD AUTO: 1.8 % (ref 0.3–6.2)
ERYTHROCYTE [DISTWIDTH] IN BLOOD BY AUTOMATED COUNT: 13.1 % (ref 12.3–15.4)
HCT VFR BLD AUTO: 44.1 % (ref 34–46.6)
HGB BLD-MCNC: 14.9 G/DL (ref 12–15.9)
IMM GRANULOCYTES # BLD AUTO: 0.07 10*3/MM3 (ref 0–0.05)
IMM GRANULOCYTES NFR BLD AUTO: 0.6 % (ref 0–0.5)
LYMPHOCYTES # BLD AUTO: 2.96 10*3/MM3 (ref 0.7–3.1)
LYMPHOCYTES NFR BLD AUTO: 25.3 % (ref 19.6–45.3)
MCH RBC QN AUTO: 32.3 PG (ref 26.6–33)
MCHC RBC AUTO-ENTMCNC: 33.8 G/DL (ref 31.5–35.7)
MCV RBC AUTO: 95.7 FL (ref 79–97)
MONOCYTES # BLD AUTO: 0.77 10*3/MM3 (ref 0.1–0.9)
MONOCYTES NFR BLD AUTO: 6.6 % (ref 5–12)
NEUTROPHILS NFR BLD AUTO: 65.2 % (ref 42.7–76)
NEUTROPHILS NFR BLD AUTO: 7.65 10*3/MM3 (ref 1.7–7)
NRBC BLD AUTO-RTO: 0 /100 WBC (ref 0–0.2)
PLATELET # BLD AUTO: 312 10*3/MM3 (ref 140–450)
PMV BLD AUTO: 10.7 FL (ref 6–12)
RBC # BLD AUTO: 4.61 10*6/MM3 (ref 3.77–5.28)
T4 FREE SERPL-MCNC: 1.11 NG/DL (ref 0.92–1.68)
TSH SERPL DL<=0.05 MIU/L-ACNC: 1.15 UIU/ML (ref 0.27–4.2)
WBC NRBC COR # BLD AUTO: 11.72 10*3/MM3 (ref 3.4–10.8)

## 2025-05-13 PROCEDURE — 84443 ASSAY THYROID STIM HORMONE: CPT

## 2025-05-13 PROCEDURE — 36415 COLL VENOUS BLD VENIPUNCTURE: CPT

## 2025-05-13 PROCEDURE — 84439 ASSAY OF FREE THYROXINE: CPT

## 2025-05-13 PROCEDURE — 85025 COMPLETE CBC W/AUTO DIFF WBC: CPT

## 2025-05-16 ENCOUNTER — HOSPITAL ENCOUNTER (OUTPATIENT)
Dept: BONE DENSITY | Facility: HOSPITAL | Age: 60
Discharge: HOME | End: 2025-05-16
Payer: COMMERCIAL

## 2025-05-16 DIAGNOSIS — M54.51 VERTEBROGENIC LOW BACK PAIN: ICD-10-CM

## 2025-05-16 PROCEDURE — 77080 DXA BONE DENSITY AXIAL: CPT

## 2025-05-21 ENCOUNTER — OFFICE VISIT (OUTPATIENT)
Dept: FAMILY MEDICINE CLINIC | Facility: CLINIC | Age: 60
End: 2025-05-21
Payer: COMMERCIAL

## 2025-05-21 VITALS
HEIGHT: 65 IN | DIASTOLIC BLOOD PRESSURE: 84 MMHG | HEART RATE: 117 BPM | TEMPERATURE: 97.4 F | WEIGHT: 220.6 LBS | BODY MASS INDEX: 36.75 KG/M2 | SYSTOLIC BLOOD PRESSURE: 137 MMHG | OXYGEN SATURATION: 96 %

## 2025-05-21 DIAGNOSIS — N30.01 ACUTE CYSTITIS WITH HEMATURIA: Primary | ICD-10-CM

## 2025-05-21 DIAGNOSIS — R35.0 URINARY FREQUENCY: ICD-10-CM

## 2025-05-21 LAB
BILIRUB BLD-MCNC: NEGATIVE MG/DL
CLARITY, POC: ABNORMAL
COLOR UR: YELLOW
EXPIRATION DATE: ABNORMAL
GLUCOSE UR STRIP-MCNC: NEGATIVE MG/DL
KETONES UR QL: NEGATIVE
LEUKOCYTE EST, POC: ABNORMAL
Lab: ABNORMAL
NITRITE UR-MCNC: POSITIVE MG/ML
PH UR: 7 [PH] (ref 5–8)
PROT UR STRIP-MCNC: ABNORMAL MG/DL
RBC # UR STRIP: ABNORMAL /UL
SP GR UR: 1.01 (ref 1–1.03)
UROBILINOGEN UR QL: NORMAL

## 2025-05-21 PROCEDURE — 87186 SC STD MICRODIL/AGAR DIL: CPT | Performed by: NURSE PRACTITIONER

## 2025-05-21 PROCEDURE — 87077 CULTURE AEROBIC IDENTIFY: CPT | Performed by: NURSE PRACTITIONER

## 2025-05-21 PROCEDURE — 87086 URINE CULTURE/COLONY COUNT: CPT | Performed by: NURSE PRACTITIONER

## 2025-05-21 NOTE — PROGRESS NOTES
Chief Complaint  Urinary Tract Infection    SUBJECTIVE  Lindsey Mckinnon presents to [facility] FAM[facility]    Urinary Tract Infection  Chronicity:  New  Onset:  In the past 7 days  Frequency:  Constantly  Pain quality:  Burning  Pain severity:  Mild  Fever:  No fever  Associated symptoms: flank pain, frequency, hematuria and urgency    Treatments tried:  Nothing  PMH includes: kidney stones and recurrent UTIs      Past Medical History:   Diagnosis Date    Acute ST elevation myocardial infarction (STEMI) involving right coronary artery 08/25/2022    Alcoholism     Anemia     Anxiety     Arthritis     Asthma     Bipolar disorder     Breast lump     Cervical spine pain     Chronic allergic rhinitis     Chronic pain disorder     COPD (chronic obstructive pulmonary disease) 04/15/2021    Coronary artery disease     Deep vein thrombosis     Degenerative disc disease, cervical     Depression     Esophageal reflux     Essential hypertension 04/15/2021    Fibromyalgia     Forgetfulness     Gall stones     GERD (gastroesophageal reflux disease)     H/O emphysema     Head injury     Heart attack 08/2022    Hemorrhoid     Hernia cerebri     Hernia, hiatal     Herniated disc, cervical     Hyperlipidemia 04/15/2021    Kidney stone     Limb swelling     Lumbar pain     Lumbosacral disc disease     Migraine     Mood disorder     Muscle cramps     Nicotine dependence 04/15/2021    Peripheral neuropathy     Reflux esophagitis     Shortness of breath     Spinal stenosis     STEMI (ST elevation myocardial infarction) 08/25/2022    Thoracic disc disorder     Tremor 04/15/2021      Family History   Problem Relation Age of Onset    Depression Mother     Bipolar disorder Mother     Lung cancer Mother         MALIGNANT    Diabetes Mother         UNSPECIFIED TYPE    Cancer Mother         BLADDER    Osteoporosis Mother     Arthritis Mother     Lung cancer Father         MALIGNANT    Diabetes Father          UNSPECIFIED TYPE/ MELLITUS    Heart attack Father     Kidney cancer Father     Cancer Father         BLADDER    Arthritis Father     Heart disease Father     OCD Brother     Depression Brother     Diabetes Brother         UNSPECIFIED TYPE/MELLITUS    Kidney cancer Brother     Arthritis Brother     Paranoid behavior Brother     OCD Brother     Arthritis Son       Past Surgical History:   Procedure Laterality Date    CARDIAC CATHETERIZATION N/A 2022    Procedure: Left Heart Cath;  Surgeon: Adam Helms MD;  Location: [facility] CATH INVASIVE LOCATION;  Service: Cardiovascular;  Laterality: N/A;    CAROTID STENT      CERVICAL EPIDURAL      STERIOD INJECTIONS      SECTION      X 2     SECTION  ,    CHOLECYSTECTOMY      ENDOSCOPY      EPIDURAL BLOCK      GALLBLADDER SURGERY      HYSTERECTOMY      HYSTERECTOMY      LUMBAR EPIDURAL INJECTION      STERIOD    TRIGGER POINT INJECTION      VASCULAR SURGERY          Current Outpatient Medications:     albuterol (ACCUNEB) 0.63 MG/3ML nebulizer solution, Take 3 mL by nebulization Every 6 (Six) Hours As Needed for Wheezing., Disp: 3 mL, Rfl: 12    diclofenac (VOLTAREN) 50 MG EC tablet, Take 1 tablet by mouth 2 (Two) Times a Day., Disp: 60 tablet, Rfl: 1    estradiol (ESTRACE) 0.1 MG/GM vaginal cream, Apply 0.5gm  inside the vagina and rub in and 0.5gm around the vestibule and massage and as well as around urethra.  3 times a week at night, Disp: 42.5 g, Rfl: 6    Fluticasone-Umeclidin-Vilant (Trelegy Ellipta) 100-62.5-25 MCG/ACT inhaler, INHALE 1 PUFF BY MOUTH ONCE DAILY, Disp: 60 each, Rfl: 1    levothyroxine (SYNTHROID, LEVOTHROID) 25 MCG tablet, Take 1 tablet by mouth Every Morning., Disp: 90 tablet, Rfl: 0    loratadine (CLARITIN) 10 MG tablet, Take 1 tablet by mouth Daily., Disp: 90 tablet, Rfl: 1    losartan (Cozaar) 100 MG tablet, Take 1 tablet by mouth Daily., Disp: 90 tablet, Rfl: 1    metaxalone (SKELAXIN) 800 MG tablet, Take 1  "tablet by mouth 3 (Three) Times a Day As Needed., Disp: , Rfl:     oxyCODONE ER (oxyCONTIN) 15 MG tablet extended-release 12 hour, Take 1 tablet by mouth Every 12 (Twelve) Hours., Disp: , Rfl:     RABEprazole (ACIPHEX) 20 MG EC tablet, Take 1 tablet by mouth Daily., Disp: 90 tablet, Rfl: 1    sertraline (ZOLOFT) 100 MG tablet, Take 1.5 tablets by mouth Every Night., Disp: 135 tablet, Rfl: 1    Vibegron 75 MG tablet, Take 1 tablet by mouth Daily., Disp: 90 tablet, Rfl: 3    vitamin D (ERGOCALCIFEROL) 1.25 MG (58690 UT) capsule capsule, TAKE 1 CAPSULE BY MOUTH 1 TIME EVERY WEEK, Disp: 12 capsule, Rfl: 1    amoxicillin-clavulanate (AUGMENTIN) 875-125 MG per tablet, Take 1 tablet by mouth 2 (Two) Times a Day., Disp: 20 tablet, Rfl: 0    OBJECTIVE  Vital Signs:   /84   Pulse 117   Temp 97.4 °F (36.3 °C)   Ht 165.1 cm (65\")   Wt 100 kg (220 lb 9.6 oz)   SpO2 96%   BMI 36.71 kg/m²    Estimated body mass index is 36.71 kg/m² as calculated from the following:    Height as of this encounter: 165.1 cm (65\").    Weight as of this encounter: 100 kg (220 lb 9.6 oz).     Wt Readings from Last 3 Encounters:   05/21/25 100 kg (220 lb 9.6 oz)   04/15/25 101 kg (223 lb)   02/25/25 102 kg (223 lb 12.8 oz)     BP Readings from Last 3 Encounters:   05/21/25 137/84   02/25/25 119/79   01/29/25 121/77       Physical Exam  Vitals reviewed.   Constitutional:       Appearance: Normal appearance. She is well-developed.   HENT:      Head: Normocephalic and atraumatic.      Right Ear: External ear normal.      Left Ear: External ear normal.      Mouth/Throat:      Pharynx: No oropharyngeal exudate.   Eyes:      Conjunctiva/sclera: Conjunctivae normal.      Pupils: Pupils are equal, round, and reactive to light.   Cardiovascular:      Rate and Rhythm: Normal rate and regular rhythm.      Pulses: Normal pulses.      Heart sounds: Normal heart sounds. No murmur heard.     No friction rub. No gallop.   Pulmonary:      Effort: Pulmonary " effort is normal.      Breath sounds: Normal breath sounds. No wheezing or rhonchi.   Abdominal:      Tenderness: There is left CVA tenderness.   Skin:     General: Skin is warm and dry.   Neurological:      Mental Status: She is alert and oriented to person, place, and time.      Cranial Nerves: No cranial nerve deficit.   Psychiatric:         Mood and Affect: Mood and affect normal.         Behavior: Behavior normal.         Thought Content: Thought content normal.         Judgment: Judgment normal.          Result Review      POCT urinalysis dipstick, automated  Order: [id number]   Status: Final result       Next appt: 07/08/2025 at 10:00 AM in Urology (Cesilia Delaney, APRN)       Dx: Urinary frequency    Test Result Released: No (scheduled for 5/21/2025  2:05 PM)    Specimen Information: Urine   0 Result Notes            Component  Ref Range & Units 13:02  (5/21/25) 1 mo ago  (4/2/25) 2 mo ago  (2/25/25) 3 mo ago  (2/13/25) 3 mo ago  (1/29/25) 8 mo ago  (9/19/24) 9 mo ago  (7/31/24)   Color  Yellow, Straw, Dark Yellow, Bernadine Yellow Dark Yellow Abnormal  R Dark Yellow Yellow Dark Yellow Yellow Yellow   Clarity, UA  Clear Cloudy Abnormal  Cloudy Abnormal  Slightly Cloudy Abnormal  Clear Clear Clear Clear   Specific Gravity  1.005 - 1.030 1.015 1.020 1.015 1.025 1.025 1.020 1.025   pH, Urine  5.0 - 8.0 7.0 5.5 7.0 7.0 6.0 6.5 6.0   Leukocytes  Negative Small (1+) Abnormal  Moderate (2+) Abnormal  Moderate (2+) Abnormal  Negative Small (1+) Abnormal  Trace Abnormal  Trace Abnormal    Nitrite, UA  Negative Positive Abnormal  Positive Abnormal  Positive Abnormal  Negative Negative Negative Negative   Protein, POC  Negative mg/dL 30 mg/dL Abnormal  100 mg/dL (2+) Abnormal  R 30 mg/dL Abnormal  Negative 30 mg/dL Abnormal  Negative Negative   Glucose, UA  Negative mg/dL Negative Negative R Negative Negative Negative Negative Negative   Ketones, UA  Negative Negative Trace Abnormal  Negative Negative 15 mg/dL Abnormal   Trace Abnormal  15 mg/dL Abnormal    Urobilinogen, UA  Normal, 0.2 E.U./dL Normal 1.0 E.U./dL R Normal 0.2 E.U./dL Normal Normal Normal   Bilirubin  Negative Negative Negative CM Negative Negative Small (1+) Abnormal  Small (1+) Abnormal  Negative   Blood, UA  Negative Trace Abnormal  Trace Abnormal  Moderate Abnormal  Trace Abnormal  Small Abnormal  Small Abnormal  Moderate Abnormal    Lot Number [id number]  [id number] 402,[id number]   Expiration Date 9/2,026  7/30/25 10/2,025 7/30/25 2,025/7 2/28/25   Resulting Agency [facility] LABORATORY  [facility]            DEXA Bone Density Axial  Result Date: 5/16/2025  Impression: Osteoporosis. Report dictated by: Lindsey Valdemar  I have personally reviewed this case and agree with the findings above: Electronically Signed: Adam Rosario MD  5/16/2025 4:14 PM EDT  Workstation ID: [id number]    XR Abdomen KUB  Result Date: 2/26/2025  Impression: 2-3 mm left lower pole renal calculi. Electronically Signed: Que Lazar MD  2/26/2025 3:16 PM EST  Workstation ID: [id number]    CT Abdomen Pelvis With & Without Contrast  Result Date: 2/13/2025  Impression: 1.No acute process identified within abdomen/pelvis. 2.Nonobstructing left renal stones. Electronically Signed: Bang Pugh MD  2/13/2025 1:42 PM EST  Workstation ID: [id number]        The above data has been reviewed by SOFIA Grullon 05/21/2025 13:01 EDT.          Patient Care Team:  Mady Mcnally APRN as PCP - General (Family Medicine)  Arminda Arteaga APRN as Nurse Practitioner (Pain Medicine)  Adam Helms MD as Consulting Physician (Cardiology)  Franco Landa PA-C as Physician Assistant (Physician Assistant)  Carlo Drew Jr., MD as Consulting Physician (Pain Medicine)  Sergio Wilkins PA as Physician Assistant (Orthopedic Surgery)  Jose Beckwith MD as Consulting Physician (Orthopedic Surgery)  Adam Helms MD as Consulting  Physician (Cardiology)            ASSESSMENT & PLAN    Diagnoses and all orders for this visit:    1. Acute cystitis with hematuria (Primary)  -     amoxicillin-clavulanate (AUGMENTIN) 875-125 MG per tablet; Take 1 tablet by mouth 2 (Two) Times a Day.  Dispense: 20 tablet; Refill: 0  -     Urine Culture - Urine, Urine, Clean Catch; Future    2. Urinary frequency  -     POCT urinalysis dipstick, automated         Tobacco Use: High Risk (5/21/2025)    Patient History     Smoking Tobacco Use: Every Day     Smokeless Tobacco Use: Never     Passive Exposure: Never       Follow Up     Return if symptoms worsen or fail to improve.      Patient was given instructions and counseling regarding her condition or for health maintenance advice. Please see specific information pulled into the AVS if appropriate.   I have reviewed information obtained and documented by others and I have confirmed the accuracy of this documented note.    SOFIA Grullon

## 2025-05-22 ENCOUNTER — TELEPHONE (OUTPATIENT)
Dept: CARDIOLOGY | Facility: CLINIC | Age: 60
End: 2025-05-22
Payer: COMMERCIAL

## 2025-05-22 NOTE — TELEPHONE ENCOUNTER
REQUEST FOR CARDIAC CLEARANCE    Caller name: Lindsey Mckinnon     Phone Number:   Telephone Information:   Mobile [phone]     Surgeon's name: DR. COTTON WITH [facility]    Type of planned surgery: INTERCEPT PROCEDURE TO SPINE (A FORM OF ABLATION)    Date of planned surgery: WAITING ON CLEARANCE    Type of anesthesia: WILL USE ANESTHESIA  WILL BE UNDER FOR 1 HOUR    Have you been experiencing chest pain or shortness of breath? NO    Is your doctor requesting for you to stop any of your medications prior to your surgery? UNSURE    Where should we fax the clearance to? [facility]      PLEASE CALL TO LET PATIENT KNOW WHEN THIS IS SENT OVER

## 2025-05-23 ENCOUNTER — RESULTS FOLLOW-UP (OUTPATIENT)
Dept: FAMILY MEDICINE CLINIC | Facility: CLINIC | Age: 60
End: 2025-05-23
Payer: COMMERCIAL

## 2025-05-23 LAB — BACTERIA SPEC AEROBE CULT: ABNORMAL

## 2025-05-23 NOTE — TELEPHONE ENCOUNTER
Patient informed and voiced understanding of lab results.    Call patient-urine culture positive, susceptible to augmentin, pt needs to complete antibiotic as prescribed at [facility].

## 2025-05-30 ENCOUNTER — OFFICE VISIT (OUTPATIENT)
Dept: CARDIOLOGY | Facility: CLINIC | Age: 60
End: 2025-05-30
Payer: COMMERCIAL

## 2025-05-30 VITALS
WEIGHT: 255 LBS | DIASTOLIC BLOOD PRESSURE: 65 MMHG | BODY MASS INDEX: 42.49 KG/M2 | HEART RATE: 90 BPM | HEIGHT: 65 IN | SYSTOLIC BLOOD PRESSURE: 112 MMHG

## 2025-05-30 DIAGNOSIS — I25.10 CORONARY ARTERY DISEASE INVOLVING NATIVE CORONARY ARTERY OF NATIVE HEART WITHOUT ANGINA PECTORIS: Primary | ICD-10-CM

## 2025-05-30 DIAGNOSIS — I25.5 ISCHEMIC CARDIOMYOPATHY: ICD-10-CM

## 2025-05-30 DIAGNOSIS — E78.2 MIXED HYPERLIPIDEMIA: ICD-10-CM

## 2025-05-30 DIAGNOSIS — I10 ESSENTIAL HYPERTENSION: ICD-10-CM

## 2025-05-30 PROCEDURE — 99214 OFFICE O/P EST MOD 30 MIN: CPT | Performed by: FAMILY MEDICINE

## 2025-05-30 PROCEDURE — 93000 ELECTROCARDIOGRAM COMPLETE: CPT | Performed by: FAMILY MEDICINE

## 2025-05-30 PROCEDURE — 3078F DIAST BP <80 MM HG: CPT | Performed by: FAMILY MEDICINE

## 2025-05-30 PROCEDURE — 3074F SYST BP LT 130 MM HG: CPT | Performed by: FAMILY MEDICINE

## 2025-05-30 NOTE — PROGRESS NOTES
Chief Complaint  Follow-up and sx clearence    Subjective        History of Present Illness  Lindsey Mckinnon presents to [facility] CARDIOLOGY   Ms. Mckinnon is a 60-year-old female coming in today for routine cardiac follow-up, preoperative surgical clearance.  She is doing well, and has no concerns today.  Specifically she has no complaints of chest pain, shortness of breath, or palpitations          Past History:     Coronary artery disease : Index presentation acute inferior STEMI on 8/25/2022, status post primary angioplasty and stent placement to mid right coronary artery.  Procedure was complicated by a recurrent ventricular tachycardia, intubation.     Essential hypertension  Mixed hyperlipidemia  Chronic obstructive pulm disease  Chronic pain syndrome    Past Medical History:   Diagnosis Date    Acute ST elevation myocardial infarction (STEMI) involving right coronary artery 08/25/2022    Alcoholism     Anemia     Anxiety     Arthritis     Asthma     Bipolar disorder     Breast lump     Cervical spine pain     Chronic allergic rhinitis     Chronic pain disorder     COPD (chronic obstructive pulmonary disease) 04/15/2021    Coronary artery disease     Deep vein thrombosis     Degenerative disc disease, cervical     Depression     Esophageal reflux     Essential hypertension 04/15/2021    Fibromyalgia     Forgetfulness     Gall stones     GERD (gastroesophageal reflux disease)     H/O emphysema     Head injury     Heart attack 08/2022    Hemorrhoid     Hernia cerebri     Hernia, hiatal     Herniated disc, cervical     Hyperlipidemia 04/15/2021    Kidney stone     Limb swelling     Lumbar pain     Lumbosacral disc disease     Migraine     Mood disorder     Muscle cramps     Nicotine dependence 04/15/2021    Peripheral neuropathy     Reflux esophagitis     Shortness of breath     Spinal stenosis     STEMI (ST elevation myocardial infarction) 08/25/2022    Thoracic disc disorder      Tremor 04/15/2021       Allergies   Allergen Reactions    Morphine Itching    Sulfa Antibiotics Nausea And Vomiting    Atorvastatin Myalgia        Past Surgical History:   Procedure Laterality Date    CARDIAC CATHETERIZATION N/A 2022    Procedure: Left Heart Cath;  Surgeon: Adam Helms MD;  Location: [facility] CATH INVASIVE LOCATION;  Service: Cardiovascular;  Laterality: N/A;    CAROTID STENT      CERVICAL EPIDURAL      STERIOD INJECTIONS      SECTION      X 2     SECTION  ,    CHOLECYSTECTOMY      ENDOSCOPY      EPIDURAL BLOCK      GALLBLADDER SURGERY      HYSTERECTOMY      HYSTERECTOMY      LUMBAR EPIDURAL INJECTION      STERIOD    TRIGGER POINT INJECTION      VASCULAR SURGERY          Social History  She  reports that she has been smoking cigarettes. She has a 7.5 pack-year smoking history. She has never been exposed to tobacco smoke. She has never used smokeless tobacco. She reports that she does not currently use alcohol. She reports that she does not use drugs.    Family History  Her family history includes Arthritis in her brother, father, mother, and son; Bipolar disorder in her mother; Cancer in her father and mother; Depression in her brother and mother; Diabetes in her brother, father, and mother; Heart attack in her father; Heart disease in her father; Kidney cancer in her brother and father; Lung cancer in her father and mother; OCD in her brother and brother; Osteoporosis in her mother; Paranoid behavior in her brother.       Current Outpatient Medications on File Prior to Visit   Medication Sig    albuterol (ACCUNEB) 0.63 MG/3ML nebulizer solution Take 3 mL by nebulization Every 6 (Six) Hours As Needed for Wheezing.    diclofenac (VOLTAREN) 50 MG EC tablet Take 1 tablet by mouth 2 (Two) Times a Day.    estradiol (ESTRACE) 0.1 MG/GM vaginal cream Apply 0.5gm  inside the vagina and rub in and 0.5gm around the vestibule and massage and as well as around urethra.  " 3 times a week at night    Fluticasone-Umeclidin-Vilant (Trelegy Ellipta) 100-62.5-25 MCG/ACT inhaler INHALE 1 PUFF BY MOUTH ONCE DAILY    levothyroxine (SYNTHROID, LEVOTHROID) 25 MCG tablet Take 1 tablet by mouth Every Morning.    loratadine (CLARITIN) 10 MG tablet Take 1 tablet by mouth Daily.    losartan (Cozaar) 100 MG tablet Take 1 tablet by mouth Daily.    metaxalone (SKELAXIN) 800 MG tablet Take 1 tablet by mouth 3 (Three) Times a Day As Needed.    oxyCODONE ER (oxyCONTIN) 15 MG tablet extended-release 12 hour Take 1 tablet by mouth Every 12 (Twelve) Hours.    RABEprazole (ACIPHEX) 20 MG EC tablet Take 1 tablet by mouth Daily.    sertraline (ZOLOFT) 100 MG tablet Take 1.5 tablets by mouth Every Night.    Vibegron 75 MG tablet Take 1 tablet by mouth Daily.    vitamin D (ERGOCALCIFEROL) 1.25 MG (18238 UT) capsule capsule TAKE 1 CAPSULE BY MOUTH 1 TIME EVERY WEEK     No current facility-administered medications on file prior to visit.         Review of Systems   Constitutional:  Negative for fatigue.   Respiratory:  Negative for cough, chest tightness and shortness of breath.    Cardiovascular:  Negative for chest pain, palpitations and leg swelling.   Gastrointestinal:  Negative for nausea and vomiting.   Musculoskeletal:  Positive for arthralgias and back pain.   Neurological:  Negative for dizziness and syncope.        Objective   Vitals:    05/30/25 1040   BP: 112/65   Pulse: 90   Weight: 116 kg (255 lb)   Height: 165.1 cm (65\")         Physical Exam  General : Alert, awake, no acute distress  Neck : Supple, no carotid bruit, no jugular venous distention  CVS : Regular rate and rhythm, no murmur, no rubs or gallops  Lungs: Clear to auscultation bilaterally, no crackles or rhonchi  Abdomen: Soft, nontender, bowel sounds active  Extremities: Warm, well-perfused, no pedal edema      Result Review     The following data was reviewed by SOFIA Burnham  proBNP   Date Value Ref Range Status   08/25/2022 45.6 " "0.0 - 900.0 pg/mL Final     CMP          8/5/2024    09:31 1/31/2025    10:19   CMP   Glucose 83  78    BUN 9  10    Creatinine 0.86  0.97    EGFR 77.9  67.5    Sodium 138  137    Potassium 4.0  4.0    Chloride 103  104    Calcium 9.3  9.6    Total Protein 7.1  7.0    Albumin 3.9  3.7    Globulin 3.2  3.3    Total Bilirubin 0.2  <0.2    Alkaline Phosphatase 77  88    AST (SGOT) 17  14    ALT (SGPT) 14  14    Albumin/Globulin Ratio 1.2  1.1    BUN/Creatinine Ratio 10.5  10.3    Anion Gap 10.9  9.1      CBC w/diff          1/31/2025    10:19 4/2/2025    09:54 5/13/2025    09:27   CBC w/Diff   WBC 14.08  12.19  11.72    RBC 4.81  4.80  4.61    Hemoglobin 14.9  15.4  14.9    Hematocrit 46.3  44.7  44.1    MCV 96.3  93.1  95.7    MCH 31.0  32.1  32.3    MCHC 32.2  34.5  33.8    RDW 12.7  13.5  13.1    Platelets 311  334  312    Neutrophil Rel % 64.9  62.1  65.2    Immature Granulocyte Rel % 0.6  0.6  0.6    Lymphocyte Rel % 25.3  27.7  25.3    Monocyte Rel % 7.7  6.5  6.6    Eosinophil Rel % 0.9  2.5  1.8    Basophil Rel % 0.6  0.6  0.5       Lab Results   Component Value Date    TSH 1.150 05/13/2025      Lab Results   Component Value Date    FREET4 1.11 05/13/2025      No results found for: \"DDIMERQUANT\"  Magnesium   Date Value Ref Range Status   08/27/2022 2.0 1.6 - 2.6 mg/dL Final      No results found for: \"DIGOXIN\"   Lab Results   Component Value Date    TROPONINT 2.050 (C) 08/27/2022           Lipid Panel          1/31/2025    10:19   Lipid Panel   Total Cholesterol 224    Triglycerides 147    HDL Cholesterol 42    VLDL Cholesterol 27    LDL Cholesterol  155    LDL/HDL Ratio 3.63          Results for orders placed in visit on 10/19/23    Adult Transthoracic Echo Complete W/ Cont if Necessary Per Protocol    Interpretation Summary    Left ventricular systolic function is normal. Left ventricular ejection fraction appears to be 56 - 60%.  There are no obvious regional wall motion abnormalities.    Left ventricular " wall thickness is consistent with mild concentric hypertrophy.    Left ventricular diastolic function is consistent with (grade I) impaired relaxation.    There are no significant valvular abnormalities.    Estimated right ventricular systolic pressure from tricuspid regurgitation is normal (<35 mmHg).        ECG 12 Lead    Date/Time: 5/30/2025 10:51 AM  Performed by: Ashley Reynolds APRN    Authorized by: Ashley Reynolds APRN  Comparison: compared with previous ECG   Rhythm: sinus rhythm  Rate: normal  ST Segments: ST segments normal  QRS axis: normal    Clinical impression: normal ECG              Assessment and Plan   Diagnoses and all orders for this visit:    1. Coronary artery disease involving native coronary artery of native heart without angina pectoris (Primary)  Assessment & Plan:  She is stable without any symptoms of angina.  she had intermittently stopped taking Plavix and aspirin, at this point we will just have her resume a daily 81 mg aspirin.  She may continue beta-blocker and Repatha.      EKG is unremarkable, okay to proceed with upcoming surgical procedure without any further  Orders:  -     ECG 12 Lead    2. Essential hypertension  Assessment & Plan:  Blood pressure is well-controlled, continue losartan      3. Ischemic cardiomyopathy  Assessment & Plan:  She has no signs of volume overload, and previous echocardiogram showed normalization of LVEF without any wall motion abnormalities.  Continue current medications.      4. Mixed hyperlipidemia  Assessment & Plan:    previous LDL above goal, she has since started on Repatha, we will plan to recheck her fasting lipid profile with her next routine lab draw from PCP.      Other orders  -     Evolocumab (REPATHA) solution auto-injector SureClick injection; Inject 1 mL under the skin into the appropriate area as directed Every 14 (Fourteen) Days.  Dispense: 2 mL; Refill: 11                Follow Up   Return in about 1 year (around 5/30/2026) for  with Dr. Helms.    Patient was given instructions and counseling regarding her condition or for health maintenance advice. Please see specific information pulled into the AVS if appropriate.     Signed,  Ashley Reynolds, APRN  05/30/2025     Dictated Utilizing Dragon Dictation: Please note that portions of this note were completed with a voice recognition program.  Part of this note may be an electronic transcription/translation of spoken language to printed text using the Dragon Dictation System.

## 2025-06-04 ENCOUNTER — SPECIALTY PHARMACY (OUTPATIENT)
Dept: CARDIOLOGY | Facility: CLINIC | Age: 60
End: 2025-06-04
Payer: COMMERCIAL

## 2025-06-05 ENCOUNTER — TELEPHONE (OUTPATIENT)
Dept: UROLOGY | Age: 60
End: 2025-06-05
Payer: COMMERCIAL

## 2025-06-05 DIAGNOSIS — R35.0 URINARY FREQUENCY: Primary | ICD-10-CM

## 2025-06-05 NOTE — TELEPHONE ENCOUNTER
Can order a ua micro and cx not sure why a CBC would be indicated for lower urinary tract infection.  She was treated with Augmentin which could cause candidiasis it could be yeast

## 2025-06-05 NOTE — TELEPHONE ENCOUNTER
.        Hub staff attempted to follow warm transfer process and was unsuccessful     Caller: Lindsey Mckinnon    Relationship to patient: Self    Best call back number:   Telephone Information:   Mobile [phone]         Patient is needing: PATIENT CALLED STATING THAT SHE COMPLETED 10 DAY ANTIBIOTIC 1 WEEK AGO AND SHE IS STILL HAVING  THE SYMPTOMS OF BURNING AND URGENCY.  SHE STATES SHE IS CURRENTLY UP 4-5 TIMES A NIGHT. SHE IS REQUESTING A URINE CULTURE AND A CBC IF POSSIBLE.  PLEASE CALL PT TO ADVISE. THANK YOU.

## 2025-06-06 NOTE — TELEPHONE ENCOUNTER
LM for the patient to let her know that Cesilia would like for her to leave another urine sample at the lab. I placed that order in. HUB okay to relay.

## 2025-06-10 ENCOUNTER — TRANSCRIBE ORDERS (OUTPATIENT)
Dept: ADMINISTRATIVE | Facility: HOSPITAL | Age: 60
End: 2025-06-10
Payer: COMMERCIAL

## 2025-06-10 ENCOUNTER — LAB (OUTPATIENT)
Facility: HOSPITAL | Age: 60
End: 2025-06-10
Payer: COMMERCIAL

## 2025-06-10 ENCOUNTER — LAB (OUTPATIENT)
Dept: LAB | Facility: HOSPITAL | Age: 60
End: 2025-06-10
Payer: COMMERCIAL

## 2025-06-10 DIAGNOSIS — Z01.89 LABORATORY PROCEDURE: Primary | ICD-10-CM

## 2025-06-10 DIAGNOSIS — Z01.89 LABORATORY PROCEDURE: ICD-10-CM

## 2025-06-10 DIAGNOSIS — R35.0 URINARY FREQUENCY: ICD-10-CM

## 2025-06-10 LAB
BACTERIA UR QL AUTO: ABNORMAL /HPF
BILIRUB UR QL STRIP: NEGATIVE
CLARITY UR: CLEAR
COLOR UR: YELLOW
GLUCOSE UR STRIP-MCNC: NEGATIVE MG/DL
HGB UR QL STRIP.AUTO: ABNORMAL
HYALINE CASTS UR QL AUTO: ABNORMAL /LPF
KETONES UR QL STRIP: NEGATIVE
LEUKOCYTE ESTERASE UR QL STRIP.AUTO: ABNORMAL
NITRITE UR QL STRIP: NEGATIVE
PH UR STRIP.AUTO: 7 [PH] (ref 5–8)
PROT UR QL STRIP: NEGATIVE
RBC # UR STRIP: ABNORMAL /HPF
REF LAB TEST METHOD: ABNORMAL
SP GR UR STRIP: <=1.005 (ref 1–1.03)
SQUAMOUS #/AREA URNS HPF: ABNORMAL /HPF
UROBILINOGEN UR QL STRIP: ABNORMAL
WBC # UR STRIP: ABNORMAL /HPF

## 2025-06-10 PROCEDURE — 87088 URINE BACTERIA CULTURE: CPT

## 2025-06-10 PROCEDURE — 81001 URINALYSIS AUTO W/SCOPE: CPT

## 2025-06-10 PROCEDURE — 87081 CULTURE SCREEN ONLY: CPT

## 2025-06-10 PROCEDURE — 87186 SC STD MICRODIL/AGAR DIL: CPT

## 2025-06-10 PROCEDURE — 87086 URINE CULTURE/COLONY COUNT: CPT

## 2025-06-11 LAB — MRSA SPEC QL CULT: NORMAL

## 2025-06-12 ENCOUNTER — DOCUMENTATION (OUTPATIENT)
Dept: UROLOGY | Age: 60
End: 2025-06-12
Payer: COMMERCIAL

## 2025-06-12 ENCOUNTER — RESULTS FOLLOW-UP (OUTPATIENT)
Dept: LAB | Facility: HOSPITAL | Age: 60
End: 2025-06-12
Payer: COMMERCIAL

## 2025-06-12 DIAGNOSIS — R35.0 URINARY FREQUENCY: Primary | ICD-10-CM

## 2025-06-12 LAB — BACTERIA SPEC AEROBE CULT: ABNORMAL

## 2025-06-12 RX ORDER — CEFDINIR 300 MG/1
300 CAPSULE ORAL 2 TIMES DAILY
Qty: 20 CAPSULE | Refills: 0 | Status: SHIPPED | OUTPATIENT
Start: 2025-06-12

## 2025-06-12 NOTE — TELEPHONE ENCOUNTER
Called and made pt aware of results, and that medication was sent in to her preferred pharmacy. I told her to call the office if she decided to come in for the rocephin shot that diamond recommended as well.

## 2025-06-13 ENCOUNTER — CLINICAL SUPPORT (OUTPATIENT)
Dept: UROLOGY | Age: 60
End: 2025-06-13
Payer: COMMERCIAL

## 2025-06-13 DIAGNOSIS — R78.81 BACTEREMIA: Primary | ICD-10-CM

## 2025-06-13 RX ORDER — CEFTRIAXONE 1 G/1
1 INJECTION, POWDER, FOR SOLUTION INTRAMUSCULAR; INTRAVENOUS ONCE
Status: COMPLETED | OUTPATIENT
Start: 2025-06-13 | End: 2025-06-13

## 2025-06-13 RX ADMIN — CEFTRIAXONE 1 G: 1 INJECTION, POWDER, FOR SOLUTION INTRAMUSCULAR; INTRAVENOUS at 14:55

## 2025-06-13 NOTE — ASSESSMENT & PLAN NOTE
previous LDL above goal, she has since started on Repatha, we will plan to recheck her fasting lipid profile with her next routine lab draw from PCP.

## 2025-06-13 NOTE — ASSESSMENT & PLAN NOTE
She has no signs of volume overload, and previous echocardiogram showed normalization of LVEF without any wall motion abnormalities.  Continue current medications.

## 2025-06-13 NOTE — PROGRESS NOTES
Pt presented to office for Rocephin injection per APRN recommendation; injection given in right VG; pt tolerated well.

## 2025-06-13 NOTE — ASSESSMENT & PLAN NOTE
She is stable without any symptoms of angina.  she had intermittently stopped taking Plavix and aspirin, at this point we will just have her resume a daily 81 mg aspirin.  She may continue beta-blocker and Repatha.
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
## 2025-06-30 DIAGNOSIS — F41.9 ANXIETY: ICD-10-CM

## 2025-06-30 DIAGNOSIS — F32.0 CURRENT MILD EPISODE OF MAJOR DEPRESSIVE DISORDER WITHOUT PRIOR EPISODE: ICD-10-CM

## 2025-07-01 ENCOUNTER — OFFICE VISIT (OUTPATIENT)
Dept: FAMILY MEDICINE CLINIC | Facility: CLINIC | Age: 60
End: 2025-07-01
Payer: COMMERCIAL

## 2025-07-01 VITALS
HEART RATE: 96 BPM | HEIGHT: 65 IN | WEIGHT: 256.4 LBS | SYSTOLIC BLOOD PRESSURE: 136 MMHG | BODY MASS INDEX: 42.72 KG/M2 | OXYGEN SATURATION: 96 % | TEMPERATURE: 97.4 F | DIASTOLIC BLOOD PRESSURE: 82 MMHG

## 2025-07-01 DIAGNOSIS — L30.8 PRURITIC DERMATITIS: ICD-10-CM

## 2025-07-01 DIAGNOSIS — R21 RASH: Primary | ICD-10-CM

## 2025-07-01 PROCEDURE — 1125F AMNT PAIN NOTED PAIN PRSNT: CPT | Performed by: NURSE PRACTITIONER

## 2025-07-01 PROCEDURE — 1159F MED LIST DOCD IN RCRD: CPT | Performed by: NURSE PRACTITIONER

## 2025-07-01 PROCEDURE — 3079F DIAST BP 80-89 MM HG: CPT | Performed by: NURSE PRACTITIONER

## 2025-07-01 PROCEDURE — 3075F SYST BP GE 130 - 139MM HG: CPT | Performed by: NURSE PRACTITIONER

## 2025-07-01 PROCEDURE — 1160F RVW MEDS BY RX/DR IN RCRD: CPT | Performed by: NURSE PRACTITIONER

## 2025-07-01 PROCEDURE — 96372 THER/PROPH/DIAG INJ SC/IM: CPT | Performed by: NURSE PRACTITIONER

## 2025-07-01 PROCEDURE — 99213 OFFICE O/P EST LOW 20 MIN: CPT | Performed by: NURSE PRACTITIONER

## 2025-07-01 RX ORDER — HYDROXYZINE HYDROCHLORIDE 25 MG/1
25 TABLET, FILM COATED ORAL 2 TIMES DAILY
Qty: 60 TABLET | Refills: 0 | Status: SHIPPED | OUTPATIENT
Start: 2025-07-01

## 2025-07-01 RX ORDER — TRIAMCINOLONE ACETONIDE 40 MG/ML
40 INJECTION, SUSPENSION INTRA-ARTICULAR; INTRAMUSCULAR ONCE
Status: COMPLETED | OUTPATIENT
Start: 2025-07-01 | End: 2025-07-01

## 2025-07-01 RX ORDER — SERTRALINE HYDROCHLORIDE 100 MG/1
150 TABLET, FILM COATED ORAL NIGHTLY
Qty: 135 TABLET | Refills: 1 | Status: SHIPPED | OUTPATIENT
Start: 2025-07-01

## 2025-07-01 RX ADMIN — TRIAMCINOLONE ACETONIDE 40 MG: 40 INJECTION, SUSPENSION INTRA-ARTICULAR; INTRAMUSCULAR at 12:31

## 2025-07-01 NOTE — PROGRESS NOTES
Chief Complaint  Rash    SUBJECTIVE  Lindsey Mckinnon presents to [facility]    Patient complaining of itching rash on neck, face, into hair, shoulders X 5 days.  Rash is red and raised.  Patient states the itching is intermittent.  Patient states when she gets warm the rash burns.  Patient denies soaps, detergent, medications, foods, drinks.    History of Present Illness  Past Medical History:   Diagnosis Date    Acute ST elevation myocardial infarction (STEMI) involving right coronary artery 08/25/2022    Alcoholism     Anemia     Anxiety     Arthritis     Asthma     Bipolar disorder     Breast lump     Cervical spine pain     Chronic allergic rhinitis     Chronic pain disorder     COPD (chronic obstructive pulmonary disease) 04/15/2021    Coronary artery disease     Deep vein thrombosis     Degenerative disc disease, cervical     Depression     Esophageal reflux     Essential hypertension 04/15/2021    Fibromyalgia     Forgetfulness     Gall stones     GERD (gastroesophageal reflux disease)     H/O emphysema     Head injury     Heart attack 08/2022    Hemorrhoid     Hernia cerebri     Hernia, hiatal     Herniated disc, cervical     Hyperlipidemia 04/15/2021    Kidney stone     Limb swelling     Lumbar pain     Lumbosacral disc disease     Migraine     Mood disorder     Muscle cramps     Nicotine dependence 04/15/2021    Peripheral neuropathy     Reflux esophagitis     Shortness of breath     Spinal stenosis     STEMI (ST elevation myocardial infarction) 08/25/2022    Thoracic disc disorder     Tremor 04/15/2021      Family History   Problem Relation Age of Onset    Depression Mother     Bipolar disorder Mother     Lung cancer Mother         MALIGNANT    Diabetes Mother         UNSPECIFIED TYPE    Cancer Mother         BLADDER    Osteoporosis Mother     Arthritis Mother     Lung cancer Father         MALIGNANT    Diabetes Father         UNSPECIFIED TYPE/ MELLITUS    Heart  attack Father     Kidney cancer Father     Cancer Father         BLADDER    Arthritis Father     Heart disease Father     OCD Brother     Depression Brother     Diabetes Brother         UNSPECIFIED TYPE/MELLITUS    Kidney cancer Brother     Arthritis Brother     Paranoid behavior Brother     OCD Brother     Arthritis Son       Past Surgical History:   Procedure Laterality Date    CARDIAC CATHETERIZATION N/A 2022    Procedure: Left Heart Cath;  Surgeon: Adam Helms MD;  Location: [facility] CATH INVASIVE LOCATION;  Service: Cardiovascular;  Laterality: N/A;    CAROTID STENT      CERVICAL EPIDURAL      STERIOD INJECTIONS      SECTION      X 2     SECTION  ,    CHOLECYSTECTOMY      ENDOSCOPY      EPIDURAL BLOCK      GALLBLADDER SURGERY      HYSTERECTOMY      HYSTERECTOMY      LUMBAR EPIDURAL INJECTION      STERIOD    TRIGGER POINT INJECTION      VASCULAR SURGERY          Current Outpatient Medications:     albuterol (ACCUNEB) 0.63 MG/3ML nebulizer solution, Take 3 mL by nebulization Every 6 (Six) Hours As Needed for Wheezing., Disp: 3 mL, Rfl: 12    diclofenac (VOLTAREN) 50 MG EC tablet, Take 1 tablet by mouth 2 (Two) Times a Day., Disp: 60 tablet, Rfl: 1    Evolocumab (REPATHA) solution auto-injector SureClick injection, Inject 1 mL under the skin into the appropriate area as directed Every 14 (Fourteen) Days., Disp: 2 mL, Rfl: 11    Fluticasone-Umeclidin-Vilant (Trelegy Ellipta) 100-62.5-25 MCG/ACT inhaler, INHALE 1 PUFF BY MOUTH ONCE DAILY, Disp: 60 each, Rfl: 1    levothyroxine (SYNTHROID, LEVOTHROID) 25 MCG tablet, Take 1 tablet by mouth Every Morning., Disp: 90 tablet, Rfl: 0    loratadine (CLARITIN) 10 MG tablet, Take 1 tablet by mouth Daily., Disp: 90 tablet, Rfl: 1    losartan (Cozaar) 100 MG tablet, Take 1 tablet by mouth Daily., Disp: 90 tablet, Rfl: 1    metaxalone (SKELAXIN) 800 MG tablet, Take 1 tablet by mouth 3 (Three) Times a Day As Needed., Disp: , Rfl:      "oxyCODONE ER (oxyCONTIN) 15 MG tablet extended-release 12 hour, Take 1 tablet by mouth Every 12 (Twelve) Hours., Disp: , Rfl:     RABEprazole (ACIPHEX) 20 MG EC tablet, Take 1 tablet by mouth Daily., Disp: 90 tablet, Rfl: 1    sertraline (ZOLOFT) 100 MG tablet, TAKE 1 AND 1/2 TABLETS BY MOUTH EVERY NIGHT, Disp: 135 tablet, Rfl: 1    vitamin D (ERGOCALCIFEROL) 1.25 MG (80142 UT) capsule capsule, TAKE 1 CAPSULE BY MOUTH 1 TIME EVERY WEEK, Disp: 12 capsule, Rfl: 1    hydrOXYzine (ATARAX) 25 MG tablet, Take 1 tablet by mouth 2 (Two) Times a Day., Disp: 60 tablet, Rfl: 0  No current facility-administered medications for this visit.    OBJECTIVE  Vital Signs:   /82 (BP Location: Left arm, Patient Position: Sitting, Cuff Size: Large Adult)   Pulse 96   Temp 97.4 °F (36.3 °C) (Temporal)   Ht 165.1 cm (65\")   Wt 116 kg (256 lb 6.4 oz)   SpO2 96%   BMI 42.67 kg/m²    Estimated body mass index is 42.67 kg/m² as calculated from the following:    Height as of this encounter: 165.1 cm (65\").    Weight as of this encounter: 116 kg (256 lb 6.4 oz).     Wt Readings from Last 3 Encounters:   07/01/25 116 kg (256 lb 6.4 oz)   05/30/25 116 kg (255 lb)   05/21/25 100 kg (220 lb 9.6 oz)     BP Readings from Last 3 Encounters:   07/01/25 136/82   05/30/25 112/65   05/21/25 137/84       Physical Exam  Vitals reviewed.   Constitutional:       Appearance: Normal appearance. She is well-developed.   HENT:      Head: Normocephalic and atraumatic.      Right Ear: External ear normal.      Left Ear: External ear normal.      Mouth/Throat:      Pharynx: No oropharyngeal exudate.   Eyes:      Conjunctiva/sclera: Conjunctivae normal.      Pupils: Pupils are equal, round, and reactive to light.   Cardiovascular:      Rate and Rhythm: Normal rate and regular rhythm.      Pulses: Normal pulses.      Heart sounds: Normal heart sounds. No murmur heard.     No friction rub. No gallop.   Pulmonary:      Effort: Pulmonary effort is normal.      " Breath sounds: Normal breath sounds. No wheezing or rhonchi.   Skin:     General: Skin is warm and dry.          Neurological:      Mental Status: She is alert and oriented to person, place, and time.      Cranial Nerves: No cranial nerve deficit.   Psychiatric:         Mood and Affect: Mood and affect normal.         Behavior: Behavior normal.         Thought Content: Thought content normal.         Judgment: Judgment normal.          Result Review        DEXA Bone Density Axial  Result Date: 5/16/2025  Impression: Osteoporosis. Report dictated by: Lindsey Aldrich  I have personally reviewed this case and agree with the findings above: Electronically Signed: Adam Rosario MD  5/16/2025 4:14 PM EDT  Workstation ID: [id number]        The above data has been reviewed by SOFIA Grullon 07/01/2025 11:30 EDT.          Patient Care Team:  Mady Mcnally APRN as PCP - General (Family Medicine)  Arminda Arteaga APRN as Nurse Practitioner (Pain Medicine)  Adam Helms MD as Consulting Physician (Cardiology)  Franco Landa PA-C as Physician Assistant (Physician Assistant)  Carlo Drew Jr., MD as Consulting Physician (Pain Medicine)  Sergio Wilkins PA as Physician Assistant (Orthopedic Surgery)  Jose Beckwith MD as Consulting Physician (Orthopedic Surgery)  Adam Helms MD as Consulting Physician (Cardiology)            ASSESSMENT & PLAN    Diagnoses and all orders for this visit:    1. Rash (Primary)  Comments:  kenalog imjection 40mg IM given in office, recommend dermboro solution, hydroxyzine prn itching  Orders:  -     triamcinolone acetonide (KENALOG-40) injection 40 mg    2. Pruritic dermatitis  Comments:  kenalog imjection 40mg IM given in office, recommend dermboro solution, hydroxyzine prn itching  Orders:  -     hydrOXYzine (ATARAX) 25 MG tablet; Take 1 tablet by mouth 2 (Two) Times a Day.  Dispense: 60 tablet; Refill: 0         Tobacco Use: High Risk (7/1/2025)     Patient History     Smoking Tobacco Use: Every Day     Smokeless Tobacco Use: Never     Passive Exposure: Never       Follow Up     Return if symptoms worsen or fail to improve.      Patient was given instructions and counseling regarding her condition or for health maintenance advice. Please see specific information pulled into the AVS if appropriate.   I have reviewed information obtained and documented by others and I have confirmed the accuracy of this documented note.    Mady Mcnally, APRN

## 2025-07-08 ENCOUNTER — OFFICE VISIT (OUTPATIENT)
Dept: UROLOGY | Age: 60
End: 2025-07-08
Payer: COMMERCIAL

## 2025-07-08 VITALS — BODY MASS INDEX: 42.61 KG/M2 | HEIGHT: 65 IN | WEIGHT: 255.73 LBS | RESPIRATION RATE: 18 BRPM

## 2025-07-08 DIAGNOSIS — N39.0 RECURRENT UTI: ICD-10-CM

## 2025-07-08 DIAGNOSIS — N95.8 GENITOURINARY SYNDROME OF MENOPAUSE: ICD-10-CM

## 2025-07-08 DIAGNOSIS — R31.29 MICROHEMATURIA: Primary | ICD-10-CM

## 2025-07-08 PROBLEM — N63.0 BREAST LUMP: Status: RESOLVED | Noted: 2021-08-15 | Resolved: 2025-07-08

## 2025-07-08 PROBLEM — M54.51 VERTEBROGENIC LOW BACK PAIN: Status: ACTIVE | Noted: 2025-07-08

## 2025-07-08 LAB
BILIRUB BLD-MCNC: NEGATIVE MG/DL
CLARITY, POC: CLEAR
COLOR UR: YELLOW
EXPIRATION DATE: ABNORMAL
GLUCOSE UR STRIP-MCNC: NEGATIVE MG/DL
KETONES UR QL: NEGATIVE
LEUKOCYTE EST, POC: NEGATIVE
Lab: ABNORMAL
NITRITE UR-MCNC: POSITIVE MG/ML
PH UR: 5.5 [PH] (ref 5–8)
PROT UR STRIP-MCNC: ABNORMAL MG/DL
RBC # UR STRIP: ABNORMAL /UL
SP GR UR: 1.02 (ref 1–1.03)
URINE VOLUME: 0
UROBILINOGEN UR QL: ABNORMAL

## 2025-07-08 RX ORDER — ESTRADIOL 0.1 MG/G
CREAM VAGINAL
Qty: 42.5 G | Refills: 6 | Status: SHIPPED | OUTPATIENT
Start: 2025-07-08

## 2025-07-08 RX ORDER — METHENAMINE HIPPURATE 1000 MG/1
1 TABLET ORAL 2 TIMES DAILY WITH MEALS
Qty: 180 TABLET | Refills: 3 | Status: SHIPPED | OUTPATIENT
Start: 2025-07-08

## 2025-07-08 NOTE — PROGRESS NOTES
"Chief Complaint: Follow-Up (Frequency, \"Pink on tissue paper\") and microhematuria    Subjective         History of Present Illness  Lindsey Mckinnon is a 60 y.o. female presents to [facility] to be seen for follow-up hematuria and urine odor.      Since we last saw the patient it appears that she has had x 4 urine cultures performed.    All of these have been performed by her primary care provider with the exception of her last one performed on 6/10/2025.    It does appear that her urine cultures are all E. coli greater than 100,000 colony-forming units per milliliter that are generally pansensitive with the exception of a few of them being resistant to Bactrim.    She is often times being treated with Rocephin and Augmentin as well as nitrofurantoin.    At last visit we started patient on Gemtesa to see if this would help to alleviate her urinary urgency and frequency and also recommended initiation of vaginal estrogen cream with consistent use.    Today the patient reports that she is no longer taking Gemtesa or estrace.  It was reported to be discontinued on 7/1/2025 as the patient was not taking it.    Most recent urine culture from 6/12/2025 she was given cefdinir and given Rocephin injection.      I did discuss with the patient that I did believe some of her recurrent UTIs may be related to narrow spectrum antibiotics that did not penetrate into the upper urinary tracts and that I believed that potentially she may be colonized with E. coli rather than E. coli being the cause of her symptoms.  The patient did specifically request to have a CBC ordered for her UTI symptoms but i did not feel that that was indicated.    It does appear that the patient has intermittently had an elevated white count during times of potential urinary tract infections.    Urine today continues to be nitrite positive with small blood and negative leukocytes.    She reports that she is still with urinary " "frequency and reports \"pink on the toilet tissue\" when she wipes.    Patient underwent cystoscopy for gross hematuria in August 2021 with Dr. Renteria.    The patient's CT scan of the abdomen and pelvis with without contrast from 2/13/2025 reveals no acute processes identified within the abdomen or pelvis and she was only noted to have nonobstructing left renal stones.    She states that she usually has no change in symptoms even on antibiotics.    She states that she didn't see any change in symptoms with samples of gemtesa so she has not been taking this.     She has not used the estrace in about a month.     She has only ever used this for about 3-4 month at a time.     She does smoke around a 1/2 ppd.    She drinks only water drinks 1/2 cup of coffee daily and tea and coke with dinner.    Previous:    At last visit we ordered a UA micro and PCR culture to be performed.    The patient had 0-2 red blood cells per high-powered field noted on last UA micro and her PCR culture returned positive for Aerococcus, Enterococcus, E. coli and she was treated based on susceptibility.    She took 7 of the 10 days of atbx and kept the rest in case of a UTI.     She states that she did take another 3 days worth of antibiotics for \"discomfort\" since we last saw her.    We started the patient on Estrace for GSM.    We have had no further correspondence in regards to UTI symptoms since we last saw her.    She states he has continued to have Uti symptoms.    The patient saw her PCP 1/29/2025 for a follow-up appointment.  Her UA in office at that time revealed small leukocytes 30 mg/dL of protein 15 mg/dL of ketones small bilirubin and small blood.    Urine culture was sent which was subsequently negative for significant bacterial growth consistent with a UTI.    Nocturia x every hour and \"left kidney pain\"     She states she has significant urinary and frequency and is worse during the day.    PVR 0      Previous:      Patient " "tolerated well with no immediate complications.  Patient last seen by myself in September 2021 for hematuria.    Patient was placed on tamsulosin by myself for straining to urinate and was to follow-up in 4 weeks.    Patient was referred back to urology from PCP office after complaining of urinary urgency frequency and dysuria and noted to have moderate blood on dipstick only.    Patient subsequent urine culture was negative for bacterial growth.    No UA micro had been performed.    Patient was referred over to urology as she was \"still having pain and uncomfortable\" and was noted to have hematuria.    Patient had cystoscopy with Dr. Renteria in 8 of 2021 which revealed no identifiable etiology for hematuria. Dr. Fajardo recommended repeat w/u in 5 years unless she is with     She states foul urine odor most of the time.     She state she voids frequently but she states she drinks a lot of water.    She states she has a discomfort \" on the outside at times\".     She states azo helps her symptoms at times.     She states she feels her urine stream is slow at times.     She states urgency with urination.    No family hx of  malignancies.     She is a smoker.      Objective     Past Medical History:   Diagnosis Date    Acute ST elevation myocardial infarction (STEMI) involving right coronary artery 08/25/2022    Alcoholism     Anemia     Anxiety     Arthritis     Asthma     Bipolar disorder     Breast lump     Cervical spine pain     Chronic allergic rhinitis     Chronic pain disorder     COPD (chronic obstructive pulmonary disease) 04/15/2021    Coronary artery disease     Deep vein thrombosis     Degenerative disc disease, cervical     Depression     Esophageal reflux     Essential hypertension 04/15/2021    Fibromyalgia     Forgetfulness     Gall stones     GERD (gastroesophageal reflux disease)     H/O emphysema     Head injury     Heart attack 08/2022    Hemorrhoid     Hernia cerebri     Hernia, hiatal     " Herniated disc, cervical     Hyperlipidemia 04/15/2021    Kidney stone     Limb swelling     Lumbar pain     Lumbosacral disc disease     Migraine     Mood disorder     Muscle cramps     Nicotine dependence 04/15/2021    Peripheral neuropathy     Reflux esophagitis     Shortness of breath     Spinal stenosis     STEMI (ST elevation myocardial infarction) 2022    Thoracic disc disorder     Tremor 04/15/2021       Past Surgical History:   Procedure Laterality Date    CARDIAC CATHETERIZATION N/A 2022    Procedure: Left Heart Cath;  Surgeon: Adam Helms MD;  Location: [facility] CATH INVASIVE LOCATION;  Service: Cardiovascular;  Laterality: N/A;    CAROTID STENT      CERVICAL EPIDURAL      STERIOD INJECTIONS      SECTION      X 2     SECTION  ,    CHOLECYSTECTOMY      ENDOSCOPY      EPIDURAL BLOCK      GALLBLADDER SURGERY      HYSTERECTOMY      HYSTERECTOMY      LUMBAR EPIDURAL INJECTION      STERIOD    TRIGGER POINT INJECTION      VASCULAR SURGERY           Current Outpatient Medications:     albuterol (ACCUNEB) 0.63 MG/3ML nebulizer solution, Take 3 mL by nebulization Every 6 (Six) Hours As Needed for Wheezing., Disp: 3 mL, Rfl: 12    diclofenac (VOLTAREN) 50 MG EC tablet, Take 1 tablet by mouth 2 (Two) Times a Day., Disp: 60 tablet, Rfl: 1    estradiol (ESTRACE) 0.1 MG/GM vaginal cream, Apply 0.5gm  inside the vagina and rub in and 0.5gm around the vestibule and massage and as well as around urethra.  3 times a week at night, Disp: 42.5 g, Rfl: 6    Evolocumab (REPATHA) solution auto-injector SureClick injection, Inject 1 mL under the skin into the appropriate area as directed Every 14 (Fourteen) Days., Disp: 2 mL, Rfl: 11    Fluticasone-Umeclidin-Vilant (Trelegy Ellipta) 100-62.5-25 MCG/ACT inhaler, INHALE 1 PUFF BY MOUTH ONCE DAILY, Disp: 60 each, Rfl: 1    hydrOXYzine (ATARAX) 25 MG tablet, Take 1 tablet by mouth 2 (Two) Times a Day., Disp: 60 tablet, Rfl: 0     levothyroxine (SYNTHROID, LEVOTHROID) 25 MCG tablet, Take 1 tablet by mouth Every Morning., Disp: 90 tablet, Rfl: 0    loratadine (CLARITIN) 10 MG tablet, Take 1 tablet by mouth Daily., Disp: 90 tablet, Rfl: 1    losartan (Cozaar) 100 MG tablet, Take 1 tablet by mouth Daily., Disp: 90 tablet, Rfl: 1    metaxalone (SKELAXIN) 800 MG tablet, Take 1 tablet by mouth 3 (Three) Times a Day As Needed., Disp: , Rfl:     methenamine (HIPREX) 1 g tablet, Take 1 tablet by mouth 2 (Two) Times a Day With Meals., Disp: 180 tablet, Rfl: 3    oxyCODONE ER (oxyCONTIN) 15 MG tablet extended-release 12 hour, Take 1 tablet by mouth Every 12 (Twelve) Hours., Disp: , Rfl:     RABEprazole (ACIPHEX) 20 MG EC tablet, Take 1 tablet by mouth Daily., Disp: 90 tablet, Rfl: 1    sertraline (ZOLOFT) 100 MG tablet, TAKE 1 AND 1/2 TABLETS BY MOUTH EVERY NIGHT, Disp: 135 tablet, Rfl: 1    vitamin D (ERGOCALCIFEROL) 1.25 MG (09916 UT) capsule capsule, TAKE 1 CAPSULE BY MOUTH 1 TIME EVERY WEEK, Disp: 12 capsule, Rfl: 1    Allergies   Allergen Reactions    Morphine Itching    Sulfa Antibiotics Nausea And Vomiting    Atorvastatin Myalgia        Family History   Problem Relation Age of Onset    Depression Mother     Bipolar disorder Mother     Lung cancer Mother         MALIGNANT    Diabetes Mother         UNSPECIFIED TYPE    Cancer Mother         BLADDER    Osteoporosis Mother     Arthritis Mother     Lung cancer Father         MALIGNANT    Diabetes Father         UNSPECIFIED TYPE/ MELLITUS    Heart attack Father     Kidney cancer Father     Cancer Father         BLADDER    Arthritis Father     Heart disease Father     OCD Brother     Depression Brother     Diabetes Brother         UNSPECIFIED TYPE/MELLITUS    Kidney cancer Brother     Arthritis Brother     Paranoid behavior Brother     OCD Brother     Arthritis Son        Social History     Socioeconomic History    Marital status:    Tobacco Use    Smoking status: Every Day     Current  "packs/day: 0.50     Average packs/day: 0.5 packs/day for 15.0 years (7.5 ttl pk-yrs)     Types: Cigarettes     Passive exposure: Never    Smokeless tobacco: Never    Tobacco comments:     STARTED AT AGE 15   Vaping Use    Vaping status: Never Used   Substance and Sexual Activity    Alcohol use: Not Currently    Drug use: Never    Sexual activity: Not Currently     Birth control/protection: Tubal ligation       Vital Signs:   Resp 18   Ht 165.1 cm (65\")   Wt 116 kg (255 lb 11.7 oz)   BMI 42.56 kg/m²      Physical Exam     Result Review :   The following data was reviewed by: SOFIA Dias on 02/13/2025:  Results for orders placed or performed in visit on 07/08/25   Bladder Scan    Collection Time: 07/08/25 10:29 AM   Result Value Ref Range    Urine Volume 0    POC Urinalysis Dipstick, Automated    Collection Time: 07/08/25 10:35 AM    Specimen: Urine   Result Value Ref Range    Color Yellow Yellow, Straw, Dark Yellow, Bernadine    Clarity, UA Clear Clear    Specific Gravity  1.025 1.005 - 1.030    pH, Urine 5.5 5.0 - 8.0    Leukocytes Negative Negative    Nitrite, UA Positive (A) Negative    Protein, POC 30 mg/dL (A) Negative mg/dL    Glucose, UA Negative Negative mg/dL    Ketones, UA Negative Negative    Urobilinogen, UA 0.2 E.U./dL Normal, 0.2 E.U./dL    Bilirubin Negative Negative    Blood, UA Small (A) Negative    Lot Number [id number]     Expiration Date 102,031         Bladder Scan interpretation 02/13/2025    Estimation of residual urine via BVI 3000 Verathon Bladder Scan  MA/nurse performing: landen haynes Rn   Residual Urine: 0 ml  Indication: Microhematuria    Genitourinary syndrome of menopause    Recurrent UTI   Position: Supine  Examination: Incremental scanning of the suprapubic area using 2.0 MHz transducer using copious amounts of acoustic gel.   Findings: An anechoic area was demonstrated which represented the bladder, with measurement of residual urine as noted. I inspected this myself. In that the " residual urine was stable or insignificant, refer to plan for treatment and plan necessary at this time.          Procedures        Assessment and Plan    Diagnoses and all orders for this visit:    1. Microhematuria (Primary)  -     POC Urinalysis Dipstick, Automated  -     Bladder Scan    2. Genitourinary syndrome of menopause  -     estradiol (ESTRACE) 0.1 MG/GM vaginal cream; Apply 0.5gm  inside the vagina and rub in and 0.5gm around the vestibule and massage and as well as around urethra.  3 times a week at night  Dispense: 42.5 g; Refill: 6    3. Recurrent UTI  -     methenamine (HIPREX) 1 g tablet; Take 1 tablet by mouth 2 (Two) Times a Day With Meals.  Dispense: 180 tablet; Refill: 3  -     [facility] Guidance UTI -; Future      Discussed that I believe she did not give the estrace a long enough period of time to ensure that this would help her recurrent UTIs/ gsm this will take likely up to 6 months to make a huge change in her tissues.     Hipprex discussed for uti mitigation.     I believe she may have some component of IC as well.     Discussed that her CBC abnormalities may need to be discussed further with hematology.     Will order PCR cx to identify for bacteria.     Discussed she may benefit from bladder instillations.    Believe patient to have asymptomatic bacteriuria rather than persistent urinary tract infection given history.    Believe patient to have asymptomatic bacteriuria rather than persistent urinary tract infection given history of no change in symptoms with treatment of antibiotics.    Believe patient's bladder to be colonized.  Discussed that asymptomatic bacteriuria is a commonly encounter diagnosis which does not require antibiotic treatment.  Discussed recommendation of the American u[employer]ic Association that treatment for asymptomatic bacteriuria should be avoided in the general population.     Discussed with patient the importance of obtaining urine culture prior to treatment  with antibiotics for urinary tract infection when symptoms of urinary tract infection arise.     Discussed that routine screening UA for infection is not recommended as in postmenopausal women asymptomatic bacteruria is common and does not warrant treatment     Discussed that obtaining urine culture after treatment for UTI is also not recommended unless a patient has persistent symptoms of UTI given prevalence of asymptomatic bacteriuria.       I spent 30 minutes caring for Lindsey on this date of service. This time includes time spent by me in the following activities:reviewing tests, obtaining and/or reviewing a separately obtained history, performing a medically appropriate examination and/or evaluation , counseling and educating the patient/family/caregiver, ordering medications, tests, or procedures, and documenting information in the medical record  Follow Up   Return in about 3 months (around 10/8/2025) for f/u  gsm/ oab/ recurrent UTi .  Patient was given instructions and counseling regarding her condition or for health maintenance advice. Please see specific information pulled into the AVS if appropriate.         This document has been electronically signed by SOFIA Dias  July 8, 2025 11:18 EDT

## 2025-07-17 ENCOUNTER — OFFICE VISIT (OUTPATIENT)
Dept: ORTHOPEDIC SURGERY | Facility: CLINIC | Age: 60
End: 2025-07-17
Payer: COMMERCIAL

## 2025-07-17 VITALS
BODY MASS INDEX: 37.65 KG/M2 | DIASTOLIC BLOOD PRESSURE: 124 MMHG | OXYGEN SATURATION: 96 % | HEART RATE: 115 BPM | SYSTOLIC BLOOD PRESSURE: 153 MMHG | HEIGHT: 65 IN | WEIGHT: 226 LBS

## 2025-07-17 DIAGNOSIS — M25.552 BILATERAL HIP PAIN: ICD-10-CM

## 2025-07-17 DIAGNOSIS — M70.72 BURSITIS OF BOTH HIPS, UNSPECIFIED BURSA: ICD-10-CM

## 2025-07-17 DIAGNOSIS — M25.551 BILATERAL HIP PAIN: ICD-10-CM

## 2025-07-17 DIAGNOSIS — M76.899 TENDINITIS OF HIP, UNSPECIFIED LATERALITY: Primary | ICD-10-CM

## 2025-07-17 DIAGNOSIS — M70.71 BURSITIS OF BOTH HIPS, UNSPECIFIED BURSA: ICD-10-CM

## 2025-07-17 RX ORDER — LIDOCAINE HYDROCHLORIDE 10 MG/ML
5 INJECTION, SOLUTION INFILTRATION; PERINEURAL
Status: COMPLETED | OUTPATIENT
Start: 2025-07-17 | End: 2025-07-17

## 2025-07-17 RX ORDER — TRIAMCINOLONE ACETONIDE 40 MG/ML
40 INJECTION, SUSPENSION INTRA-ARTICULAR; INTRAMUSCULAR
Status: COMPLETED | OUTPATIENT
Start: 2025-07-17 | End: 2025-07-17

## 2025-07-17 RX ADMIN — TRIAMCINOLONE ACETONIDE 40 MG: 40 INJECTION, SUSPENSION INTRA-ARTICULAR; INTRAMUSCULAR at 14:20

## 2025-07-17 RX ADMIN — LIDOCAINE HYDROCHLORIDE 5 ML: 10 INJECTION, SOLUTION INFILTRATION; PERINEURAL at 14:20

## 2025-07-17 NOTE — PROGRESS NOTES
"Chief Complaint  Follow-up of the Right Hip and Follow-up of the Left Hip    Subjective          History of Present Illness      Lindsey Mckinnon is a 60 y.o. female  presents to [facility] for     Patient presents with her  for follow-up evaluation of bilateral hip bursitis.  She saw Dr. Beckwith for her initial visits and gave her bursitis injections with relief her last injection was in April she says its worn off recently she also takes diclofenac and is requesting a refill.  She points to the lateral hip bilaterally as her areas of pain      Allergies   Allergen Reactions    Morphine Itching    Sulfa Antibiotics Nausea And Vomiting    Atorvastatin Myalgia        Social History     Socioeconomic History    Marital status:    Tobacco Use    Smoking status: Every Day     Current packs/day: 0.50     Average packs/day: 0.5 packs/day for 15.0 years (7.5 ttl pk-yrs)     Types: Cigarettes     Passive exposure: Never    Smokeless tobacco: Never    Tobacco comments:     STARTED AT AGE 15   Vaping Use    Vaping status: Never Used   Substance and Sexual Activity    Alcohol use: Not Currently    Drug use: Never    Sexual activity: Not Currently     Birth control/protection: Tubal ligation        REVIEW OF SYSTEMS    Constitutional: Awake alert and oriented x3, no acute distress, denies fevers, chills, weight loss  Respiratory: No respiratory distress  Vascular: Brisk cap refill, Intact distal pulses, No cyanosis, compartments soft with no signs or symptoms of compartment syndrome or DVT.   Cardiovascular: Denies chest pain, shortness of breath  Skin: Denies rashes, acute skin changes  Neurologic: Denies headache, loss of consciousness  MSK: Bilateral hip pain      Objective   Vital Signs:   BP (!) 153/124 Comment: Provider aware. Patient denies s&s. Patient advised to call PCP.  Pulse 115   Ht 165.1 cm (65\")   Wt 103 kg (226 lb)   SpO2 96%   BMI 37.61 kg/m²     Body " mass index is 37.61 kg/m².    Physical Exam       Bilateral lower extremity: Hip Flexion 80. Internal rotation 20. External rotation 20. No skin discoloration, atrophy or swelling. Positive EHL, FHL, GS, and TA. Sensation intact to all 5 nerves of the foot. Positive straight leg raise. Leg lengths appear equal. Ambulates with antalgic gait.       Large Joint: R greater trochanteric bursa  Date/Time: 7/17/2025 2:20 PM  Consent given by: patient  Site marked: site marked  Timeout: Immediately prior to procedure a time out was called to verify the correct patient, procedure, equipment, support staff and site/side marked as required   Supporting Documentation  Indications: pain   Procedure Details  Location: hip - R greater trochanteric bursa  Preparation: Patient was prepped and draped in the usual sterile fashion  Needle gauge: 21g.  Medications administered: 5 mL lidocaine 1 %; 40 mg triamcinolone acetonide 40 MG/ML  Patient tolerance: patient tolerated the procedure well with no immediate complications      Large Joint: L greater trochanteric bursa  Date/Time: 7/17/2025 2:20 PM  Consent given by: patient  Site marked: site marked  Timeout: Immediately prior to procedure a time out was called to verify the correct patient, procedure, equipment, support staff and site/side marked as required   Supporting Documentation  Indications: pain   Procedure Details  Location: hip - L greater trochanteric bursa  Preparation: Patient was prepped and draped in the usual sterile fashion  Needle gauge: 21g.  Medications administered: 5 mL lidocaine 1 %; 40 mg triamcinolone acetonide 40 MG/ML  Patient tolerance: patient tolerated the procedure well with no immediate complications    This injection documentation was Scribed for BULL Parada by Karen Mensah MA.  07/17/25   14:21 EDT    Imaging Results (Most Recent)       None                   Assessment and Plan    Diagnoses and all orders for this visit:    1.  Tendinitis of hip, unspecified laterality (Primary)  -     diclofenac (VOLTAREN) 50 MG EC tablet; Take 1 tablet by mouth 2 (Two) Times a Day for 90 days.  Dispense: 180 tablet; Refill: 0    2. Bilateral hip pain  -     diclofenac (VOLTAREN) 50 MG EC tablet; Take 1 tablet by mouth 2 (Two) Times a Day for 90 days.  Dispense: 180 tablet; Refill: 0    3. Bursitis of both hips, unspecified bursa        Discussed diagnosis and treatment options with the patient she elected to have bilateral hip bursitis injections.  A sterile prep of the injection site was performed with chloraprep. Cryospray was used for local anesthesia. The site was injected. The patient tolerated the procedure well without complications. Post injection pain was discussed.    The risks, benefits, complications, treatment options/alternatives, and expected outcomes/goals were discussed with the patient.   She was given refill of diclofenac.  Follow-up 3 months    Call or return if worsening symptoms.    Follow Up   Return in about 3 months (around 10/17/2025) for Recheck.  Patient was given instructions and counseling regarding her condition or for health maintenance advice. Please see specific information pulled into the AVS if appropriate.       EMR Dragon/Transcription disclaimer:  Part of this note may be an electronic transcription/translation of spoken language to printed text using the Dragon Dictation System

## 2025-07-29 ENCOUNTER — OFFICE VISIT (OUTPATIENT)
Dept: FAMILY MEDICINE CLINIC | Facility: CLINIC | Age: 60
End: 2025-07-29
Payer: COMMERCIAL

## 2025-07-29 ENCOUNTER — LAB (OUTPATIENT)
Dept: LAB | Facility: HOSPITAL | Age: 60
End: 2025-07-29
Payer: COMMERCIAL

## 2025-07-29 VITALS
HEART RATE: 82 BPM | BODY MASS INDEX: 36.65 KG/M2 | OXYGEN SATURATION: 98 % | SYSTOLIC BLOOD PRESSURE: 120 MMHG | HEIGHT: 65 IN | WEIGHT: 220 LBS | DIASTOLIC BLOOD PRESSURE: 75 MMHG

## 2025-07-29 DIAGNOSIS — K21.9 GASTROESOPHAGEAL REFLUX DISEASE WITHOUT ESOPHAGITIS: ICD-10-CM

## 2025-07-29 DIAGNOSIS — Z12.31 ENCOUNTER FOR SCREENING MAMMOGRAM FOR MALIGNANT NEOPLASM OF BREAST: ICD-10-CM

## 2025-07-29 DIAGNOSIS — J43.9 PULMONARY EMPHYSEMA, UNSPECIFIED EMPHYSEMA TYPE: ICD-10-CM

## 2025-07-29 DIAGNOSIS — E03.9 HYPOTHYROIDISM, UNSPECIFIED TYPE: ICD-10-CM

## 2025-07-29 DIAGNOSIS — I10 ESSENTIAL HYPERTENSION: ICD-10-CM

## 2025-07-29 DIAGNOSIS — J30.9 ALLERGIC RHINITIS, UNSPECIFIED SEASONALITY, UNSPECIFIED TRIGGER: ICD-10-CM

## 2025-07-29 DIAGNOSIS — I10 ESSENTIAL HYPERTENSION: Primary | ICD-10-CM

## 2025-07-29 DIAGNOSIS — R73.09 ELEVATED GLUCOSE: ICD-10-CM

## 2025-07-29 DIAGNOSIS — J44.9 COPD WITHOUT EXACERBATION: ICD-10-CM

## 2025-07-29 DIAGNOSIS — R79.89 ABNORMAL TSH: ICD-10-CM

## 2025-07-29 DIAGNOSIS — G47.00 INSOMNIA, UNSPECIFIED TYPE: ICD-10-CM

## 2025-07-29 DIAGNOSIS — Z87.891 HISTORY OF NICOTINE USE: ICD-10-CM

## 2025-07-29 DIAGNOSIS — E55.9 VITAMIN D DEFICIENCY: ICD-10-CM

## 2025-07-29 LAB
ALBUMIN SERPL-MCNC: 3.8 G/DL (ref 3.5–5.2)
ALBUMIN/GLOB SERPL: 1.3 G/DL
ALP SERPL-CCNC: 85 U/L (ref 39–117)
ALT SERPL W P-5'-P-CCNC: 15 U/L (ref 1–33)
ANION GAP SERPL CALCULATED.3IONS-SCNC: 10 MMOL/L (ref 5–15)
AST SERPL-CCNC: 10 U/L (ref 1–32)
BASOPHILS # BLD AUTO: 0.1 10*3/MM3 (ref 0–0.2)
BASOPHILS NFR BLD AUTO: 0.7 % (ref 0–1.5)
BILIRUB SERPL-MCNC: 0.2 MG/DL (ref 0–1.2)
BUN SERPL-MCNC: 11 MG/DL (ref 8–23)
BUN/CREAT SERPL: 12.2 (ref 7–25)
CALCIUM SPEC-SCNC: 9.4 MG/DL (ref 8.6–10.5)
CHLORIDE SERPL-SCNC: 105 MMOL/L (ref 98–107)
CHOLEST SERPL-MCNC: 214 MG/DL (ref 0–200)
CO2 SERPL-SCNC: 23 MMOL/L (ref 22–29)
CREAT SERPL-MCNC: 0.9 MG/DL (ref 0.57–1)
DEPRECATED RDW RBC AUTO: 46.8 FL (ref 37–54)
EGFRCR SERPLBLD CKD-EPI 2021: 73.3 ML/MIN/1.73
EOSINOPHIL # BLD AUTO: 0.11 10*3/MM3 (ref 0–0.4)
EOSINOPHIL NFR BLD AUTO: 0.8 % (ref 0.3–6.2)
ERYTHROCYTE [DISTWIDTH] IN BLOOD BY AUTOMATED COUNT: 13 % (ref 12.3–15.4)
GLOBULIN UR ELPH-MCNC: 3 GM/DL
GLUCOSE SERPL-MCNC: 106 MG/DL (ref 65–99)
HCT VFR BLD AUTO: 48.4 % (ref 34–46.6)
HDLC SERPL-MCNC: 44 MG/DL (ref 40–60)
HGB BLD-MCNC: 15.8 G/DL (ref 12–15.9)
IMM GRANULOCYTES # BLD AUTO: 0.09 10*3/MM3 (ref 0–0.05)
IMM GRANULOCYTES NFR BLD AUTO: 0.6 % (ref 0–0.5)
LDLC SERPL CALC-MCNC: 149 MG/DL (ref 0–100)
LDLC/HDLC SERPL: 3.33 {RATIO}
LYMPHOCYTES # BLD AUTO: 3.14 10*3/MM3 (ref 0.7–3.1)
LYMPHOCYTES NFR BLD AUTO: 22.2 % (ref 19.6–45.3)
MCH RBC QN AUTO: 32 PG (ref 26.6–33)
MCHC RBC AUTO-ENTMCNC: 32.6 G/DL (ref 31.5–35.7)
MCV RBC AUTO: 98 FL (ref 79–97)
MONOCYTES # BLD AUTO: 1.1 10*3/MM3 (ref 0.1–0.9)
MONOCYTES NFR BLD AUTO: 7.8 % (ref 5–12)
NEUTROPHILS NFR BLD AUTO: 67.9 % (ref 42.7–76)
NEUTROPHILS NFR BLD AUTO: 9.63 10*3/MM3 (ref 1.7–7)
NRBC BLD AUTO-RTO: 0 /100 WBC (ref 0–0.2)
PLATELET # BLD AUTO: 335 10*3/MM3 (ref 140–450)
PMV BLD AUTO: 10.9 FL (ref 6–12)
POTASSIUM SERPL-SCNC: 3.8 MMOL/L (ref 3.5–5.2)
PROT SERPL-MCNC: 6.8 G/DL (ref 6–8.5)
RBC # BLD AUTO: 4.94 10*6/MM3 (ref 3.77–5.28)
SODIUM SERPL-SCNC: 138 MMOL/L (ref 136–145)
T4 FREE SERPL-MCNC: 1.11 NG/DL (ref 0.92–1.68)
TRIGL SERPL-MCNC: 118 MG/DL (ref 0–150)
TSH SERPL DL<=0.05 MIU/L-ACNC: 2.81 UIU/ML (ref 0.27–4.2)
VLDLC SERPL-MCNC: 21 MG/DL (ref 5–40)
WBC NRBC COR # BLD AUTO: 14.17 10*3/MM3 (ref 3.4–10.8)

## 2025-07-29 PROCEDURE — 3078F DIAST BP <80 MM HG: CPT | Performed by: NURSE PRACTITIONER

## 2025-07-29 PROCEDURE — 83036 HEMOGLOBIN GLYCOSYLATED A1C: CPT

## 2025-07-29 PROCEDURE — 1159F MED LIST DOCD IN RCRD: CPT | Performed by: NURSE PRACTITIONER

## 2025-07-29 PROCEDURE — 99406 BEHAV CHNG SMOKING 3-10 MIN: CPT | Performed by: NURSE PRACTITIONER

## 2025-07-29 PROCEDURE — 84443 ASSAY THYROID STIM HORMONE: CPT

## 2025-07-29 PROCEDURE — 36415 COLL VENOUS BLD VENIPUNCTURE: CPT

## 2025-07-29 PROCEDURE — 3074F SYST BP LT 130 MM HG: CPT | Performed by: NURSE PRACTITIONER

## 2025-07-29 PROCEDURE — 80053 COMPREHEN METABOLIC PANEL: CPT

## 2025-07-29 PROCEDURE — 1125F AMNT PAIN NOTED PAIN PRSNT: CPT | Performed by: NURSE PRACTITIONER

## 2025-07-29 PROCEDURE — 85025 COMPLETE CBC W/AUTO DIFF WBC: CPT

## 2025-07-29 PROCEDURE — 1160F RVW MEDS BY RX/DR IN RCRD: CPT | Performed by: NURSE PRACTITIONER

## 2025-07-29 PROCEDURE — 80061 LIPID PANEL: CPT

## 2025-07-29 PROCEDURE — G0296 VISIT TO DETERM LDCT ELIG: HCPCS | Performed by: NURSE PRACTITIONER

## 2025-07-29 PROCEDURE — 84439 ASSAY OF FREE THYROXINE: CPT

## 2025-07-29 PROCEDURE — 99214 OFFICE O/P EST MOD 30 MIN: CPT | Performed by: NURSE PRACTITIONER

## 2025-07-29 RX ORDER — ERGOCALCIFEROL 1.25 MG/1
50000 CAPSULE, LIQUID FILLED ORAL WEEKLY
Qty: 12 CAPSULE | Refills: 1 | Status: SHIPPED | OUTPATIENT
Start: 2025-07-29

## 2025-07-29 RX ORDER — FLUTICASONE FUROATE, UMECLIDINIUM BROMIDE AND VILANTEROL TRIFENATATE 100; 62.5; 25 UG/1; UG/1; UG/1
1 POWDER RESPIRATORY (INHALATION) DAILY
Qty: 60 EACH | Refills: 1 | Status: SHIPPED | OUTPATIENT
Start: 2025-07-29

## 2025-07-29 RX ORDER — ALBUTEROL SULFATE 0.63 MG/3ML
1 SOLUTION RESPIRATORY (INHALATION) EVERY 6 HOURS PRN
Qty: 3 ML | Refills: 12 | Status: SHIPPED | OUTPATIENT
Start: 2025-07-29

## 2025-07-29 RX ORDER — LORATADINE 10 MG/1
10 TABLET ORAL DAILY
Qty: 90 TABLET | Refills: 1 | Status: SHIPPED | OUTPATIENT
Start: 2025-07-29

## 2025-07-29 RX ORDER — RABEPRAZOLE SODIUM 20 MG/1
20 TABLET, DELAYED RELEASE ORAL DAILY
Qty: 90 TABLET | Refills: 1 | Status: SHIPPED | OUTPATIENT
Start: 2025-07-29

## 2025-07-29 RX ORDER — LEVOTHYROXINE SODIUM 25 UG/1
25 TABLET ORAL
Qty: 90 TABLET | Refills: 0 | Status: SHIPPED | OUTPATIENT
Start: 2025-07-29

## 2025-07-29 RX ORDER — HYDROXYZINE HYDROCHLORIDE 50 MG/1
TABLET, FILM COATED ORAL
Qty: 60 TABLET | Refills: 2 | Status: SHIPPED | OUTPATIENT
Start: 2025-07-29

## 2025-07-29 RX ORDER — LOSARTAN POTASSIUM 100 MG/1
100 TABLET ORAL DAILY
Qty: 90 TABLET | Refills: 1 | Status: SHIPPED | OUTPATIENT
Start: 2025-07-29

## 2025-07-29 NOTE — PROGRESS NOTES
Chief Complaint  Insomnia (hypert), Hypertension, and Heartburn    SUBJECTIVE  Lindsey Mckinnon presents to [facility]    Pt c/o difficulty staying and falling asleep.  States she previously tried some trazodone some few years back but did not like it.    Patient continues to be followed by urology for chronic dysuria.  She has been prescribed estrogen vaginal cream and methenamine twice daily    Patient is requesting refills today and she is due for lab work.  History of Present Illness  Past Medical History:   Diagnosis Date    Acute ST elevation myocardial infarction (STEMI) involving right coronary artery 08/25/2022    Alcoholism     Anemia     Anxiety     Arthritis     Asthma     Bipolar disorder     Breast lump     Cervical spine pain     Chronic allergic rhinitis     Chronic pain disorder     COPD (chronic obstructive pulmonary disease) 04/15/2021    Coronary artery disease     Deep vein thrombosis     Degenerative disc disease, cervical     Depression     Esophageal reflux     Essential hypertension 04/15/2021    Fibromyalgia     Forgetfulness     Gall stones     GERD (gastroesophageal reflux disease)     H/O emphysema     Head injury     Heart attack 08/2022    Hemorrhoid     Hernia cerebri     Hernia, hiatal     Herniated disc, cervical     Hyperlipidemia 04/15/2021    Kidney stone     Limb swelling     Lumbar pain     Lumbosacral disc disease     Migraine     Mood disorder     Muscle cramps     Nicotine dependence 04/15/2021    Peripheral neuropathy     Reflux esophagitis     Shortness of breath     Spinal stenosis     STEMI (ST elevation myocardial infarction) 08/25/2022    Thoracic disc disorder     Tremor 04/15/2021      Family History   Problem Relation Age of Onset    Depression Mother     Bipolar disorder Mother     Lung cancer Mother         MALIGNANT    Diabetes Mother         UNSPECIFIED TYPE    Cancer Mother         BLADDER    Osteoporosis Mother      Arthritis Mother     Lung cancer Father         MALIGNANT    Diabetes Father         UNSPECIFIED TYPE/ MELLITUS    Heart attack Father     Kidney cancer Father     Cancer Father         BLADDER    Arthritis Father     Heart disease Father     OCD Brother     Depression Brother     Diabetes Brother         UNSPECIFIED TYPE/MELLITUS    Kidney cancer Brother     Arthritis Brother     Paranoid behavior Brother     OCD Brother     Arthritis Son       Past Surgical History:   Procedure Laterality Date    CARDIAC CATHETERIZATION N/A 2022    Procedure: Left Heart Cath;  Surgeon: Adam Helms MD;  Location: [facility] CATH INVASIVE LOCATION;  Service: Cardiovascular;  Laterality: N/A;    CAROTID STENT      CERVICAL EPIDURAL      STERIOD INJECTIONS      SECTION      X 2     SECTION  ,    CHOLECYSTECTOMY      ENDOSCOPY      EPIDURAL BLOCK      GALLBLADDER SURGERY      HYSTERECTOMY      HYSTERECTOMY      LUMBAR EPIDURAL INJECTION      STERIOD    TRIGGER POINT INJECTION      VASCULAR SURGERY          Current Outpatient Medications:     albuterol (ACCUNEB) 0.63 MG/3ML nebulizer solution, Take 3 mL by nebulization Every 6 (Six) Hours As Needed for Wheezing., Disp: 3 mL, Rfl: 12    diclofenac (VOLTAREN) 50 MG EC tablet, Take 1 tablet by mouth 2 (Two) Times a Day for 90 days., Disp: 180 tablet, Rfl: 0    estradiol (ESTRACE) 0.1 MG/GM vaginal cream, Apply 0.5gm  inside the vagina and rub in and 0.5gm around the vestibule and massage and as well as around urethra.  3 times a week at night, Disp: 42.5 g, Rfl: 6    Evolocumab (REPATHA) solution auto-injector SureClick injection, Inject 1 mL under the skin into the appropriate area as directed Every 14 (Fourteen) Days., Disp: 2 mL, Rfl: 11    Fluticasone-Umeclidin-Vilant (Trelegy Ellipta) 100-62.5-25 MCG/ACT inhaler, Inhale 1 puff Daily., Disp: 60 each, Rfl: 1    levothyroxine (SYNTHROID, LEVOTHROID) 25 MCG tablet, Take 1 tablet by mouth Every  "Morning., Disp: 90 tablet, Rfl: 0    loratadine (CLARITIN) 10 MG tablet, Take 1 tablet by mouth Daily., Disp: 90 tablet, Rfl: 1    losartan (Cozaar) 100 MG tablet, Take 1 tablet by mouth Daily., Disp: 90 tablet, Rfl: 1    metaxalone (SKELAXIN) 800 MG tablet, Take 1 tablet by mouth 3 (Three) Times a Day As Needed., Disp: , Rfl:     methenamine (HIPREX) 1 g tablet, Take 1 tablet by mouth 2 (Two) Times a Day With Meals., Disp: 180 tablet, Rfl: 3    oxyCODONE ER (oxyCONTIN) 15 MG tablet extended-release 12 hour, Take 1 tablet by mouth Every 12 (Twelve) Hours., Disp: , Rfl:     RABEprazole (ACIPHEX) 20 MG EC tablet, Take 1 tablet by mouth Daily., Disp: 90 tablet, Rfl: 1    sertraline (ZOLOFT) 100 MG tablet, TAKE 1 AND 1/2 TABLETS BY MOUTH EVERY NIGHT, Disp: 135 tablet, Rfl: 1    vitamin D (ERGOCALCIFEROL) 1.25 MG (37119 UT) capsule capsule, Take 1 capsule by mouth 1 (One) Time Per Week., Disp: 12 capsule, Rfl: 1    hydrOXYzine (ATARAX) 50 MG tablet, Take 1-2 tablets nightly as needed for sleep, Disp: 60 tablet, Rfl: 2    OBJECTIVE  Vital Signs:   /75   Pulse 82   Ht 165.1 cm (65\")   Wt 99.8 kg (220 lb)   SpO2 98%   BMI 36.61 kg/m²    Estimated body mass index is 36.61 kg/m² as calculated from the following:    Height as of this encounter: 165.1 cm (65\").    Weight as of this encounter: 99.8 kg (220 lb).     Wt Readings from Last 3 Encounters:   07/29/25 99.8 kg (220 lb)   07/17/25 103 kg (226 lb)   07/08/25 116 kg (255 lb 11.7 oz)     BP Readings from Last 3 Encounters:   07/29/25 120/75   07/17/25 (!) 153/124   07/01/25 136/82       Physical Exam     Result Review        DEXA Bone Density Axial  Result Date: 5/16/2025  Impression: Osteoporosis. Report dictated by: Lindsey Disselkamp  I have personally reviewed this case and agree with the findings above: Electronically Signed: Adam Rosario MD  5/16/2025 4:14 PM EDT  Workstation ID: [id number]        The above data has been reviewed by SOFIA Grullon " 07/29/2025 12:57 EDT.          Patient Care Team:  Mady Mcnally APRN as PCP - General (Family Medicine)  Arminda Arteaga APRN as Nurse Practitioner (Pain Medicine)  Adam Helms MD as Consulting Physician (Cardiology)  Franco Landa PA-C as Physician Assistant (Physician Assistant)  Carlo Drew Jr., MD as Consulting Physician (Pain Medicine)  Sergio Wilkins PA as Physician Assistant (Orthopedic Surgery)  Jose Beckwith MD as Consulting Physician (Orthopedic Surgery)  Adam Helms MD as Consulting Physician (Cardiology)  Cesilia Delaney APRN as Nurse Practitioner (Urology)            ASSESSMENT & PLAN    Diagnoses and all orders for this visit:    1. Essential hypertension (Primary)  Comments:  BP well controlled, continue current medication  Orders:  -     Comprehensive Metabolic Panel; Future  -     CBC & Differential; Future  -     Lipid Panel; Future  -     losartan (Cozaar) 100 MG tablet; Take 1 tablet by mouth Daily.  Dispense: 90 tablet; Refill: 1    2. Encounter for screening mammogram for malignant neoplasm of breast  -     Mammo Screening Digital Tomosynthesis Bilateral With CAD; Future    3. History of nicotine use  -      CT Chest Low Dose Cancer Screening WO; Future    4. Pulmonary emphysema, unspecified emphysema type  Comments:  Continue inhaler and albuterol nebulizer as needed  Orders:  -     albuterol (ACCUNEB) 0.63 MG/3ML nebulizer solution; Take 3 mL by nebulization Every 6 (Six) Hours As Needed for Wheezing.  Dispense: 3 mL; Refill: 12    5. COPD without exacerbation  Comments:  Continue Spiriva and add Trelegy ellipta, side effects and administration addressed.  Orders:  -     Fluticasone-Umeclidin-Vilant (Trelegy Ellipta) 100-62.5-25 MCG/ACT inhaler; Inhale 1 puff Daily.  Dispense: 60 each; Refill: 1    6. Abnormal TSH  -     levothyroxine (SYNTHROID, LEVOTHROID) 25 MCG tablet; Take 1 tablet by mouth Every Morning.  Dispense: 90 tablet; Refill:  0    7. Allergic rhinitis, unspecified seasonality, unspecified trigger  Comments:  Symptoms well controlled with current medication regimen, cont. Current meds.  Orders:  -     loratadine (CLARITIN) 10 MG tablet; Take 1 tablet by mouth Daily.  Dispense: 90 tablet; Refill: 1    8. Gastroesophageal reflux disease without esophagitis  Comments:  Symptoms well controlled with current medication regimen, cont. Current meds.  Orders:  -     RABEprazole (ACIPHEX) 20 MG EC tablet; Take 1 tablet by mouth Daily.  Dispense: 90 tablet; Refill: 1    9. Vitamin D deficiency  Comments:  continue weekly vitamin d supplement  Orders:  -     vitamin D (ERGOCALCIFEROL) 1.25 MG (26050 UT) capsule capsule; Take 1 capsule by mouth 1 (One) Time Per Week.  Dispense: 12 capsule; Refill: 1    10. Hypothyroidism, unspecified type  -     TSH+Free T4; Future    11. Insomnia, unspecified type  Comments:  Trial of Atarax nightly, side effects and administration addressed.  Orders:  -     hydrOXYzine (ATARAX) 50 MG tablet; Take 1-2 tablets nightly as needed for sleep  Dispense: 60 tablet; Refill: 2         Tobacco Use: High Risk (7/29/2025)    Patient History     Smoking Tobacco Use: Every Day     Smokeless Tobacco Use: Never     Passive Exposure: Never     Lindsey Mckinnon  reports that she has been smoking cigarettes. She has a 7.5 pack-year smoking history. She has never been exposed to tobacco smoke. She has never used smokeless tobacco. I have educated her on the risk of diseases from using tobacco products such as cancer, COPD, and heart disease.     I advised her to quit and she is not willing to quit.    I spent 10 minutes counseling the patient.     Discussed lung cancer screening recommendations with the patient using the Lung Cancer Screening shared decision-making tool, including benefits and risks of a low-dose lung CT scan. The patient has agreed to  screening at this time. Will revisit at future visits as  appropriate.        Follow Up     Return in about 6 months (around 1/29/2026).      Patient was given instructions and counseling regarding her condition or for health maintenance advice. Please see specific information pulled into the AVS if appropriate.   I have reviewed information obtained and documented by others and I have confirmed the accuracy of this documented note.    Mady Mcnally, APRN

## 2025-07-30 ENCOUNTER — RESULTS FOLLOW-UP (OUTPATIENT)
Dept: LAB | Facility: HOSPITAL | Age: 60
End: 2025-07-30
Payer: COMMERCIAL

## 2025-07-30 DIAGNOSIS — R73.09 ELEVATED GLUCOSE: Primary | ICD-10-CM

## 2025-07-30 DIAGNOSIS — R73.09 ELEVATED HEMOGLOBIN A1C: ICD-10-CM

## 2025-07-30 DIAGNOSIS — D72.829 LEUKOCYTOSIS, UNSPECIFIED TYPE: ICD-10-CM

## 2025-07-30 LAB — HBA1C MFR BLD: 6.3 % (ref 4.8–5.6)

## 2025-08-19 ENCOUNTER — LAB (OUTPATIENT)
Dept: LAB | Facility: HOSPITAL | Age: 60
End: 2025-08-19
Payer: COMMERCIAL

## 2025-08-19 ENCOUNTER — TELEPHONE (OUTPATIENT)
Dept: UROLOGY | Age: 60
End: 2025-08-19
Payer: COMMERCIAL

## 2025-08-19 ENCOUNTER — HOSPITAL ENCOUNTER (OUTPATIENT)
Dept: CT IMAGING | Facility: HOSPITAL | Age: 60
Discharge: HOME | End: 2025-08-19
Payer: COMMERCIAL

## 2025-08-19 DIAGNOSIS — R31.0 GROSS HEMATURIA: Primary | ICD-10-CM

## 2025-08-19 DIAGNOSIS — R31.0 GROSS HEMATURIA: ICD-10-CM

## 2025-08-19 LAB
BACTERIA UR QL AUTO: ABNORMAL /HPF
BILIRUB UR QL STRIP: NEGATIVE
CLARITY UR: CLEAR
COLOR UR: YELLOW
GLUCOSE UR STRIP-MCNC: NEGATIVE MG/DL
HGB UR QL STRIP.AUTO: ABNORMAL
HYALINE CASTS UR QL AUTO: ABNORMAL /LPF
KETONES UR QL STRIP: NEGATIVE
LEUKOCYTE ESTERASE UR QL STRIP.AUTO: NEGATIVE
NITRITE UR QL STRIP: NEGATIVE
PH UR STRIP.AUTO: 6 [PH] (ref 5–8)
PROT UR QL STRIP: NEGATIVE
RBC # UR STRIP: ABNORMAL /HPF
REF LAB TEST METHOD: ABNORMAL
SP GR UR STRIP: >1.03 (ref 1–1.03)
SQUAMOUS #/AREA URNS HPF: ABNORMAL /HPF
UROBILINOGEN UR QL STRIP: ABNORMAL
WBC # UR STRIP: ABNORMAL /HPF

## 2025-08-19 PROCEDURE — 87077 CULTURE AEROBIC IDENTIFY: CPT

## 2025-08-19 PROCEDURE — 74178 CT ABD&PLV WO CNTR FLWD CNTR: CPT

## 2025-08-19 PROCEDURE — 25510000001 IOPAMIDOL PER 1 ML: Performed by: NURSE PRACTITIONER

## 2025-08-19 PROCEDURE — 87086 URINE CULTURE/COLONY COUNT: CPT

## 2025-08-19 PROCEDURE — 87186 SC STD MICRODIL/AGAR DIL: CPT

## 2025-08-19 PROCEDURE — 81001 URINALYSIS AUTO W/SCOPE: CPT

## 2025-08-19 RX ORDER — IOPAMIDOL 755 MG/ML
100 INJECTION, SOLUTION INTRAVASCULAR
Status: COMPLETED | OUTPATIENT
Start: 2025-08-19 | End: 2025-08-19

## 2025-08-19 RX ADMIN — IOPAMIDOL 100 ML: 755 INJECTION, SOLUTION INTRAVENOUS at 15:44

## 2025-08-21 LAB — BACTERIA SPEC AEROBE CULT: ABNORMAL

## (undated) DEVICE — SI AVANTI+ 6F STD W/GW  NO OBT: Brand: AVANTI

## (undated) DEVICE — PTCA DILATATION CATHETER: Brand: NC QUANTUM APEX™

## (undated) DEVICE — 6F .070 JR 4 100CM: Brand: CORDIS

## (undated) DEVICE — NC TREK CORONARY DILATATION CATHETER 3.0 MM X 15 MM / RAPID-EXCHANGE: Brand: NC TREK

## (undated) DEVICE — KT CATH INDIGO RX ASP 140CM

## (undated) DEVICE — NC TREK CORONARY DILATATION CATHETER 5.0 MM X 12 MM / RAPID-EXCHANGE: Brand: NC TREK

## (undated) DEVICE — CATH F6 ST JL 4 100CM: Brand: SUPERTORQUE

## (undated) DEVICE — HI-TORQUE BALANCE MIDDLEWEIGHT UNIVERSAL GUIDE WIRE .014 STRAIGHT TIP 3.0 CM X 190 CM: Brand: HI-TORQUE BALANCE MIDDLEWEIGHT UNIVERSAL

## (undated) DEVICE — Device

## (undated) DEVICE — KT MANIFOLD CATHLAB CUST

## (undated) DEVICE — HI-TORQUE WHISPER ES GUIDE WIRE .014 STRAIGHTTIP 3.0 CM X 190 CM: Brand: HI-TORQUE WHISPER

## (undated) DEVICE — CATH F6 ST PIG 155 110CM 6SH: Brand: SUPER TORQUE